# Patient Record
Sex: MALE | Race: WHITE | NOT HISPANIC OR LATINO | Employment: OTHER | URBAN - METROPOLITAN AREA
[De-identification: names, ages, dates, MRNs, and addresses within clinical notes are randomized per-mention and may not be internally consistent; named-entity substitution may affect disease eponyms.]

---

## 2017-01-12 ENCOUNTER — TELEPHONE (OUTPATIENT)
Dept: FAMILY MEDICINE | Facility: CLINIC | Age: 77
End: 2017-01-12

## 2017-01-12 NOTE — TELEPHONE ENCOUNTER
----- Message from RT Tyrone sent at 1/12/2017 11:04 AM CST -----  Contact: Karrie (wife) [phone]   Karrie (wife) [phone] , requesting lab test orders in and scheduled for the pt prior the his appt of 04/20/2017, please call to confirm, thanks.

## 2017-01-12 NOTE — TELEPHONE ENCOUNTER
Pt is having 6 month labs done January 13th and has 6 month appointment jan 20th   Labs include TSH, CMP,HgbA1c, Lipid.    You can schedule his annual labs at time of his visit jan 20th       Tried to call pt no answer.

## 2017-01-13 ENCOUNTER — LAB VISIT (OUTPATIENT)
Dept: LAB | Facility: HOSPITAL | Age: 77
End: 2017-01-13
Attending: FAMILY MEDICINE
Payer: MEDICARE

## 2017-01-13 DIAGNOSIS — E11.9 TYPE 2 DIABETES MELLITUS WITHOUT COMPLICATION: ICD-10-CM

## 2017-01-13 DIAGNOSIS — E78.5 HYPERLIPIDEMIA: ICD-10-CM

## 2017-01-13 LAB
ALBUMIN SERPL BCP-MCNC: 4 G/DL
ALP SERPL-CCNC: 58 U/L
ALT SERPL W/O P-5'-P-CCNC: 24 U/L
ANION GAP SERPL CALC-SCNC: 9 MMOL/L
AST SERPL-CCNC: 22 U/L
BILIRUB SERPL-MCNC: 0.6 MG/DL
BUN SERPL-MCNC: 21 MG/DL
CALCIUM SERPL-MCNC: 9.8 MG/DL
CHLORIDE SERPL-SCNC: 104 MMOL/L
CHOLEST/HDLC SERPL: 3.8 {RATIO}
CO2 SERPL-SCNC: 26 MMOL/L
CREAT SERPL-MCNC: 1.2 MG/DL
EST. GFR  (AFRICAN AMERICAN): >60 ML/MIN/1.73 M^2
EST. GFR  (NON AFRICAN AMERICAN): 58 ML/MIN/1.73 M^2
GLUCOSE SERPL-MCNC: 169 MG/DL
HDL/CHOLESTEROL RATIO: 26 %
HDLC SERPL-MCNC: 127 MG/DL
HDLC SERPL-MCNC: 33 MG/DL
LDLC SERPL CALC-MCNC: 72.2 MG/DL
NONHDLC SERPL-MCNC: 94 MG/DL
POTASSIUM SERPL-SCNC: 4.3 MMOL/L
PROT SERPL-MCNC: 7.2 G/DL
SODIUM SERPL-SCNC: 139 MMOL/L
TRIGL SERPL-MCNC: 109 MG/DL
TSH SERPL DL<=0.005 MIU/L-ACNC: 2.34 UIU/ML

## 2017-01-13 PROCEDURE — 36415 COLL VENOUS BLD VENIPUNCTURE: CPT | Mod: PO

## 2017-01-13 PROCEDURE — 80061 LIPID PANEL: CPT

## 2017-01-13 PROCEDURE — 84443 ASSAY THYROID STIM HORMONE: CPT

## 2017-01-13 PROCEDURE — 83036 HEMOGLOBIN GLYCOSYLATED A1C: CPT

## 2017-01-13 PROCEDURE — 80053 COMPREHEN METABOLIC PANEL: CPT

## 2017-01-14 LAB
ESTIMATED AVG GLUCOSE: 166 MG/DL
HBA1C MFR BLD HPLC: 7.4 %

## 2017-01-20 ENCOUNTER — OFFICE VISIT (OUTPATIENT)
Dept: FAMILY MEDICINE | Facility: CLINIC | Age: 77
End: 2017-01-20
Payer: MEDICARE

## 2017-01-20 VITALS
BODY MASS INDEX: 25.07 KG/M2 | SYSTOLIC BLOOD PRESSURE: 110 MMHG | RESPIRATION RATE: 15 BRPM | WEIGHT: 165.38 LBS | HEART RATE: 77 BPM | DIASTOLIC BLOOD PRESSURE: 72 MMHG | HEIGHT: 68 IN

## 2017-01-20 DIAGNOSIS — E78.5 HYPERLIPIDEMIA, UNSPECIFIED HYPERLIPIDEMIA TYPE: ICD-10-CM

## 2017-01-20 DIAGNOSIS — R35.0 URINARY FREQUENCY: ICD-10-CM

## 2017-01-20 DIAGNOSIS — E11.9 TYPE 2 DIABETES MELLITUS WITHOUT COMPLICATION, WITHOUT LONG-TERM CURRENT USE OF INSULIN: Primary | ICD-10-CM

## 2017-01-20 LAB
BACTERIA #/AREA URNS HPF: NORMAL /HPF
BILIRUB UR QL STRIP: NEGATIVE
CLARITY UR: CLEAR
COLOR UR: YELLOW
CREAT UR-MCNC: 215 MG/DL
GLUCOSE UR QL STRIP: NEGATIVE
HGB UR QL STRIP: ABNORMAL
KETONES UR QL STRIP: NEGATIVE
LEUKOCYTE ESTERASE UR QL STRIP: NEGATIVE
MICROALBUMIN UR DL<=1MG/L-MCNC: 28 UG/ML
MICROALBUMIN/CREATININE RATIO: 13 UG/MG
MICROSCOPIC COMMENT: NORMAL
NITRITE UR QL STRIP: NEGATIVE
PH UR STRIP: 6 [PH] (ref 5–8)
PROT UR QL STRIP: NEGATIVE
RBC #/AREA URNS HPF: 1 /HPF (ref 0–4)
SP GR UR STRIP: >=1.03 (ref 1–1.03)
SQUAMOUS #/AREA URNS HPF: 1 /HPF
URN SPEC COLLECT METH UR: ABNORMAL
WBC #/AREA URNS HPF: 1 /HPF (ref 0–5)

## 2017-01-20 PROCEDURE — 99214 OFFICE O/P EST MOD 30 MIN: CPT | Mod: S$GLB,,, | Performed by: FAMILY MEDICINE

## 2017-01-20 PROCEDURE — 1159F MED LIST DOCD IN RCRD: CPT | Mod: S$GLB,,, | Performed by: FAMILY MEDICINE

## 2017-01-20 PROCEDURE — 99999 PR PBB SHADOW E&M-EST. PATIENT-LVL III: CPT | Mod: PBBFAC,,, | Performed by: FAMILY MEDICINE

## 2017-01-20 PROCEDURE — 1160F RVW MEDS BY RX/DR IN RCRD: CPT | Mod: S$GLB,,, | Performed by: FAMILY MEDICINE

## 2017-01-20 PROCEDURE — 87086 URINE CULTURE/COLONY COUNT: CPT

## 2017-01-20 PROCEDURE — 82570 ASSAY OF URINE CREATININE: CPT

## 2017-01-20 PROCEDURE — 1157F ADVNC CARE PLAN IN RCRD: CPT | Mod: S$GLB,,, | Performed by: FAMILY MEDICINE

## 2017-01-20 PROCEDURE — 1126F AMNT PAIN NOTED NONE PRSNT: CPT | Mod: S$GLB,,, | Performed by: FAMILY MEDICINE

## 2017-01-20 PROCEDURE — 81000 URINALYSIS NONAUTO W/SCOPE: CPT | Mod: PO

## 2017-01-20 NOTE — PROGRESS NOTES
Subjective:       Patient ID: Cecilio Cespedes is a 76 y.o. male.    Chief Complaint: Diabetes (6 m onth follow up with labs ); Urinary Frequency; low appetite; and Cold Feet (mostly at night )    HPI     Type 2 diabetes, chronic condition.  Mild worsening of control with increase in his A1c to 7.4.    Urinary frequency and nocturia 2-3per night.  No dysuria  .  No fever or chills.    Hyperlipidemia chronic with persistently low HDL    Feet have been cold.  No cyanosis.  No claudication.    Active Ambulatory Problems     Diagnosis Date Noted    Hyperlipidemia 03/24/2014    Tobacco dependence 07/15/2016    Type 2 diabetes mellitus without complication 07/15/2016     Resolved Ambulatory Problems     Diagnosis Date Noted    No Resolved Ambulatory Problems     Past Medical History   Diagnosis Date    Diabetes 2001    Type 2 diabetes mellitus        Review of Systems   Constitutional: Negative for chills and fever.   Respiratory: Negative for shortness of breath and wheezing.    Cardiovascular: Negative for chest pain, palpitations and leg swelling.   Gastrointestinal: Negative for abdominal distention and abdominal pain.   Endocrine: Negative for polydipsia, polyphagia and polyuria.   Genitourinary: Positive for frequency. Negative for decreased urine volume, dysuria, flank pain, hematuria and urgency.       Objective:      Physical Exam   Constitutional: He is oriented to person, place, and time. He appears well-developed and well-nourished. No distress.   HENT:   Head: Normocephalic and atraumatic.   Eyes: No scleral icterus.   Neck: Normal range of motion. Neck supple.   Cardiovascular: Normal rate, regular rhythm and normal heart sounds.  Exam reveals no gallop.    No murmur heard.  Pulses:       Dorsalis pedis pulses are 1+ on the right side, and 1+ on the left side.        Posterior tibial pulses are 1+ on the right side, and 1+ on the left side.   Pulmonary/Chest: Effort normal. No respiratory distress.    Musculoskeletal:        Right foot: There is normal range of motion and no deformity.        Left foot: There is no deformity.   Feet:   Right Foot:   Protective Sensation: 10 sites tested. 10 sites sensed.   Skin Integrity: Negative for ulcer or skin breakdown.   Left Foot:   Protective Sensation: 10 sites tested. 10 sites sensed.   Skin Integrity: Negative for ulcer or skin breakdown.   Neurological: He is alert and oriented to person, place, and time. He has normal reflexes.   Skin: Skin is warm and dry. He is not diaphoretic.   Psychiatric: He has a normal mood and affect. His behavior is normal.   Vitals reviewed.    Foot exam reveals normal skin palpable pulses bilaterally slightly below normal but no cyanosis.  Assessment:       1. Type 2 diabetes mellitus without complication, without long-term current use of insulin    2. Hyperlipidemia, unspecified hyperlipidemia type    3. Urinary frequency        Plan:       Cecilio COUGHLIN was seen today for diabetes, urinary frequency, low appetite and cold feet.    Diagnoses and all orders for this visit:    Type 2 diabetes mellitus without complication, without long-term current use of insulin  -     Hemoglobin A1c; Future  -     Comprehensive metabolic panel; Future  -     Microalbumin/creatinine urine ratio; Future  Aerobic exercise 3-4 times a week along with continuing an active lifestyle  Reduce carbohydrate intakeby skin and 15%per meal    Hyperlipidemia, unspecified hyperlipidemia type  -     Lipid panel; Future  Regular aerobic exercise should help HDL    Urinary frequency  -     Urinalysis; Future  -     Urine culture; Future  -     Urinalysis Microscopic; Future  If urinalysis is normal and frequency persist he will contact us to consider urology consultation.

## 2017-01-20 NOTE — MR AVS SNAPSHOT
[facility]  [address]  [facility] [address]  Phone: [phone]  Fax: [phone]                  Cecilio Cespedes   2017 8:20 AM   Office Visit    Description:  Male : 1940   Provider:  Mason West MD   Department:  [facility]           Reason for Visit     Diabetes     Urinary Frequency     low appetite     Cold Feet           Diagnoses this Visit        Comments    Type 2 diabetes mellitus without complication, without long-term current use of insulin    -  Primary     Hyperlipidemia, unspecified hyperlipidemia type         Urinary frequency                To Do List           Future Appointments        Provider Department Dept Phone    2017 8:05 AM LAB, [place] [facility] [phone]    2017 8:40 AM Mason West MD [facility] [phone]      Goals (5 Years of Data)     None      Follow-Up and Disposition     Follow-up and Disposition History      [facility]     [facility] Care [facility] - 24/ Assistance  Registered nurses in the [facility] provide clinical advisement, health education, appointment booking, and other advisory services.  Call for this free service at [phone].             Medications           Message regarding Medications     Verify the changes and/or additions to your medication regime listed below are the same as discussed with your clinician today.  If any of these changes or additions are incorrect, please notify your healthcare provider.             Verify that the below list of medications is an accurate representation of the medications you are currently taking.  If none reported, the list may be blank. If incorrect, please contact your healthcare provider. Carry this list with you in case of emergency.           Current Medications     aspirin (ECOTRIN) 81 MG EC tablet Take 1 tablet by mouth Daily.    blood sugar diagnostic  "(ACCU-CHEK CHIKIS PLUS TEST STRP) Strp Dx Code: E11.9 Test Once Daily    blood sugar diagnostic (ACCU-CHEK CHIKIS) Strp Strips and lancets to test glucose twice daily.    blood sugar diagnostic Strp Use test strips and lancets everyday.     metformin (GLUCOPHAGE) 500 MG tablet Take 1 po in am and 2 po in evening    simvastatin (ZOCOR) 40 MG tablet Take 1 tablet (40 mg total) by mouth every evening.    adhesive bandage 1 X 3 " [facility]            Clinical Reference Information           Vital Signs - Last Recorded  Most recent update: 1/20/2017  8:18 AM by Bambi Bryant LPN    BP Pulse Resp Ht Wt BMI    110/72 77 15 5' 8" (1.727 m) 75 kg (165 lb 5.5 oz) 25.14 kg/m2      Blood Pressure          Most Recent Value    BP  110/72      Allergies as of 1/20/2017     No Known Allergies      Immunizations Administered on Date of Encounter - 1/20/2017     None      Orders Placed During Today's Visit      Normal Orders This Visit    Microalbumin/creatinine urine ratio     Urinalysis Microscopic     Urinalysis     Urine culture     Future Labs/Procedures Expected by Expires    Comprehensive metabolic panel  1/20/2017 1/21/2018    Hemoglobin A1c  1/20/2017 1/21/2018    Lipid panel  1/20/2017 1/21/2018    Microalbumin/creatinine urine ratio  1/20/2017 1/21/2018    Urinalysis  1/20/2017 4/21/2017    Urine culture  1/20/2017 (Approximate) 7/19/2017    Urinalysis Microscopic  As directed 1/20/2018      "

## 2017-01-22 LAB — BACTERIA UR CULT: NO GROWTH

## 2017-02-20 RX ORDER — METFORMIN HYDROCHLORIDE 500 MG/1
TABLET ORAL
Qty: 360 TABLET | Refills: 3 | Status: SHIPPED | OUTPATIENT
Start: 2017-02-20 | End: 2018-01-30 | Stop reason: SDUPTHER

## 2017-02-20 RX ORDER — SIMVASTATIN 40 MG/1
40 TABLET, FILM COATED ORAL NIGHTLY
Qty: 90 TABLET | Refills: 3 | Status: SHIPPED | OUTPATIENT
Start: 2017-02-20 | End: 2018-01-30

## 2017-02-20 NOTE — TELEPHONE ENCOUNTER
----- Message from Jose Hannah sent at 2/20/2017 10:12 AM CST -----  Patient needs a refill on Metformin 500 mg, Simvastatin 40 mg called into [facility] pharmacy.  Please call patient at [phone] if you have any questions. Thanks!

## 2017-03-07 ENCOUNTER — CLINICAL SUPPORT (OUTPATIENT)
Dept: SMOKING CESSATION | Facility: CLINIC | Age: 77
End: 2017-03-07
Payer: COMMERCIAL

## 2017-03-07 DIAGNOSIS — F17.200 NICOTINE DEPENDENCE: Primary | ICD-10-CM

## 2017-03-07 PROCEDURE — 99407 BEHAV CHNG SMOKING > 10 MIN: CPT | Mod: S$GLB,,,

## 2017-04-13 ENCOUNTER — LAB VISIT (OUTPATIENT)
Dept: LAB | Facility: HOSPITAL | Age: 77
End: 2017-04-13
Attending: FAMILY MEDICINE
Payer: MEDICARE

## 2017-04-13 DIAGNOSIS — E11.9 TYPE 2 DIABETES MELLITUS WITHOUT COMPLICATION, WITHOUT LONG-TERM CURRENT USE OF INSULIN: ICD-10-CM

## 2017-04-13 DIAGNOSIS — E78.5 HYPERLIPIDEMIA, UNSPECIFIED HYPERLIPIDEMIA TYPE: ICD-10-CM

## 2017-04-13 LAB
ALBUMIN SERPL BCP-MCNC: 3.7 G/DL
ALP SERPL-CCNC: 57 U/L
ALT SERPL W/O P-5'-P-CCNC: 18 U/L
ANION GAP SERPL CALC-SCNC: 8 MMOL/L
AST SERPL-CCNC: 19 U/L
BILIRUB SERPL-MCNC: 0.6 MG/DL
BUN SERPL-MCNC: 18 MG/DL
CALCIUM SERPL-MCNC: 9.3 MG/DL
CHLORIDE SERPL-SCNC: 106 MMOL/L
CHOLEST/HDLC SERPL: 3 {RATIO}
CO2 SERPL-SCNC: 26 MMOL/L
CREAT SERPL-MCNC: 1.2 MG/DL
EST. GFR  (AFRICAN AMERICAN): >60 ML/MIN/1.73 M^2
EST. GFR  (NON AFRICAN AMERICAN): 57.6 ML/MIN/1.73 M^2
GLUCOSE SERPL-MCNC: 179 MG/DL
HDL/CHOLESTEROL RATIO: 33.3 %
HDLC SERPL-MCNC: 120 MG/DL
HDLC SERPL-MCNC: 40 MG/DL
LDLC SERPL CALC-MCNC: 64.2 MG/DL
NONHDLC SERPL-MCNC: 80 MG/DL
POTASSIUM SERPL-SCNC: 4.6 MMOL/L
PROT SERPL-MCNC: 6.7 G/DL
SODIUM SERPL-SCNC: 140 MMOL/L
TRIGL SERPL-MCNC: 79 MG/DL

## 2017-04-13 PROCEDURE — 80053 COMPREHEN METABOLIC PANEL: CPT

## 2017-04-13 PROCEDURE — 80061 LIPID PANEL: CPT

## 2017-04-13 PROCEDURE — 36415 COLL VENOUS BLD VENIPUNCTURE: CPT | Mod: PO

## 2017-04-13 PROCEDURE — 83036 HEMOGLOBIN GLYCOSYLATED A1C: CPT

## 2017-04-15 LAB
ESTIMATED AVG GLUCOSE: 169 MG/DL
HBA1C MFR BLD HPLC: 7.5 %

## 2017-04-20 ENCOUNTER — OFFICE VISIT (OUTPATIENT)
Dept: FAMILY MEDICINE | Facility: CLINIC | Age: 77
End: 2017-04-20
Payer: MEDICARE

## 2017-04-20 VITALS
RESPIRATION RATE: 16 BRPM | DIASTOLIC BLOOD PRESSURE: 62 MMHG | WEIGHT: 166.69 LBS | HEIGHT: 68 IN | BODY MASS INDEX: 25.26 KG/M2 | SYSTOLIC BLOOD PRESSURE: 114 MMHG | HEART RATE: 75 BPM

## 2017-04-20 DIAGNOSIS — E11.9 TYPE 2 DIABETES MELLITUS WITHOUT COMPLICATION, WITHOUT LONG-TERM CURRENT USE OF INSULIN: Primary | ICD-10-CM

## 2017-04-20 DIAGNOSIS — E78.5 HYPERLIPIDEMIA, UNSPECIFIED HYPERLIPIDEMIA TYPE: ICD-10-CM

## 2017-04-20 PROCEDURE — 99214 OFFICE O/P EST MOD 30 MIN: CPT | Mod: S$GLB,,, | Performed by: FAMILY MEDICINE

## 2017-04-20 PROCEDURE — 99999 PR PBB SHADOW E&M-EST. PATIENT-LVL III: CPT | Mod: PBBFAC,,, | Performed by: FAMILY MEDICINE

## 2017-04-20 RX ORDER — GLIMEPIRIDE 1 MG/1
1 TABLET ORAL
Qty: 90 TABLET | Refills: 3 | Status: SHIPPED | OUTPATIENT
Start: 2017-04-20 | End: 2017-04-25 | Stop reason: SDUPTHER

## 2017-04-20 NOTE — PROGRESS NOTES
"Subjective:     THIS DOCUMENT WAS MADE IN PART WITH [employer] DICTATION SOFTWARE.  OCCASIONALLY THIS SOFTWARE WILL MISINTERPRET WORDS OR PHRASES.     Patient ID: Cecilio Cespedes is a 77 y.o. male.    Chief Complaint: Diabetes (follow with labs )    HPI     T2DM, chronic condition with worsening control, A1c now 7.5.  He is up-to-date with his eye and foot exams.  He does take the metformin as prescribed.  Has not been following his diet as closely as he should.    Hyperlipidemia this has improved,    Active Ambulatory Problems     Diagnosis Date Noted    Hyperlipidemia 03/24/2014    Type 2 diabetes mellitus without complication 07/15/2016     Resolved Ambulatory Problems     Diagnosis Date Noted    Tobacco dependence 07/15/2016     Past Medical History:   Diagnosis Date    Diabetes 2001    Hyperlipidemia     Type 2 diabetes mellitus        Review of Systems   Constitutional: Negative for chills and fever.   Eyes: Negative for visual disturbance.   Respiratory: Negative for shortness of breath and wheezing.    Cardiovascular: Negative for chest pain, palpitations and leg swelling.   Gastrointestinal: Negative for abdominal distention and abdominal pain.   Endocrine: Negative for polydipsia, polyphagia and polyuria.   Genitourinary: Negative for decreased urine volume, dysuria, flank pain, hematuria and urgency.       Objective:       Vitals:    04/20/17 0847   BP: 114/62   Pulse: 75   Resp: 16   Weight: 75.6 kg (166 lb 10.7 oz)   Height: 5' 8" (1.727 m)       Physical Exam   Constitutional: He is oriented to person, place, and time. He appears well-developed and well-nourished. No distress.   HENT:   Head: Normocephalic and atraumatic.   Right Ear: External ear normal.   Left Ear: External ear normal.   Mouth/Throat: No oropharyngeal exudate.   Eyes: EOM are normal. Pupils are equal, round, and reactive to light. Right eye exhibits no discharge. Left eye exhibits no discharge. No scleral icterus.   Neck: Normal " range of motion. Neck supple. No thyromegaly present.   Cardiovascular: Normal rate and regular rhythm.  Exam reveals gallop.    No murmur heard.  Pulmonary/Chest: Effort normal and breath sounds normal. No respiratory distress. He has no wheezes.   Neurological: He is alert and oriented to person, place, and time.   Skin: Skin is warm and dry. He is not diaphoretic.   Psychiatric: He has a normal mood and affect. His behavior is normal.   Vitals reviewed.      Results for orders placed or performed in visit on 04/13/17   Hemoglobin A1c   Result Value Ref Range    Hemoglobin A1C 7.5 (H) 4.5 - 6.2 %    Estimated Avg Glucose 169 (H) 68 - 131 mg/dL   Lipid panel   Result Value Ref Range    Cholesterol 120 120 - 199 mg/dL    Triglycerides 79 30 - 150 mg/dL    HDL 40 40 - 75 mg/dL    LDL Cholesterol 64.2 63.0 - 159.0 mg/dL    HDL/Chol Ratio 33.3 20.0 - 50.0 %    Total Cholesterol/HDL Ratio 3.0 2.0 - 5.0    Non-HDL Cholesterol 80 mg/dL   Comprehensive metabolic panel   Result Value Ref Range    Sodium 140 136 - 145 mmol/L    Potassium 4.6 3.5 - 5.1 mmol/L    Chloride 106 95 - 110 mmol/L    CO2 26 23 - 29 mmol/L    Glucose 179 (H) 70 - 110 mg/dL    BUN, Bld 18 8 - 23 mg/dL    Creatinine 1.2 0.5 - 1.4 mg/dL    Calcium 9.3 8.7 - 10.5 mg/dL    Total Protein 6.7 6.0 - 8.4 g/dL    Albumin 3.7 3.5 - 5.2 g/dL    Total Bilirubin 0.6 0.1 - 1.0 mg/dL    Alkaline Phosphatase 57 55 - 135 U/L    AST 19 10 - 40 U/L    ALT 18 10 - 44 U/L    Anion Gap 8 8 - 16 mmol/L    eGFR if African American >60.0 >60 mL/min/1.73 m^2    eGFR if non  57.6 (A) >60 mL/min/1.73 m^2       Assessment:       1. Type 2 diabetes mellitus without complication, without long-term current use of insulin    2. Hyperlipidemia, unspecified hyperlipidemia type        Plan:       Cecilio COUGHLIN was seen today for diabetes.    Diagnoses and all orders for this visit:    Type 2 diabetes mellitus without complication, without long-term current use of insulin  -      Hemoglobin A1c; Future  -     Basic metabolic panel; Future  Worsening of control, of course she must work on diet and exercise we did discuss this.  But begin glimepiride 1 mg daily.  This is a low-dose but he was still counseled on hypoglycemia risk.  He should not skip meals and will let me know if he has any problems.  Labs and follow back in 3 months    Hyperlipidemia, unspecified hyperlipidemia type improved continue current medication     Other orders  -     glimepiride (AMARYL) 1 MG tablet; Take 1 tablet (1 mg total) by mouth before breakfast.

## 2017-04-20 NOTE — MR AVS SNAPSHOT
[facility]  [address]  [facility] [address]  Phone: [phone]  Fax: [phone]                  Cecilio Cespedes   2017 8:40 AM   Office Visit    Description:  Male : 1940   Provider:  Mason West MD   Department:  [facility]           Reason for Visit     Diabetes           Diagnoses this Visit        Comments    Type 2 diabetes mellitus without complication, without long-term current use of insulin    -  Primary     Hyperlipidemia, unspecified hyperlipidemia type                To Do List           Future Appointments        Provider Department Dept Phone    2017 8:00 AM LAB, [place] [facility] [phone]    2017 8:40 AM Mason West MD [facility] [phone]      Goals (5 Years of Data)     None       These Medications        Disp Refills Start End    glimepiride (AMARYL) 1 MG tablet 90 tablet 3 2017    Take 1 tablet (1 mg total) by mouth before breakfast. - Oral    Pharmacy: [facility] - [address] Ph #: [phone]         [facility]     [facility] Care [facility] -  Assistance  Unless otherwise directed by your provider, please contact [facility], our nurse care line that is available for  assistance.     Registered nurses in the [facility] provide: appointment scheduling, clinical advisement, health education, and other advisory services.  Call: [phone] (toll free)               Medications           Message regarding Medications     Verify the changes and/or additions to your medication regime listed below are the same as discussed with your clinician today.  If any of these changes or additions are incorrect, please notify your healthcare provider.        START taking these NEW medications        Refills    glimepiride (AMARYL) 1 MG tablet 3    Sig: Take 1 tablet  "(1 mg total) by mouth before breakfast.    Class: Normal    Route: Oral           Verify that the below list of medications is an accurate representation of the medications you are currently taking.  If none reported, the list may be blank. If incorrect, please contact your healthcare provider. Carry this list with you in case of emergency.           Current Medications     adhesive bandage 1 X 3 " [facility]     aspirin (ECOTRIN) 81 MG EC tablet Take 1 tablet by mouth Daily.    blood sugar diagnostic (ACCU-CHEK CHIKIS PLUS TEST STRP) Strp Dx Code: E11.9 Test Once Daily    blood sugar diagnostic (ACCU-CHEK CHIKIS) Strp Strips and lancets to test glucose twice daily.    blood sugar diagnostic Strp Use test strips and lancets everyday.     metformin (GLUCOPHAGE) 500 MG tablet Take 2 po in am and 2 po in evening    simvastatin (ZOCOR) 40 MG tablet Take 1 tablet (40 mg total) by mouth every evening.    glimepiride (AMARYL) 1 MG tablet Take 1 tablet (1 mg total) by mouth before breakfast.           Clinical Reference Information           Your Vitals Were     BP Pulse Resp Height Weight BMI    114/62 75 16 5' 8" (1.727 m) 75.6 kg (166 lb 10.7 oz) 25.34 kg/m2      Blood Pressure          Most Recent Value    BP  114/62      Allergies as of 4/20/2017     No Known Allergies      Immunizations Administered on Date of Encounter - 4/20/2017     None      Orders Placed During Today's Visit     Future Labs/Procedures Expected by Expires    Basic metabolic panel  7/26/2017 4/21/2018    Hemoglobin A1c  7/26/2017 4/21/2018      Language Assistance Services     ATTENTION: Language assistance services are available, free of charge. Please call [phone].      ATENCIÓN: Si habla español, tiene a hanna disposición servicios gratuitos de asistencia lingüística. Llame al [phone].     LEWIS Ý: N?u b?n nói Ti?ng Vi?t, có các d?ch v? h? tr? ngôn ng? mi?n phí dành cho b?n. G?i s? [phone].         [facility] complies " with applicable Federal civil rights laws and does not discriminate on the basis of race, color, national origin, age, disability, or sex.

## 2017-04-25 RX ORDER — GLIMEPIRIDE 1 MG/1
1 TABLET ORAL
Qty: 90 TABLET | Refills: 3 | Status: SHIPPED | OUTPATIENT
Start: 2017-04-25 | End: 2018-01-30 | Stop reason: SDUPTHER

## 2017-04-25 NOTE — TELEPHONE ENCOUNTER
----- Message from Odessa Blake sent at 4/25/2017 10:35 AM CDT -----  Contact: wife,Karrie Yoon wants to speak with nurse regarding the patient's medication being sent to the wrong pharmacy.  States their no longer using [employer] mail order service. The medication needs to be sent to     [facility] - [place], [place] - [address] [place] [address]  Phone: [phone] Fax: [phone]

## 2017-04-25 NOTE — TELEPHONE ENCOUNTER
----- Message from Prashanth Franks sent at 4/25/2017 10:45 AM CDT -----  Contact: Karrie Wife  Mail ordered prescription was sent to [facility], but patient now has [employer]. The prescription. Call back [phone] (home)     [facility] - [place], [place] - [address] [place] [address]  Phone: [phone] Fax: [phone]    Nicomanoj needs to be removed

## 2017-05-02 DIAGNOSIS — E11.9 TYPE 2 DIABETES MELLITUS WITHOUT COMPLICATION, WITHOUT LONG-TERM CURRENT USE OF INSULIN: Primary | ICD-10-CM

## 2017-05-02 NOTE — TELEPHONE ENCOUNTER
Is he really testing 3 times a day?.  If he is then when he comes in he needs to bring that list to verify and document that he is actually checking it 3 times a day    You see if I write and a prescription for 3 times a day and he is only checking it once or twice yet getting the insurance company to pay for extra then that is technically problematic.    I can only authorizing as much as he is actually using and he has to bring in his actual list to verify every time I see him.

## 2017-05-02 NOTE — TELEPHONE ENCOUNTER
----- Message from Karly Crews sent at 5/2/2017 12:07 PM CDT -----  Requesting refill for Sandhya plus accue check test strips / testing 3 x d ... call [phone]    [id number] - [place] - [address]  [address]  [place] [address]  Phone: [phone] Fax: [phone]

## 2017-05-02 NOTE — TELEPHONE ENCOUNTER
Spoke with wife he is not testing three times a day.   He test once a day per wife. Explained that insurance will only cover three times a day if he is on insulin and sliding scale she understands.

## 2017-07-20 ENCOUNTER — LAB VISIT (OUTPATIENT)
Dept: LAB | Facility: HOSPITAL | Age: 77
End: 2017-07-20
Attending: FAMILY MEDICINE
Payer: MEDICARE

## 2017-07-20 DIAGNOSIS — E11.9 TYPE 2 DIABETES MELLITUS WITHOUT COMPLICATION, WITHOUT LONG-TERM CURRENT USE OF INSULIN: ICD-10-CM

## 2017-07-20 LAB
ANION GAP SERPL CALC-SCNC: 6 MMOL/L
BUN SERPL-MCNC: 21 MG/DL
CALCIUM SERPL-MCNC: 9.3 MG/DL
CHLORIDE SERPL-SCNC: 107 MMOL/L
CO2 SERPL-SCNC: 27 MMOL/L
CREAT SERPL-MCNC: 1.2 MG/DL
EST. GFR  (AFRICAN AMERICAN): >60 ML/MIN/1.73 M^2
EST. GFR  (NON AFRICAN AMERICAN): 57.6 ML/MIN/1.73 M^2
ESTIMATED AVG GLUCOSE: 131 MG/DL
GLUCOSE SERPL-MCNC: 119 MG/DL
HBA1C MFR BLD HPLC: 6.2 %
POTASSIUM SERPL-SCNC: 4.5 MMOL/L
SODIUM SERPL-SCNC: 140 MMOL/L

## 2017-07-20 PROCEDURE — 80048 BASIC METABOLIC PNL TOTAL CA: CPT

## 2017-07-20 PROCEDURE — 36415 COLL VENOUS BLD VENIPUNCTURE: CPT | Mod: PO

## 2017-07-20 PROCEDURE — 83036 HEMOGLOBIN GLYCOSYLATED A1C: CPT

## 2017-07-27 ENCOUNTER — OFFICE VISIT (OUTPATIENT)
Dept: FAMILY MEDICINE | Facility: CLINIC | Age: 77
End: 2017-07-27
Payer: MEDICARE

## 2017-07-27 VITALS
BODY MASS INDEX: 24.89 KG/M2 | HEIGHT: 68 IN | HEART RATE: 74 BPM | WEIGHT: 164.25 LBS | DIASTOLIC BLOOD PRESSURE: 62 MMHG | RESPIRATION RATE: 16 BRPM | SYSTOLIC BLOOD PRESSURE: 116 MMHG

## 2017-07-27 DIAGNOSIS — E78.5 HYPERLIPIDEMIA, UNSPECIFIED HYPERLIPIDEMIA TYPE: ICD-10-CM

## 2017-07-27 DIAGNOSIS — R25.2 LEG CRAMPS: ICD-10-CM

## 2017-07-27 DIAGNOSIS — E11.9 TYPE 2 DIABETES MELLITUS WITHOUT COMPLICATION, WITHOUT LONG-TERM CURRENT USE OF INSULIN: Primary | ICD-10-CM

## 2017-07-27 PROCEDURE — 99499 UNLISTED E&M SERVICE: CPT | Mod: S$GLB,,, | Performed by: FAMILY MEDICINE

## 2017-07-27 PROCEDURE — 99999 PR PBB SHADOW E&M-EST. PATIENT-LVL III: CPT | Mod: PBBFAC,,, | Performed by: FAMILY MEDICINE

## 2017-07-27 PROCEDURE — 99214 OFFICE O/P EST MOD 30 MIN: CPT | Mod: S$GLB,,, | Performed by: FAMILY MEDICINE

## 2017-07-27 PROCEDURE — 1159F MED LIST DOCD IN RCRD: CPT | Mod: S$GLB,,, | Performed by: FAMILY MEDICINE

## 2017-07-27 PROCEDURE — 1126F AMNT PAIN NOTED NONE PRSNT: CPT | Mod: S$GLB,,, | Performed by: FAMILY MEDICINE

## 2017-07-27 RX ORDER — LANCETS 30 GAUGE
EACH MISCELLANEOUS
Refills: 0 | COMMUNITY
Start: 2017-05-03 | End: 2019-10-29

## 2017-07-27 NOTE — PROGRESS NOTES
"Subjective:     THIS DOCUMENT WAS MADE IN PART WITH [employer] DICTATION SOFTWARE.  OCCASIONALLY THIS SOFTWARE WILL MISINTERPRET WORDS OR PHRASES.     Patient ID: Cecilio Cespedes is a 77 y.o. male.    Chief Complaint: Diabetes (follow up with labs ; HM pneumonia vacccine )    HPI     Follow-up type 2 diabetes.  Chronic condition, improved A1c 6.2, down from 7.5 and 3 months ago.    Hyperlipidemia, this was checked last time but it was better as well HDL up to 40 LDL was 64 triglyceride 79    Leg cramps, new last 6 months, no claudication, no cyanosis  About twice a week.    Active Ambulatory Problems     Diagnosis Date Noted    Hyperlipidemia 03/24/2014    Type 2 diabetes mellitus without complication 07/15/2016     Resolved Ambulatory Problems     Diagnosis Date Noted    Tobacco dependence 07/15/2016     Past Medical History:   Diagnosis Date    Diabetes 2001    Hyperlipidemia     Type 2 diabetes mellitus        Review of Systems   Constitutional: Positive for fatigue.   Cardiovascular: Negative for chest pain.   Endocrine: Positive for polyuria. Negative for polydipsia and polyphagia.   Skin: Negative for pallor.   Neurological: Negative for dizziness, tremors, seizures, speech difficulty, weakness and headaches.   Psychiatric/Behavioral: Negative for confusion. The patient is not nervous/anxious.        Objective:       Vitals:    07/27/17 0851   BP: 116/62   BP Location: Left arm   Patient Position: Sitting   BP Method: Manual   Pulse: 74   Resp: 16   Weight: 74.5 kg (164 lb 3.9 oz)   Height: 5' 8" (1.727 m)       Physical Exam   Constitutional: He is oriented to person, place, and time. He appears well-developed and well-nourished. No distress.   HENT:   Head: Normocephalic and atraumatic.   Right Ear: External ear normal.   Left Ear: External ear normal.   Mouth/Throat: No oropharyngeal exudate.   Eyes: EOM are normal. Pupils are equal, round, and reactive to light. Right eye exhibits no discharge. Left " eye exhibits no discharge. No scleral icterus.   Neck: Normal range of motion. Neck supple. No thyromegaly present.   Cardiovascular: Normal rate and regular rhythm.  Exam reveals no gallop.    No murmur heard.  Pulmonary/Chest: Effort normal and breath sounds normal. No respiratory distress. He has no wheezes. He has no rales. He exhibits no tenderness.   Neurological: He is alert and oriented to person, place, and time.   Skin: Skin is warm and dry. He is not diaphoretic.   Psychiatric: He has a normal mood and affect. His behavior is normal.   Vitals reviewed.      Results for orders placed or performed in visit on 07/20/17   Hemoglobin A1c   Result Value Ref Range    Hemoglobin A1C 6.2 (H) 4.0 - 5.6 %    Estimated Avg Glucose 131 68 - 131 mg/dL   Basic metabolic panel   Result Value Ref Range    Sodium 140 136 - 145 mmol/L    Potassium 4.5 3.5 - 5.1 mmol/L    Chloride 107 95 - 110 mmol/L    CO2 27 23 - 29 mmol/L    Glucose 119 (H) 70 - 110 mg/dL    BUN, Bld 21 8 - 23 mg/dL    Creatinine 1.2 0.5 - 1.4 mg/dL    Calcium 9.3 8.7 - 10.5 mg/dL    Anion Gap 6 (L) 8 - 16 mmol/L    eGFR if African American >60.0 >60 mL/min/1.73 m^2    eGFR if non  57.6 (A) >60 mL/min/1.73 m^2       Assessment:       1. Type 2 diabetes mellitus without complication, without long-term current use of insulin    2. Hyperlipidemia, unspecified hyperlipidemia type    3. Leg cramps        Plan:       Cecilio COUGHLIN was seen today for diabetes.    Diagnoses and all orders for this visit:    Type 2 diabetes mellitus without complication, without long-term current use of insulin  -     Microalbumin/creatinine urine ratio; Future  -     Comprehensive metabolic panel; Future  -     Hemoglobin A1c; Future  -     Lipid panel; Future  Improved, continue current diabetic medications  Hyperlipidemia, unspecified hyperlipidemia type  This has been stable  Leg cramps  New complaint.  He has mildly diminished pulses on his exam but no cyanosis and  no claudication.  I recommendation is a regular stretching exercise program about an hour before bed.  Drink plenty of water.  May consider compression before bed if needed.  If not improving then discuss other options    Hold simvastatin x 2 weeks, see if fatigue is better

## 2017-07-28 ENCOUNTER — TELEPHONE (OUTPATIENT)
Dept: FAMILY MEDICINE | Facility: CLINIC | Age: 77
End: 2017-07-28

## 2017-07-28 NOTE — TELEPHONE ENCOUNTER
----- Message from Karly Crews sent at 7/28/2017 10:23 AM CDT -----  Call Miss Karrie Napierichaux ...[phone] saw  Yesterday ... Feels there is an error in paperwork given after appointment

## 2017-07-28 NOTE — TELEPHONE ENCOUNTER
Informed pt's wife that pt is to hold simvastatin x2wks and calls us to update on fatigue(better/same/worse). Understanding verbalized.

## 2017-09-06 ENCOUNTER — TELEPHONE (OUTPATIENT)
Dept: SMOKING CESSATION | Facility: CLINIC | Age: 77
End: 2017-09-06

## 2017-09-11 ENCOUNTER — CLINICAL SUPPORT (OUTPATIENT)
Dept: SMOKING CESSATION | Facility: CLINIC | Age: 77
End: 2017-09-11
Payer: COMMERCIAL

## 2017-09-11 DIAGNOSIS — F17.200 NICOTINE DEPENDENCE: Primary | ICD-10-CM

## 2017-09-11 PROCEDURE — 99407 BEHAV CHNG SMOKING > 10 MIN: CPT | Mod: S$GLB,,, | Performed by: INTERNAL MEDICINE

## 2017-10-19 ENCOUNTER — IMMUNIZATION (OUTPATIENT)
Dept: FAMILY MEDICINE | Facility: CLINIC | Age: 77
End: 2017-10-19
Payer: MEDICARE

## 2017-10-19 PROCEDURE — 90662 IIV NO PRSV INCREASED AG IM: CPT | Mod: S$GLB,,, | Performed by: INTERNAL MEDICINE

## 2017-10-19 PROCEDURE — G0008 ADMIN INFLUENZA VIRUS VAC: HCPCS | Mod: S$GLB,,, | Performed by: INTERNAL MEDICINE

## 2017-12-13 RX ORDER — BLOOD SUGAR DIAGNOSTIC
STRIP MISCELLANEOUS
Qty: 200 STRIP | Refills: 3 | Status: SHIPPED | OUTPATIENT
Start: 2017-12-13 | End: 2019-10-29

## 2017-12-13 NOTE — PROGRESS NOTES
Refill Authorization Note     is requesting a refill authorization.    Brief assessment and rationale for refill: APPROVE: prr  Amount/Quantity of medication ordered: 90d        Refills Authorized: Yes  If authorized number of refills: 3           Medication Therapy Plan: a1c controlled; approve 12 mo  Name and strength of medication: ONE TOUCH ULTRA TEST STRIPS  How patient will take medication: test BID  Medication reconciliation completed: No  Comments:   Lab Results   Component Value Date    HGBA1C 6.2 (H) 07/20/2017

## 2018-01-05 DIAGNOSIS — Z13.5 DIABETIC RETINOPATHY SCREENING: ICD-10-CM

## 2018-01-16 ENCOUNTER — TELEPHONE (OUTPATIENT)
Dept: ADMINISTRATIVE | Facility: HOSPITAL | Age: 78
End: 2018-01-16

## 2018-01-16 ENCOUNTER — PATIENT OUTREACH (OUTPATIENT)
Dept: ADMINISTRATIVE | Facility: HOSPITAL | Age: 78
End: 2018-01-16

## 2018-01-16 NOTE — LETTER
January 16, 2018    Dr. Traci Solo             [facility]  [address]  [facility] [address]  Phone: [phone] January 16, 2018     Patient: Cecilio Cespedes    YOB: 1940   Date of Visit: 1/16/2018       To Whom It May Concern:    We are seeing Cecilio Cespedes in the clinic at [facility].  Mason West MD is their PCP.  He has an outstanding lab/procedure at the time we reviewed their chart.  To help with our Health Maintenance records will you please supply the following report(s):     []  Mammogram                                                []  Colonoscopy   []  Pap Smear                                                  []  Outside Lab Results   []  Dexa scans                                                      [x]  Eye Exam (done in the past year, any Dx of retinopathy?)   []  Foot Exam                                                     [] Other___________   []  Outside Immunizations                             Please Fax to [facility] at .    Thank you for your help.  If my Care Coordinator can be of any assistance, you can call SHOAIB Weiner at [phone].     If you have any questions or concerns, please don't hesitate to call.    Sincerely,        Mason West MD

## 2018-01-16 NOTE — PROGRESS NOTES
Pre-visit Health Maintenance Review    Patient due for diabetic eye exam.  Called to schedule retinal camera appointment in [place] if not already done outside of [facility].    Upcoming Primary Care visit: Brett 1/30/18 8:40

## 2018-01-23 ENCOUNTER — LAB VISIT (OUTPATIENT)
Dept: LAB | Facility: HOSPITAL | Age: 78
End: 2018-01-23
Attending: FAMILY MEDICINE
Payer: MEDICARE

## 2018-01-23 DIAGNOSIS — E11.9 TYPE 2 DIABETES MELLITUS WITHOUT COMPLICATION, WITHOUT LONG-TERM CURRENT USE OF INSULIN: ICD-10-CM

## 2018-01-23 LAB
ALBUMIN SERPL BCP-MCNC: 3.7 G/DL
ALP SERPL-CCNC: 60 U/L
ALT SERPL W/O P-5'-P-CCNC: 18 U/L
ANION GAP SERPL CALC-SCNC: 6 MMOL/L
AST SERPL-CCNC: 16 U/L
BILIRUB SERPL-MCNC: 0.5 MG/DL
BUN SERPL-MCNC: 19 MG/DL
CALCIUM SERPL-MCNC: 9.2 MG/DL
CHLORIDE SERPL-SCNC: 107 MMOL/L
CHOLEST SERPL-MCNC: 177 MG/DL
CHOLEST/HDLC SERPL: 4.4 {RATIO}
CO2 SERPL-SCNC: 28 MMOL/L
CREAT SERPL-MCNC: 1.2 MG/DL
EST. GFR  (AFRICAN AMERICAN): >60 ML/MIN/1.73 M^2
EST. GFR  (NON AFRICAN AMERICAN): 57.6 ML/MIN/1.73 M^2
ESTIMATED AVG GLUCOSE: 117 MG/DL
GLUCOSE SERPL-MCNC: 133 MG/DL
HBA1C MFR BLD HPLC: 5.7 %
HDLC SERPL-MCNC: 40 MG/DL
HDLC SERPL: 22.6 %
LDLC SERPL CALC-MCNC: 121.4 MG/DL
NONHDLC SERPL-MCNC: 137 MG/DL
POTASSIUM SERPL-SCNC: 4.2 MMOL/L
PROT SERPL-MCNC: 6.9 G/DL
SODIUM SERPL-SCNC: 141 MMOL/L
TRIGL SERPL-MCNC: 78 MG/DL

## 2018-01-23 PROCEDURE — 80053 COMPREHEN METABOLIC PANEL: CPT

## 2018-01-23 PROCEDURE — 83036 HEMOGLOBIN GLYCOSYLATED A1C: CPT

## 2018-01-23 PROCEDURE — 36415 COLL VENOUS BLD VENIPUNCTURE: CPT | Mod: PO

## 2018-01-23 PROCEDURE — 80061 LIPID PANEL: CPT

## 2018-01-30 ENCOUNTER — OFFICE VISIT (OUTPATIENT)
Dept: FAMILY MEDICINE | Facility: CLINIC | Age: 78
End: 2018-01-30
Payer: MEDICARE

## 2018-01-30 VITALS
HEIGHT: 68 IN | HEART RATE: 74 BPM | SYSTOLIC BLOOD PRESSURE: 118 MMHG | TEMPERATURE: 98 F | BODY MASS INDEX: 26.51 KG/M2 | WEIGHT: 174.94 LBS | OXYGEN SATURATION: 98 % | DIASTOLIC BLOOD PRESSURE: 70 MMHG

## 2018-01-30 DIAGNOSIS — E78.5 DYSLIPIDEMIA: ICD-10-CM

## 2018-01-30 DIAGNOSIS — E11.9 TYPE 2 DIABETES MELLITUS WITHOUT COMPLICATION, WITHOUT LONG-TERM CURRENT USE OF INSULIN: Primary | ICD-10-CM

## 2018-01-30 DIAGNOSIS — E11.42 DIABETIC POLYNEUROPATHY ASSOCIATED WITH TYPE 2 DIABETES MELLITUS: ICD-10-CM

## 2018-01-30 PROCEDURE — 99999 PR PBB SHADOW E&M-EST. PATIENT-LVL III: CPT | Mod: PBBFAC,,, | Performed by: FAMILY MEDICINE

## 2018-01-30 PROCEDURE — 99214 OFFICE O/P EST MOD 30 MIN: CPT | Mod: S$GLB,,, | Performed by: FAMILY MEDICINE

## 2018-01-30 RX ORDER — PRAVASTATIN SODIUM 20 MG/1
20 TABLET ORAL DAILY
Qty: 90 TABLET | Refills: 3 | Status: SHIPPED | OUTPATIENT
Start: 2018-01-30 | End: 2019-03-07 | Stop reason: SDUPTHER

## 2018-01-30 RX ORDER — METFORMIN HYDROCHLORIDE 500 MG/1
TABLET ORAL
Qty: 360 TABLET | Refills: 3 | Status: SHIPPED | OUTPATIENT
Start: 2018-01-30 | End: 2019-03-19

## 2018-01-30 RX ORDER — GLIMEPIRIDE 1 MG/1
1 TABLET ORAL
Qty: 90 TABLET | Refills: 3 | Status: SHIPPED | OUTPATIENT
Start: 2018-01-30 | End: 2019-04-13 | Stop reason: SDUPTHER

## 2018-01-30 NOTE — PROGRESS NOTES
Subjective:     THIS DOCUMENT WAS MADE IN PART WITH [employer] DICTATION SOFTWARE.  OCCASIONALLY THIS SOFTWARE WILL MISINTERPRET WORDS OR PHRASES.     Patient ID: Cecilio Cespedes is a 77 y.o. male.    Chief Complaint: Follow-up (6 month follow up)    Diabetes   He presents for his follow-up diabetic visit. He has type 2 diabetes mellitus. His disease course has been improving. There are no hypoglycemic associated symptoms. Pertinent negatives for hypoglycemia include no confusion, dizziness, headaches, nervousness/anxiousness, pallor, seizures, speech difficulty or tremors. Associated symptoms include fatigue. Pertinent negatives for diabetes include no chest pain, no polydipsia, no polyphagia, no polyuria and no weakness. There are no hypoglycemic complications. Symptoms are improving. There are no diabetic complications. Risk factors for coronary artery disease include dyslipidemia. Current diabetic treatment includes oral agent (dual therapy). He is compliant with treatment all of the time. Weight trend: increasing, states coat was very heavy. He is following a generally healthy diet. Meal planning includes avoidance of concentrated sweets. He participates in exercise intermittently. His home blood glucose trend is decreasing steadily. His breakfast blood glucose range is generally 130-140 mg/dl. An ACE inhibitor/angiotensin II receptor blocker is not being taken (no microalbuminuria, normal BP). He does not see a podiatrist.Eye exam is current.     Dyslipidemia  Worse, states has been off simvastatin, side effects but does not remember what they were or if resoved    Active Ambulatory Problems     Diagnosis Date Noted    Hyperlipidemia 03/24/2014    Type 2 diabetes mellitus without complication 07/15/2016     Resolved Ambulatory Problems     Diagnosis Date Noted    Tobacco dependence 07/15/2016     Past Medical History:   Diagnosis Date    Diabetes 2001    Hyperlipidemia     Type 2 diabetes mellitus   "    Current Outpatient Prescriptions on File Prior to Visit   Medication Sig Dispense Refill    glimepiride (AMARYL) 1 MG tablet Take 1 tablet (1 mg total) by mouth before breakfast. 90 tablet 3    metformin (GLUCOPHAGE) 500 MG tablet Take 2 po in am and 2 po in evening 360 tablet 3    ONETOUCH DELICA LANCETS 33 gauge Misc USE TO TEST 1-2 TIMES A DAY  1    ONETOUCH ULTRA TEST Strp USE TO TEXT 1-2 TIMES A  strip 3    ONETOUCH ULTRA2 kit USE TO TEST DAILY  0    adhesive bandage 1 X 3 " Bndg       simvastatin (ZOCOR) 40 MG tablet Take 1 tablet (40 mg total) by mouth every evening. 90 tablet 3     No current facility-administered medications on file prior to visit.        Review of Systems   Constitutional: Positive for fatigue.   Eyes: Negative for visual disturbance.   Respiratory: Negative for shortness of breath.    Cardiovascular: Negative for chest pain and palpitations.   Gastrointestinal: Negative.    Endocrine: Negative for polydipsia, polyphagia and polyuria.   Musculoskeletal: Positive for arthralgias.   Skin: Negative for pallor.   Neurological: Negative for dizziness, tremors, seizures, speech difficulty, weakness and headaches.   Psychiatric/Behavioral: Negative for confusion. The patient is not nervous/anxious.        Objective:      Physical Exam   Constitutional: He is oriented to person, place, and time. He appears well-developed and well-nourished. No distress.   HENT:   Head: Normocephalic and atraumatic.   Right Ear: External ear normal.   Left Ear: External ear normal.   Mouth/Throat: No oropharyngeal exudate.   Eyes: EOM are normal. Pupils are equal, round, and reactive to light. Right eye exhibits no discharge. Left eye exhibits no discharge. No scleral icterus.   Neck: Normal range of motion. Neck supple. No thyromegaly present.   Cardiovascular: Normal rate and regular rhythm.  Exam reveals no gallop and no friction rub.    No murmur heard.  Pulses:       Dorsalis pedis pulses are " 2+ on the right side, and 2+ on the left side.        Posterior tibial pulses are 1+ on the right side, and 1+ on the left side.   Pulmonary/Chest: Effort normal and breath sounds normal. No respiratory distress. He has no wheezes. He has no rales. He exhibits no tenderness.   Musculoskeletal:        Right foot: There is no deformity.        Left foot: There is no deformity.   Feet:   Right Foot:   Protective Sensation: 10 sites tested. 7 sites sensed.   Skin Integrity: Positive for callus and dry skin. Negative for ulcer or skin breakdown.   Left Foot:   Protective Sensation: 10 sites tested. 8 sites sensed.   Skin Integrity: Positive for callus and dry skin. Negative for ulcer or skin breakdown.   Neurological: He is alert and oriented to person, place, and time.   Skin: Skin is dry. He is not diaphoretic.   Psychiatric: He has a normal mood and affect. His behavior is normal.   Vitals reviewed.      Assessment:       1. Type 2 diabetes mellitus without complication, without long-term current use of insulin    2. Dyslipidemia    3. Diabetic polyneuropathy associated with type 2 diabetes mellitus        Plan:       Cecilio COUGHLIN was seen today for follow-up.    Diagnoses and all orders for this visit:    Type 2 diabetes mellitus without complication, without long-term current use of insulin  -     glimepiride (AMARYL) 1 MG tablet; Take 1 tablet (1 mg total) by mouth before breakfast.  -     metFORMIN (GLUCOPHAGE) 500 MG tablet; Take 2 po in am and 2 po in evening    Dyslipidemia  Uncontrolled, start pravastatin    Diabetic polyneuropathy associated with type 2 diabetes mellitus  Evidence of mild neuropathy/ loss light touch  Likely diabetes, not painful   Discussed with patient, regular inspection, etc

## 2018-06-22 ENCOUNTER — LAB VISIT (OUTPATIENT)
Dept: LAB | Facility: HOSPITAL | Age: 78
End: 2018-06-22
Attending: FAMILY MEDICINE
Payer: MEDICARE

## 2018-06-22 DIAGNOSIS — E11.9 TYPE 2 DIABETES MELLITUS WITHOUT COMPLICATION, WITHOUT LONG-TERM CURRENT USE OF INSULIN: ICD-10-CM

## 2018-06-22 DIAGNOSIS — E78.5 DYSLIPIDEMIA: ICD-10-CM

## 2018-06-22 LAB
ALBUMIN SERPL BCP-MCNC: 4.1 G/DL
ALP SERPL-CCNC: 54 U/L
ALT SERPL W/O P-5'-P-CCNC: 19 U/L
ANION GAP SERPL CALC-SCNC: 12 MMOL/L
AST SERPL-CCNC: 17 U/L
BILIRUB SERPL-MCNC: 0.7 MG/DL
BUN SERPL-MCNC: 24 MG/DL
CALCIUM SERPL-MCNC: 9.7 MG/DL
CHLORIDE SERPL-SCNC: 107 MMOL/L
CHOLEST SERPL-MCNC: 148 MG/DL
CHOLEST/HDLC SERPL: 4.1 {RATIO}
CO2 SERPL-SCNC: 22 MMOL/L
CREAT SERPL-MCNC: 1.4 MG/DL
EST. GFR  (AFRICAN AMERICAN): 54.9 ML/MIN/1.73 M^2
EST. GFR  (NON AFRICAN AMERICAN): 47.4 ML/MIN/1.73 M^2
ESTIMATED AVG GLUCOSE: 134 MG/DL
GLUCOSE SERPL-MCNC: 144 MG/DL
HBA1C MFR BLD HPLC: 6.3 %
HDLC SERPL-MCNC: 36 MG/DL
HDLC SERPL: 24.3 %
LDLC SERPL CALC-MCNC: 91.6 MG/DL
NONHDLC SERPL-MCNC: 112 MG/DL
POTASSIUM SERPL-SCNC: 4.3 MMOL/L
PROT SERPL-MCNC: 7.2 G/DL
SODIUM SERPL-SCNC: 141 MMOL/L
TRIGL SERPL-MCNC: 102 MG/DL

## 2018-06-22 PROCEDURE — 80061 LIPID PANEL: CPT

## 2018-06-22 PROCEDURE — 83036 HEMOGLOBIN GLYCOSYLATED A1C: CPT

## 2018-06-22 PROCEDURE — 36415 COLL VENOUS BLD VENIPUNCTURE: CPT | Mod: PN

## 2018-06-22 PROCEDURE — 80053 COMPREHEN METABOLIC PANEL: CPT

## 2018-07-09 ENCOUNTER — OFFICE VISIT (OUTPATIENT)
Dept: FAMILY MEDICINE | Facility: CLINIC | Age: 78
End: 2018-07-09
Payer: MEDICARE

## 2018-07-09 VITALS
SYSTOLIC BLOOD PRESSURE: 126 MMHG | DIASTOLIC BLOOD PRESSURE: 68 MMHG | WEIGHT: 171.88 LBS | OXYGEN SATURATION: 97 % | BODY MASS INDEX: 26.05 KG/M2 | HEIGHT: 68 IN | HEART RATE: 76 BPM

## 2018-07-09 DIAGNOSIS — E78.5 HYPERLIPIDEMIA, UNSPECIFIED HYPERLIPIDEMIA TYPE: ICD-10-CM

## 2018-07-09 DIAGNOSIS — Z12.5 SPECIAL SCREENING FOR MALIGNANT NEOPLASM OF PROSTATE: ICD-10-CM

## 2018-07-09 DIAGNOSIS — R39.9 LOWER URINARY TRACT SYMPTOMS (LUTS): ICD-10-CM

## 2018-07-09 DIAGNOSIS — E11.42 DIABETIC POLYNEUROPATHY ASSOCIATED WITH TYPE 2 DIABETES MELLITUS: ICD-10-CM

## 2018-07-09 DIAGNOSIS — E11.9 TYPE 2 DIABETES MELLITUS WITHOUT COMPLICATION, WITHOUT LONG-TERM CURRENT USE OF INSULIN: Primary | ICD-10-CM

## 2018-07-09 DIAGNOSIS — N41.1 CHRONIC PROSTATITIS: ICD-10-CM

## 2018-07-09 PROCEDURE — 99214 OFFICE O/P EST MOD 30 MIN: CPT | Mod: S$GLB,,, | Performed by: FAMILY MEDICINE

## 2018-07-09 PROCEDURE — 99999 PR PBB SHADOW E&M-EST. PATIENT-LVL III: CPT | Mod: PBBFAC,,, | Performed by: FAMILY MEDICINE

## 2018-07-09 RX ORDER — ASPIRIN 81 MG/1
81 TABLET ORAL DAILY
COMMUNITY
End: 2019-03-19

## 2018-07-09 RX ORDER — DOXYCYCLINE 100 MG/1
100 CAPSULE ORAL 2 TIMES DAILY
Qty: 28 CAPSULE | Refills: 1 | Status: SHIPPED | OUTPATIENT
Start: 2018-07-09 | End: 2018-07-23

## 2018-07-09 NOTE — PROGRESS NOTES
THIS DOCUMENT WAS MADE IN PART WITH VOICE RECOGNITION SOFTWARE.  OCCASIONALLY THIS SOFTWARE WILL MISINTERPRET WORDS OR PHRASES.      Cecilio COUGHLIN Coy  1940    Subjective     Chief Complaint   Patient presents with    Follow-up       HPI    Diagnoses and all orders for this visit:    Type 2 diabetes mellitus without complication, without long-term current use of insulin  Mild worsening, increase in a1c but still control is reasonable  -Eye exam within one year:  11/17  -Neuropathy?  yes  -Foot problems or wounds?    -last foot exam:  1/2018  -On ACE/ARB:  No, no proteinuria  -On Statin:  yes   Last LDL:   91  -AM Glucose avg:    PM Glucose avg:    -Hypoglycemia:  no  -last A1c:  6.3  -nephropathy/proteinuria?   no    Last urine microalbumin / creatinine ratio within one year:  yes    Diabetic polyneuropathy associated with type 2 diabetes mellitus  Stable    Hyperlipidemia, unspecified hyperlipidemia type  Mild improvement    Increase in urinary freq.  No dysuria, no hematuria, nocturia x 2 stable, he denies straining or significant hesitancy.  Denies any previous problems with prostate or lower urinary symptoms.  Last PSA on file was 3 years ago and it was less than 1.0.    Active Ambulatory Problems     Diagnosis Date Noted    Hyperlipidemia 03/24/2014    Type 2 diabetes mellitus without complication 07/15/2016    Diabetic polyneuropathy associated with type 2 diabetes mellitus 01/30/2018     Resolved Ambulatory Problems     Diagnosis Date Noted    Tobacco dependence 07/15/2016     Past Medical History:   Diagnosis Date    Diabetes 2001    Hyperlipidemia     Type 2 diabetes mellitus          Review of Systems   Constitutional: Positive for fatigue.   Eyes: Negative for visual disturbance.   Respiratory: Negative for shortness of breath.    Cardiovascular: Negative for chest pain and palpitations.   Gastrointestinal: Positive for constipation and diarrhea. Negative for nausea.        Occasional  "diarrhea followed by constipation, maybe once per months   Endocrine: Negative for polydipsia, polyphagia and polyuria.   Genitourinary: Positive for frequency.   Musculoskeletal: Positive for arthralgias.   Skin: Negative for pallor.   Neurological: Negative for dizziness, tremors, seizures, speech difficulty, weakness and headaches.   Psychiatric/Behavioral: Negative for confusion. The patient is not nervous/anxious.        Objective     Physical Exam   Constitutional: He is oriented to person, place, and time. He appears well-developed and well-nourished. No distress.   HENT:   Head: Normocephalic and atraumatic.   Eyes: No scleral icterus.   Neck: Normal range of motion. Neck supple.   Cardiovascular: Normal rate, regular rhythm and normal heart sounds.  Exam reveals no gallop.    No murmur heard.  Pulmonary/Chest: Effort normal. No respiratory distress.   Genitourinary:   Genitourinary Comments: Prostate ~ 30 grams, mildly boggy, no nodule and not tender   Neurological: He is alert and oriented to person, place, and time. He has normal reflexes.   Skin: Skin is warm and dry. He is not diaphoretic.   Psychiatric: He has a normal mood and affect. His behavior is normal.   Vitals reviewed.    Vitals:    07/09/18 0938   BP: 126/68   BP Location: Left arm   Patient Position: Sitting   BP Method: Medium (Manual)   Pulse: 76   SpO2: 97%   Weight: 78 kg (171 lb 13.6 oz)   Height: 5' 8" (1.727 m)     Results for orders placed or performed in visit on 06/22/18   Comprehensive metabolic panel   Result Value Ref Range    Sodium 141 136 - 145 mmol/L    Potassium 4.3 3.5 - 5.1 mmol/L    Chloride 107 95 - 110 mmol/L    CO2 22 (L) 23 - 29 mmol/L    Glucose 144 (H) 70 - 110 mg/dL    BUN, Bld 24 (H) 8 - 23 mg/dL    Creatinine 1.4 0.5 - 1.4 mg/dL    Calcium 9.7 8.7 - 10.5 mg/dL    Total Protein 7.2 6.0 - 8.4 g/dL    Albumin 4.1 3.5 - 5.2 g/dL    Total Bilirubin 0.7 0.1 - 1.0 mg/dL    Alkaline Phosphatase 54 (L) 55 - 135 U/L    " AST 17 10 - 40 U/L    ALT 19 10 - 44 U/L    Anion Gap 12 8 - 16 mmol/L    eGFR if African American 54.9 (A) >60 mL/min/1.73 m^2    eGFR if non  47.4 (A) >60 mL/min/1.73 m^2   Lipid panel   Result Value Ref Range    Cholesterol 148 120 - 199 mg/dL    Triglycerides 102 30 - 150 mg/dL    HDL 36 (L) 40 - 75 mg/dL    LDL Cholesterol 91.6 63.0 - 159.0 mg/dL    HDL/Chol Ratio 24.3 20.0 - 50.0 %    Total Cholesterol/HDL Ratio 4.1 2.0 - 5.0    Non-HDL Cholesterol 112 mg/dL   Hemoglobin A1c   Result Value Ref Range    Hemoglobin A1C 6.3 (H) 4.0 - 5.6 %    Estimated Avg Glucose 134 (H) 68 - 131 mg/dL         Cecilio OCUGHLIN was seen today for follow-up.    Diagnoses and all orders for this visit:    Type 2 diabetes mellitus without complication, without long-term current use of insulin  -     Hemoglobin A1c; Future  -     Lipid panel; Future  -     Microalbumin/creatinine urine ratio; Future  -     Comprehensive metabolic panel; Future    Diabetic polyneuropathy associated with type 2 diabetes mellitus  Neuropathy is stable    Hyperlipidemia, unspecified hyperlipidemia type  Mild improvement but HDL low, need more exercise to improve HDL    Lower urinary tract symptoms (LUTS)  -     PSA, Screening; Future  -     doxycycline (VIBRAMYCIN) 100 MG Cap; Take 1 capsule (100 mg total) by mouth 2 (two) times daily. for 14 days  Possible mild prostatitis, because symptoms are new will treat with an antibiotic.  Doxycycline for 2 weeks, if no improvement then stop, if mild improvement then refill for another 2 weeks.  If not improving then Urology.  PSA, but will have to return to do this in 4-5 weeks to give time for the infection to clear.    Special screening for malignant neoplasm of prostate  -     PSA, Screening; Future    Chronic prostatitis  -     doxycycline (VIBRAMYCIN) 100 MG Cap; Take 1 capsule (100 mg total) by mouth 2 (two) times daily. for 14 days  As above

## 2018-08-13 ENCOUNTER — OFFICE VISIT (OUTPATIENT)
Dept: PODIATRY | Facility: CLINIC | Age: 78
End: 2018-08-13
Payer: MEDICARE

## 2018-08-13 VITALS — HEIGHT: 68 IN | WEIGHT: 171.94 LBS | BODY MASS INDEX: 26.06 KG/M2

## 2018-08-13 DIAGNOSIS — L84 HELOMA MOLLE: Primary | ICD-10-CM

## 2018-08-13 DIAGNOSIS — M79.674 PAIN IN TOE OF RIGHT FOOT: ICD-10-CM

## 2018-08-13 DIAGNOSIS — M20.41 HAMMER TOE OF RIGHT FOOT: ICD-10-CM

## 2018-08-13 DIAGNOSIS — E11.42 TYPE 2 DIABETES MELLITUS WITH PERIPHERAL NEUROPATHY: ICD-10-CM

## 2018-08-13 DIAGNOSIS — I73.9 PVD (PERIPHERAL VASCULAR DISEASE): ICD-10-CM

## 2018-08-13 PROCEDURE — 11055 PARING/CUTG B9 HYPRKER LES 1: CPT | Mod: Q9,S$GLB,, | Performed by: PODIATRIST

## 2018-08-13 PROCEDURE — 99202 OFFICE O/P NEW SF 15 MIN: CPT | Mod: 25,S$GLB,, | Performed by: PODIATRIST

## 2018-08-13 PROCEDURE — 99999 PR PBB SHADOW E&M-EST. PATIENT-LVL III: CPT | Mod: PBBFAC,,, | Performed by: PODIATRIST

## 2018-08-27 NOTE — PROGRESS NOTES
Subjective:      Patient ID: Cecilio Cespedes is a 78 y.o. male.    Chief Complaint: Foot Problem (little toe right foot   PCP  Mason West   7/9/18) and Diabetes Mellitus (A!C  6.3  6/22/18)    Cecilio COUGHLIN is a 78 y.o. male who presents to the clinic with a chief complaint of right 5th toe pain. Cecilio COUGHLIN has a past medical history of Diabetes (2001), Hyperlipidemia, and Type 2 diabetes mellitus. Patient relates no major problem with feet. Only complaints today consist of painful, hard spot on his right 5th toe.  He reports corn like lesion has been present for several months but has worsened, and he is no longer able to scrape the lesion down enough to eliminate pain.  Patient reports difficulty reaching right 5th toe.  He rates pain as 3/10 and describes it as achy with occasional sharp pain.  He is also area of cutting himself.  Patient denies forming any previous remedies other than the remaining lesion himself.  Patient denies any other pedal complaints at this time.    PCP: Mason West MD    Date Last Seen by PCP: 7/9/18    Current shoe gear: Slip-on shoes    Hemoglobin A1C   Date Value Ref Range Status   06/22/2018 6.3 (H) 4.0 - 5.6 % Final     Comment:     ADA Screening Guidelines:  5.7-6.4%  Consistent with prediabetes  >or=6.5%  Consistent with diabetes  High levels of fetal hemoglobin interfere with the HbA1C  assay. Heterozygous hemoglobin variants (HbS, HgC, etc)do  not significantly interfere with this assay.   However, presence of multiple variants may affect accuracy.     01/23/2018 5.7 (H) 4.0 - 5.6 % Final     Comment:     According to ADA guidelines, hemoglobin A1c <7.0% represents  optimal control in non-pregnant diabetic patients. Different  metrics may apply to specific patient populations.   Standards of Medical Care in Diabetes-2016.  For the purpose of screening for the presence of diabetes:  <5.7%     Consistent with the absence of diabetes  5.7-6.4%  Consistent with  increasing risk for diabetes   (prediabetes)  >or=6.5%  Consistent with diabetes  Currently, no consensus exists for use of hemoglobin A1c  for diagnosis of diabetes for children.  This Hemoglobin A1c assay has significant interference with fetal   hemoglobin   (HbF). The results are invalid for patients with abnormal amounts of   HbF,   including those with known Hereditary Persistence   of Fetal Hemoglobin. Heterozygous hemoglobin variants (HbAS, HbAC,   HbAD, HbAE, HbA2) do not significantly interfere with this assay;   however, presence of multiple variants in a sample may impact the %   interference.     07/20/2017 6.2 (H) 4.0 - 5.6 % Final     Comment:     According to ADA guidelines, hemoglobin A1c <7.0% represents  optimal control in non-pregnant diabetic patients. Different  metrics may apply to specific patient populations.   Standards of Medical Care in Diabetes-2016.  For the purpose of screening for the presence of diabetes:  <5.7%     Consistent with the absence of diabetes  5.7-6.4%  Consistent with increasing risk for diabetes   (prediabetes)  >or=6.5%  Consistent with diabetes  Currently, no consensus exists for use of hemoglobin A1c  for diagnosis of diabetes for children.  This Hemoglobin A1c assay has significant interference with fetal   hemoglobin   (HbF). The results are invalid for patients with abnormal amounts of   HbF,   including those with known Hereditary Persistence   of Fetal Hemoglobin. Heterozygous hemoglobin variants (HbAS, HbAC,   HbAD, HbAE, HbA2) do not significantly interfere with this assay;   however, presence of multiple variants in a sample may impact the %   interference.             Review of Systems   Constitution: Negative for chills, diaphoresis and fever.   Cardiovascular: Positive for leg swelling. Negative for chest pain and claudication.   Respiratory: Negative for cough, shortness of breath and wheezing.    Skin: Negative for color change, itching, nail changes  and rash.   Musculoskeletal: Positive for arthritis, joint pain and stiffness. Negative for falls.   Gastrointestinal: Negative for diarrhea, nausea and vomiting.   Neurological: Positive for sensory change. Negative for loss of balance, numbness and paresthesias.           Objective:       B/L pedal exam performed today.  Physical Exam   Constitutional: He is oriented to person, place, and time. He appears well-developed and well-nourished. No distress.   HENT:   Head: Normocephalic and atraumatic.   Eyes: Conjunctivae and EOM are normal. Pupils are equal, round, and reactive to light.   Neck: Normal range of motion.   Cardiovascular:   Pulses:       Dorsalis pedis pulses are 1+ on the right side, and 1+ on the left side.        Posterior tibial pulses are 1+ on the right side, and 1+ on the left side.   Pedal pulses palpable 1/4 DP & PT B/L LE.  CFT is 3 seconds to B/L hallux.  Skin temperature is warm to cool proximal tibia to distal toes B/L LE without localized increase in calor noted.  +1/4 pitting edema noted to B/L LE.  No erythema or ecchymosis noted B/L foot or ankle.  Hair growth is diminished distally B/L LE.     Pulmonary/Chest: Effort normal and breath sounds normal. No respiratory distress. He has no wheezes.   Musculoskeletal: He exhibits edema, tenderness and deformity.        Right foot: There is decreased range of motion and deformity.        Left foot: There is decreased range of motion and deformity.   Generalized decreased ROM noted to B/L ankle and pedal joints without pain or crepitus.  Muscle strength is 5/5 for all B/L lower extremity muscle groups tested.  Semiflexible flexion contracture of digits 2-5 B/L with adductovarus component of the right 5th digit.   Feet:   Right Foot:   Protective Sensation: 10 sites tested. 8 sites sensed.   Skin Integrity: Positive for skin breakdown and callus. Negative for ulcer, blister, erythema, warmth or dry skin.   Left Foot:   Protective Sensation: 10  sites tested. 8 sites sensed.   Skin Integrity: Negative for ulcer, blister, skin breakdown, erythema, warmth, callus or dry skin.   Neurological: He is alert and oriented to person, place, and time. He has normal strength. He displays normal reflexes. A sensory deficit is present. He displays no Babinski's sign on the right side. He displays no Babinski's sign on the left side.   Reflex Scores:       Achilles reflexes are 2+ on the right side and 2+ on the left side.  Protective sensation is diminished to 8/10 sites to B/L foot via Fisher-Curry monofilament.  Vibratory sensation is diminished distally B/L LE.  Proprioceptive and light touch sensations are grossly intact to B/L LE.  Achilles DTR and Chaddock superficial tendon reflexes are intact B/L LE.  No Babinski or clonus sign noted B/L foot. Tenderness to palpation noted to lesion on the medial aspect of the right 5th digit at the DIPJ.   Skin: Skin is warm and dry. Capillary refill takes 2 to 3 seconds. Lesion and rash noted. No abrasion, no bruising, no burn, no ecchymosis, no laceration and no petechiae noted. He is not diaphoretic. No cyanosis or erythema. Nails show no clubbing.   Toenails 1-5 B/L are WNL in length, thickness, and coloration.  Web spaces 1-4 B/L are clean, dry, and intact with exception of right 4th webspace.  Thick, focal, hyperkeratotic lesion with underlying skin breakdown noted to the medial aspect of the right 5th digit DIPJ with open wound measuring 0.1 cm cubed.  No rachel signs of infection noted. No sinus tracking, undermining, or drainage noted to wound.  Wound base is superficial dermis.  No other open lesions, suspicious lesions, or subcutaneous nodules noted B/L foot.   Psychiatric: He has a normal mood and affect. His behavior is normal. Judgment and thought content normal.   Nursing note and vitals reviewed.          Assessment:       Encounter Diagnoses   Name Primary?    Heloma molle - Right Foot Yes    Hammer toe of  right foot - Right Foot     Pain in toe of right foot - Right Foot     Type 2 diabetes mellitus with peripheral neuropathy     PVD (peripheral vascular disease)          Plan:       Cecilio COUGHLIN was seen today for foot problem and diabetes mellitus.    Diagnoses and all orders for this visit:    Woo nevarez - Right Foot    Hammer toe of right foot - Right Foot    Pain in toe of right foot - Right Foot    Type 2 diabetes mellitus with peripheral neuropathy    PVD (peripheral vascular disease)      I counseled the patient on his conditions, their implications and medical management.    - Discussed with patient the importance of checking feet daily for wounds.    - Instructed him to apply moisturizer to lower extremities daily but not between toes.    - Advised him to wear shoes at all times when ambulating with wider and deeper toe box and with toe spacer between right 4th and 5th toes.    - Recommended patient clean feet daily making sure to dry between toes afterward and wear clean, white socks to make checking for wound drainage easier to spot.    - Urged him to elevate feet throughout the day to reduce swelling.    - Advised patient to get leather shoes stretched to alleviate pressure on toes.    - Follow up in 6 months for diabetic foot check or sooner if new or worsening condition arises.      Carey Jones DPM

## 2018-08-27 NOTE — PATIENT INSTRUCTIONS
- Check feet daily for wounds.    - Apply moisturizer to lower extremities daily but not between toes.    - Wear shoes at all times when ambulating.    - Clean feet daily making sure to dry between toes afterward and wear clean, white socks to make checking for wound drainage easier to spot.    - Elevate feet throughout the day to reduce swelling.    - Wear toe spacer at all times when ambulating between right 4th & 5th toes.    - Wear accommodative shoes with wider and deeper toe box.    - Get leather shoes stretched to alleviate pressure on toes.    - Follow up in 6 months for diabetic foot check or sooner if new or worsening condition arises.

## 2018-09-11 ENCOUNTER — OFFICE VISIT (OUTPATIENT)
Dept: PODIATRY | Facility: CLINIC | Age: 78
End: 2018-09-11
Payer: MEDICARE

## 2018-09-11 VITALS — BODY MASS INDEX: 26.06 KG/M2 | WEIGHT: 171.94 LBS | HEIGHT: 68 IN

## 2018-09-11 DIAGNOSIS — M20.41 HAMMER TOE OF RIGHT FOOT: ICD-10-CM

## 2018-09-11 DIAGNOSIS — M79.674 PAIN IN TOE OF RIGHT FOOT: ICD-10-CM

## 2018-09-11 DIAGNOSIS — E11.42 TYPE 2 DIABETES MELLITUS WITH PERIPHERAL NEUROPATHY: ICD-10-CM

## 2018-09-11 DIAGNOSIS — L84 HELOMA MOLLE: Primary | ICD-10-CM

## 2018-09-11 DIAGNOSIS — I73.9 PVD (PERIPHERAL VASCULAR DISEASE): ICD-10-CM

## 2018-09-11 PROCEDURE — 99212 OFFICE O/P EST SF 10 MIN: CPT | Mod: S$PBB,,, | Performed by: PODIATRIST

## 2018-09-11 PROCEDURE — 99999 PR PBB SHADOW E&M-EST. PATIENT-LVL III: CPT | Mod: PBBFAC,,, | Performed by: PODIATRIST

## 2018-09-11 PROCEDURE — 1101F PT FALLS ASSESS-DOCD LE1/YR: CPT | Mod: ,,, | Performed by: PODIATRIST

## 2018-09-11 PROCEDURE — 99213 OFFICE O/P EST LOW 20 MIN: CPT | Mod: PBBFAC,PN | Performed by: PODIATRIST

## 2018-09-11 NOTE — PROGRESS NOTES
Subjective:      Patient ID: Cecilio Cespedes is a 78 y.o. male.    Chief Complaint: Wound Care (4 week f/u right foot ulcer, PCP--07/09/2018) and Diabetes Mellitus (A1c-6.3-06/22/2018)    Cecilio COUGHLIN is a 78 y.o. male who presents to the clinic for follow up of right 5th toe pain. Cecilio COUGHLIN has a past medical history of Diabetes (2001), Hyperlipidemia, and Type 2 diabetes mellitus. Patient relates hard spot on his right 5th toe has returned but isn't painful yet.  He reports losing toe spacer shortly after last visit and hasn't gotten a new one.  Patient relates difficulty reaching right 5th toe.  He rates pain as 0/10 today but has history of cutting himself when trying to trim it himself.  Patient denies any other pedal complaints at this time.    PCP: Mason West MD    Date Last Seen by PCP: 7/9/18    Current shoe gear: Slip-on shoes    Hemoglobin A1C   Date Value Ref Range Status   06/22/2018 6.3 (H) 4.0 - 5.6 % Final     Comment:     ADA Screening Guidelines:  5.7-6.4%  Consistent with prediabetes  >or=6.5%  Consistent with diabetes  High levels of fetal hemoglobin interfere with the HbA1C  assay. Heterozygous hemoglobin variants (HbS, HgC, etc)do  not significantly interfere with this assay.   However, presence of multiple variants may affect accuracy.     01/23/2018 5.7 (H) 4.0 - 5.6 % Final     Comment:     According to ADA guidelines, hemoglobin A1c <7.0% represents  optimal control in non-pregnant diabetic patients. Different  metrics may apply to specific patient populations.   Standards of Medical Care in Diabetes-2016.  For the purpose of screening for the presence of diabetes:  <5.7%     Consistent with the absence of diabetes  5.7-6.4%  Consistent with increasing risk for diabetes   (prediabetes)  >or=6.5%  Consistent with diabetes  Currently, no consensus exists for use of hemoglobin A1c  for diagnosis of diabetes for children.  This Hemoglobin A1c assay has significant  interference with fetal   hemoglobin   (HbF). The results are invalid for patients with abnormal amounts of   HbF,   including those with known Hereditary Persistence   of Fetal Hemoglobin. Heterozygous hemoglobin variants (HbAS, HbAC,   HbAD, HbAE, HbA2) do not significantly interfere with this assay;   however, presence of multiple variants in a sample may impact the %   interference.     07/20/2017 6.2 (H) 4.0 - 5.6 % Final     Comment:     According to ADA guidelines, hemoglobin A1c <7.0% represents  optimal control in non-pregnant diabetic patients. Different  metrics may apply to specific patient populations.   Standards of Medical Care in Diabetes-2016.  For the purpose of screening for the presence of diabetes:  <5.7%     Consistent with the absence of diabetes  5.7-6.4%  Consistent with increasing risk for diabetes   (prediabetes)  >or=6.5%  Consistent with diabetes  Currently, no consensus exists for use of hemoglobin A1c  for diagnosis of diabetes for children.  This Hemoglobin A1c assay has significant interference with fetal   hemoglobin   (HbF). The results are invalid for patients with abnormal amounts of   HbF,   including those with known Hereditary Persistence   of Fetal Hemoglobin. Heterozygous hemoglobin variants (HbAS, HbAC,   HbAD, HbAE, HbA2) do not significantly interfere with this assay;   however, presence of multiple variants in a sample may impact the %   interference.             Review of Systems   Cardiovascular: Positive for leg swelling. Negative for chest pain and claudication.   Skin: Negative for color change, itching, nail changes and rash.   Musculoskeletal: Positive for arthritis, joint pain and stiffness. Negative for falls.   Neurological: Positive for sensory change. Negative for loss of balance, numbness and paresthesias.           Objective:       Right pedal exam performed today.  Physical Exam   Constitutional: He is oriented to person, place, and time. He appears  well-developed and well-nourished. No distress.   HENT:   Head: Normocephalic and atraumatic.   Eyes: Conjunctivae and EOM are normal. Pupils are equal, round, and reactive to light.   Neck: Normal range of motion.   Cardiovascular:   Pulses:       Dorsalis pedis pulses are 1+ on the right side.        Posterior tibial pulses are 1+ on the right side.   Pedal pulses palpable 1/4 DP & PT RLE.  CFT is 3 seconds to the right hallux.  Skin temperature is warm to cool proximal tibia to distal toes RLE without localized increase in calor noted.  +1/4 pitting edema noted to RLE.  No erythema or ecchymosis noted to the foot or ankle.  Hair growth is diminished distally RLE.     Pulmonary/Chest: Effort normal and breath sounds normal. No respiratory distress. He has no wheezes.   Musculoskeletal: He exhibits edema, tenderness and deformity.        Right foot: There is decreased range of motion and deformity.   Generalized decreased ROM noted to the ankle and pedal joints without pain or crepitus.  Muscle strength is 5/5 for all RLE muscle groups tested.  Semiflexible flexion contracture of digits 2-5 RLE with adductovarus component of the right 5th digit.   Feet:   Right Foot:   Protective Sensation: 10 sites tested. 8 sites sensed.   Skin Integrity: Positive for skin breakdown and callus. Negative for ulcer, blister, erythema, warmth or dry skin.   Neurological: He is alert and oriented to person, place, and time. He has normal strength. He displays normal reflexes. A sensory deficit is present. Gait normal. He displays no Babinski's sign on the right side.   Reflex Scores:       Achilles reflexes are 2+ on the right side.  Protective sensation is diminished to 8/10 sites to right foot via [place] monofilament.  Vibratory sensation is diminished distally RLE.  Proprioceptive and light touch sensations are grossly intact to RLE.  Achilles DTR and [place]addock superficial tendon reflexes are intact RLE.  No Babinski or  clonus sign noted right foot. Tenderness to palpation noted to lesion on the medial aspect of the right 5th digit at the DIPJ.   Skin: Skin is warm, dry and intact. Capillary refill takes 2 to 3 seconds. Lesion noted. No abrasion, no bruising, no burn, no ecchymosis, no laceration, no petechiae and no rash noted. He is not diaphoretic. No cyanosis or erythema. Nails show no clubbing.   Toenails 1-5 are WNL in length, thickness, and coloration.  Web spaces 1-4 are clean, dry, and intact with exception of right 4th webspace.  Thick, focal, hyperkeratotic lesion with hemosiderin deposition but without underlying skin breakdown noted to the medial aspect of the right 5th digit DIPJ.  No open lesions, suspicious lesions, or subcutaneous nodules noted to the foot.   Psychiatric: He has a normal mood and affect. His behavior is normal. Judgment and thought content normal.   Nursing note and vitals reviewed.          Assessment:       Encounter Diagnoses   Name Primary?    Heloma molle - Right Foot Yes    Hammer toe of right foot - Right Foot     Pain in toe of right foot - Right Foot     Type 2 diabetes mellitus with peripheral neuropathy     PVD (peripheral vascular disease)          Plan:       Cecilio COUGHLIN was seen today for wound care and diabetes mellitus.    Diagnoses and all orders for this visit:    Heloma molle - Right Foot    Hammer toe of right foot - Right Foot    Pain in toe of right foot - Right Foot    Type 2 diabetes mellitus with peripheral neuropathy    PVD (peripheral vascular disease)      I counseled the patient on his conditions, their implications and medical management.    - With patient's permission, pared hyperkeratotic tissue via #15 blade scalpel down to the level of intact skin to patient's tolerance without incident.    - Dispensed to patient and placed one toe spacer with aperture in right 4th webspace.    Patient was given the following recommendations and instructions:  Patient  Instructions   - Check feet daily for wounds.    - Apply moisturizer to lower extremities daily but not between toes.    - Wear shoes at all times when ambulating.    - Clean feet daily making sure to dry between toes afterward and wear clean, white socks to make checking for wound drainage easier to spot.    - Elevate feet throughout the day to reduce swelling.    - Wear toe spacer at all times when ambulating between right 4th & 5th toes.    - Wear accommodative shoes with wider and deeper toe box.    - Get leather shoes stretched to alleviate pressure on toes.    - Follow up in 9 weeks for nail care and as needed for thomas nevarez.        Carey Jones, MICHELLEM

## 2018-09-11 NOTE — PATIENT INSTRUCTIONS
- Check feet daily for wounds.    - Apply moisturizer to lower extremities daily but not between toes.    - Wear shoes at all times when ambulating.    - Clean feet daily making sure to dry between toes afterward and wear clean, white socks to make checking for wound drainage easier to spot.    - Elevate feet throughout the day to reduce swelling.    - Wear toe spacer at all times when ambulating between right 4th & 5th toes.    - Wear accommodative shoes with wider and deeper toe box.    - Get leather shoes stretched to alleviate pressure on toes.    - Follow up in 9 weeks for nail care and as needed for thomas nevarez.

## 2018-09-19 ENCOUNTER — OFFICE VISIT (OUTPATIENT)
Dept: FAMILY MEDICINE | Facility: CLINIC | Age: 78
End: 2018-09-19
Payer: MEDICARE

## 2018-09-19 VITALS
TEMPERATURE: 99 F | DIASTOLIC BLOOD PRESSURE: 78 MMHG | WEIGHT: 170.31 LBS | HEART RATE: 92 BPM | SYSTOLIC BLOOD PRESSURE: 126 MMHG | HEIGHT: 68 IN | BODY MASS INDEX: 25.81 KG/M2

## 2018-09-19 DIAGNOSIS — E11.42 DIABETIC POLYNEUROPATHY ASSOCIATED WITH TYPE 2 DIABETES MELLITUS: ICD-10-CM

## 2018-09-19 DIAGNOSIS — E78.5 HYPERLIPIDEMIA, UNSPECIFIED HYPERLIPIDEMIA TYPE: ICD-10-CM

## 2018-09-19 DIAGNOSIS — I73.9 PVD (PERIPHERAL VASCULAR DISEASE): ICD-10-CM

## 2018-09-19 DIAGNOSIS — E78.5 DYSLIPIDEMIA: ICD-10-CM

## 2018-09-19 DIAGNOSIS — E11.9 TYPE 2 DIABETES MELLITUS WITHOUT COMPLICATION, WITHOUT LONG-TERM CURRENT USE OF INSULIN: Primary | ICD-10-CM

## 2018-09-19 PROCEDURE — 1101F PT FALLS ASSESS-DOCD LE1/YR: CPT | Mod: ,,, | Performed by: FAMILY MEDICINE

## 2018-09-19 PROCEDURE — 99213 OFFICE O/P EST LOW 20 MIN: CPT | Mod: PBBFAC,PN | Performed by: FAMILY MEDICINE

## 2018-09-19 PROCEDURE — 99214 OFFICE O/P EST MOD 30 MIN: CPT | Mod: S$PBB,,, | Performed by: FAMILY MEDICINE

## 2018-09-19 PROCEDURE — 99999 PR PBB SHADOW E&M-EST. PATIENT-LVL III: CPT | Mod: PBBFAC,,, | Performed by: FAMILY MEDICINE

## 2018-09-19 NOTE — PROGRESS NOTES
THIS DOCUMENT WAS MADE IN PART WITH VOICE RECOGNITION SOFTWARE.  OCCASIONALLY THIS SOFTWARE WILL MISINTERPRET WORDS OR PHRASES.      Cecilio COUGHLIN Coy  1940    Cecilio COUGHLIN was seen today for follow-up.    Diagnoses and all orders for this visit:    Type 2 diabetes mellitus without complication, without long-term current use of insulin   this is a chronic condition that has been stable and well-controlled    Hyperlipidemia, unspecified hyperlipidemia type   HDL has dropped. Discussed regular exercise    Dyslipidemia   as above     diabetic neuropathy is stable not painful.     PVD,  Stable no claudication, no foot or leg ulcers     labs including A1c and lipid ordered. Recommend checking in about three months and following afterwards    Subjective     Chief Complaint   Patient presents with    Follow-up       HPI   of several chronic conditions that are all mostly stable. Including type II diabetes, diabetic property, dyslipidemia, and PVD        HPI elements addressed above in the assessment and plan including problems, diagnosis, stability/instability,  improving/worsening, and chronicity will not be duplicated in this section. Any important additional HPI topics will be discussed here if needed.    Active Ambulatory Problems     Diagnosis Date Noted    Hyperlipidemia 03/24/2014    Type 2 diabetes mellitus without complication 07/15/2016    Diabetic polyneuropathy associated with type 2 diabetes mellitus 01/30/2018    Heloma molle - Right Foot 09/11/2018    Hammer toe of right foot - Right Foot 09/11/2018    Pain in toe of right foot - Right Foot 09/11/2018    Type 2 diabetes mellitus with peripheral neuropathy 09/11/2018    PVD (peripheral vascular disease) 09/11/2018     Resolved Ambulatory Problems     Diagnosis Date Noted    Tobacco dependence 07/15/2016     Past Medical History:   Diagnosis Date    Diabetes 2001    Hyperlipidemia     Type 2 diabetes mellitus          Review of Systems  "  Constitutional: Positive for fatigue.   Eyes: Negative for visual disturbance.   Respiratory: Negative for shortness of breath.    Cardiovascular: Negative for chest pain and palpitations.   Gastrointestinal: Negative.    Endocrine: Negative for polydipsia and polyuria.   Musculoskeletal: Positive for arthralgias.   Skin: Negative for pallor.   Neurological: Positive for numbness. Negative for dizziness, tremors, seizures, speech difficulty, weakness and headaches.   Psychiatric/Behavioral: Negative for confusion and dysphoric mood. The patient is not nervous/anxious.        Objective     Physical Exam   Constitutional: He is oriented to person, place, and time. He appears well-developed and well-nourished. No distress.   HENT:   Head: Normocephalic and atraumatic.   Eyes: No scleral icterus.   Neck: Normal range of motion. Neck supple.   Cardiovascular: Normal rate, regular rhythm and normal heart sounds. Exam reveals no gallop.   No murmur heard.  Pulmonary/Chest: Effort normal and breath sounds normal. No stridor. No respiratory distress. He has no wheezes.   Neurological: He is alert and oriented to person, place, and time. He has normal reflexes.   Skin: Skin is warm and dry. He is not diaphoretic.   Psychiatric: He has a normal mood and affect. His behavior is normal.   Vitals reviewed.    Vitals:    09/19/18 1415   BP: 126/78   BP Location: Left arm   Patient Position: Sitting   BP Method: Medium (Manual)   Pulse: 92   Temp: 98.8 °F (37.1 °C)   TempSrc: Oral   Weight: 77.3 kg (170 lb 4.9 oz)   Height: 5' 8" (1.727 m)       MOST RECENT LABS IN OUR ELECTRONIC MEDICAL RECORD:     Results for orders placed or performed in visit on 06/22/18   Comprehensive metabolic panel   Result Value Ref Range    Sodium 141 136 - 145 mmol/L    Potassium 4.3 3.5 - 5.1 mmol/L    Chloride 107 95 - 110 mmol/L    CO2 22 (L) 23 - 29 mmol/L    Glucose 144 (H) 70 - 110 mg/dL    BUN, Bld 24 (H) 8 - 23 mg/dL    Creatinine 1.4 0.5 - 1.4 " mg/dL    Calcium 9.7 8.7 - 10.5 mg/dL    Total Protein 7.2 6.0 - 8.4 g/dL    Albumin 4.1 3.5 - 5.2 g/dL    Total Bilirubin 0.7 0.1 - 1.0 mg/dL    Alkaline Phosphatase 54 (L) 55 - 135 U/L    AST 17 10 - 40 U/L    ALT 19 10 - 44 U/L    Anion Gap 12 8 - 16 mmol/L    eGFR if African American 54.9 (A) >60 mL/min/1.73 m^2    eGFR if non  47.4 (A) >60 mL/min/1.73 m^2   Lipid panel   Result Value Ref Range    Cholesterol 148 120 - 199 mg/dL    Triglycerides 102 30 - 150 mg/dL    HDL 36 (L) 40 - 75 mg/dL    LDL Cholesterol 91.6 63.0 - 159.0 mg/dL    HDL/Chol Ratio 24.3 20.0 - 50.0 %    Total Cholesterol/HDL Ratio 4.1 2.0 - 5.0    Non-HDL Cholesterol 112 mg/dL   Hemoglobin A1c   Result Value Ref Range    Hemoglobin A1C 6.3 (H) 4.0 - 5.6 %    Estimated Avg Glucose 134 (H) 68 - 131 mg/dL

## 2018-09-25 ENCOUNTER — OFFICE VISIT (OUTPATIENT)
Dept: FAMILY MEDICINE | Facility: CLINIC | Age: 78
End: 2018-09-25
Payer: MEDICARE

## 2018-09-25 ENCOUNTER — TELEPHONE (OUTPATIENT)
Dept: FAMILY MEDICINE | Facility: CLINIC | Age: 78
End: 2018-09-25

## 2018-09-25 ENCOUNTER — NURSE TRIAGE (OUTPATIENT)
Dept: ADMINISTRATIVE | Facility: CLINIC | Age: 78
End: 2018-09-25

## 2018-09-25 ENCOUNTER — HOSPITAL ENCOUNTER (OUTPATIENT)
Dept: RADIOLOGY | Facility: HOSPITAL | Age: 78
Discharge: HOME | End: 2018-09-25
Attending: NURSE PRACTITIONER
Payer: MEDICARE

## 2018-09-25 VITALS
DIASTOLIC BLOOD PRESSURE: 78 MMHG | SYSTOLIC BLOOD PRESSURE: 152 MMHG | TEMPERATURE: 99 F | HEIGHT: 68 IN | BODY MASS INDEX: 25.76 KG/M2 | OXYGEN SATURATION: 95 % | WEIGHT: 170 LBS | HEART RATE: 93 BPM

## 2018-09-25 DIAGNOSIS — E11.9 TYPE 2 DIABETES MELLITUS WITHOUT COMPLICATION, WITHOUT LONG-TERM CURRENT USE OF INSULIN: ICD-10-CM

## 2018-09-25 DIAGNOSIS — R06.02 SOB (SHORTNESS OF BREATH): ICD-10-CM

## 2018-09-25 DIAGNOSIS — R42 DIZZINESS: Primary | ICD-10-CM

## 2018-09-25 DIAGNOSIS — R94.31 ABNORMAL EKG: ICD-10-CM

## 2018-09-25 DIAGNOSIS — E11.42 TYPE 2 DIABETES MELLITUS WITH PERIPHERAL NEUROPATHY: ICD-10-CM

## 2018-09-25 DIAGNOSIS — R03.0 ELEVATED BLOOD-PRESSURE READING WITHOUT DIAGNOSIS OF HYPERTENSION: ICD-10-CM

## 2018-09-25 DIAGNOSIS — E87.1 HYPONATREMIA: ICD-10-CM

## 2018-09-25 LAB
BILIRUB UR QL STRIP: NEGATIVE
CLARITY UR: CLEAR
COLOR UR: YELLOW
GLUCOSE UR QL STRIP: NEGATIVE
HGB UR QL STRIP: ABNORMAL
KETONES UR QL STRIP: NEGATIVE
LEUKOCYTE ESTERASE UR QL STRIP: NEGATIVE
NITRITE UR QL STRIP: NEGATIVE
PH UR STRIP: 6 [PH] (ref 5–8)
PROT UR QL STRIP: NEGATIVE
SP GR UR STRIP: 1.01 (ref 1–1.03)
URN SPEC COLLECT METH UR: ABNORMAL

## 2018-09-25 PROCEDURE — 99999 PR PBB SHADOW E&M-EST. PATIENT-LVL IV: CPT | Mod: PBBFAC,,, | Performed by: NURSE PRACTITIONER

## 2018-09-25 PROCEDURE — 1101F PT FALLS ASSESS-DOCD LE1/YR: CPT | Mod: ,,, | Performed by: NURSE PRACTITIONER

## 2018-09-25 PROCEDURE — 93005 ELECTROCARDIOGRAM TRACING: CPT | Mod: PBBFAC,PO | Performed by: INTERNAL MEDICINE

## 2018-09-25 PROCEDURE — 81003 URINALYSIS AUTO W/O SCOPE: CPT | Mod: PO

## 2018-09-25 PROCEDURE — 99214 OFFICE O/P EST MOD 30 MIN: CPT | Mod: S$PBB,,, | Performed by: NURSE PRACTITIONER

## 2018-09-25 PROCEDURE — 71046 X-RAY EXAM CHEST 2 VIEWS: CPT | Mod: TC,FY,PO

## 2018-09-25 PROCEDURE — 93010 ELECTROCARDIOGRAM REPORT: CPT | Mod: S$PBB,,, | Performed by: INTERNAL MEDICINE

## 2018-09-25 PROCEDURE — 99214 OFFICE O/P EST MOD 30 MIN: CPT | Mod: PBBFAC,25,PO | Performed by: NURSE PRACTITIONER

## 2018-09-25 PROCEDURE — 71046 X-RAY EXAM CHEST 2 VIEWS: CPT | Mod: 26,,, | Performed by: RADIOLOGY

## 2018-09-25 NOTE — TELEPHONE ENCOUNTER
Spoke with pt's wife and notified her of what Dr. West recommended.  she stated he is still very dizzy and he just ate. She is going to wait 30 min and retake his blood pressure . If Bp is still high she will take him the [facility] ED and if not she will call back and make an appointment.

## 2018-09-25 NOTE — TELEPHONE ENCOUNTER
----- Message from Sabiha Robb sent at 9/25/2018 10:28 AM CDT -----  Type: Needs Medical Advice    Who Called:  Wife (Karrie)  Best Call Back Number: [phone]  Additional Information: Wife stated patient is experiencing high blood pressure with dizziness and not feeling well/gave call to screening nurse (Brandy)

## 2018-09-25 NOTE — TELEPHONE ENCOUNTER
Spoke with pt's wife and she states pt mentioned he had woken up with dizziness and eventually went away.  Then states dizziness returned while pt was outside.  BP reading are currently 170s/90s.      Denies SOB, CP, nausea, or blurred vision.      States HR is 97    Pt was referred to on call nurse previously and they wanted PCP to be advised.  Please send any recommendations.

## 2018-09-25 NOTE — TELEPHONE ENCOUNTER
Reason for Disposition   Taking a medicine that could cause dizziness (e.g., blood pressure medications, diuretics)    Protocols used: ST DIZZINESS-A-OH    Pt wife reports intermittent dizziness that started this AM. BP this am was 170s/90s. Care advice given, please call pt to advise per protocol.

## 2018-09-25 NOTE — PATIENT INSTRUCTIONS

## 2018-09-25 NOTE — PROGRESS NOTES
"Subjective:       Patient ID: Cecilio Cespedes is a 78 y.o. male.    Chief Complaint: Dizziness (177/92- "feels like everything in a fog")    Patient who is new to me presents with c/o "spinning in the head." The dizziness occurred 3 times this AM. Also felt SOB for 5 minutes. He felt like he was in a fog and at the time had blurred vision. He did not check his sugar. It has since resolved.       Dizziness:   Chronicity:  New  Onset:  Today  Progression since onset:  Unchanged  Severity:  Mild  Dizziness characteristics:  Spinning inside head only and off-balance   Associated symptoms: weakness and light-headedness.no hearing loss, no ear congestion, no ear pain, no fever, no headaches, no tinnitus, no nausea, no vomiting, no aural fullness, no visual disturbances, no syncope, no facial weakness, no slurred speech, no numbness in extremities and no chest pain.  Aggravated by:  Nothing    Review of Systems   Constitutional: Negative for chills, fatigue and fever.   HENT: Negative for congestion, ear pain, hearing loss, sinus pressure, sore throat and tinnitus.    Eyes: Positive for visual disturbance.   Respiratory: Positive for shortness of breath. Negative for wheezing.    Cardiovascular: Negative for chest pain, leg swelling and syncope.   Gastrointestinal: Negative for abdominal distention, abdominal pain, nausea and vomiting.   Genitourinary: Negative for dysuria and flank pain.   Skin: Negative for color change and pallor.   Neurological: Positive for dizziness, weakness and light-headedness. Negative for facial asymmetry, numbness and headaches.       Objective:      Physical Exam   Constitutional: He is oriented to person, place, and time. He appears well-developed and well-nourished.   HENT:   Head: Normocephalic and atraumatic.   Right Ear: External ear normal.   Left Ear: External ear normal.   Nose: Nose normal.   Mouth/Throat: Oropharynx is clear and moist.   Eyes: Pupils are equal, round, and reactive " to light.   Neck: Normal range of motion.   Cardiovascular: Normal rate, regular rhythm, normal heart sounds and intact distal pulses.   Pulmonary/Chest: Effort normal and breath sounds normal.   Abdominal: Soft. Bowel sounds are normal.   Musculoskeletal: Normal range of motion.   Neurological: He is alert and oriented to person, place, and time. He has normal strength. No cranial nerve deficit or sensory deficit. He displays a negative Romberg sign. Coordination and gait normal.   No focal deficits on neuro exam   Skin: Skin is warm and dry.   Nursing note and vitals reviewed.      Assessment:       1. Dizziness    2. Elevated blood-pressure reading without diagnosis of hypertension    3. SOB (shortness of breath)    4. Type 2 diabetes mellitus with peripheral neuropathy    5. Abnormal EKG    6. Type 2 diabetes mellitus without complication, without long-term current use of insulin    7. Hyponatremia        Plan:       Dizziness  -     CBC auto differential; Future; Expected date: 09/25/2018  -     Basic metabolic panel; Future; Expected date: 09/25/2018  -     TSH; Future; Expected date: 09/25/2018  -     URINALYSIS  -     Ambulatory referral to Cardiology  -     IN OFFICE EKG 12-LEAD (to Muse)    Elevated blood-pressure reading without diagnosis of hypertension  -     CBC auto differential; Future; Expected date: 09/25/2018  -     Basic metabolic panel; Future; Expected date: 09/25/2018  -     TSH; Future; Expected date: 09/25/2018  -     Ambulatory referral to Cardiology    SOB (shortness of breath)  -     CBC auto differential; Future; Expected date: 09/25/2018  -     Basic metabolic panel; Future; Expected date: 09/25/2018  -     X-Ray Chest PA And Lateral; Future; Expected date: 09/25/2018  -     Ambulatory referral to Cardiology    Type 2 diabetes mellitus with peripheral neuropathy  -     CBC auto differential; Future; Expected date: 09/25/2018  -     Basic metabolic panel; Future; Expected date:  09/25/2018    Abnormal EKG  -     CBC auto differential; Future; Expected date: 09/25/2018  -     Basic metabolic panel; Future; Expected date: 09/25/2018  -     Ambulatory referral to Cardiology    Type 2 diabetes mellitus without complication, without long-term current use of insulin  -     Basic metabolic panel; Future; Expected date: 09/25/2018    Hyponatremia  -     Basic metabolic panel; Future; Expected date: 10/10/2018      EKG shows NSR with possible right bundle branch block. Discussed with patient unable to rule out cardiac etiology. Discussed at length that if symptoms worsen he needs to be evaluated by ED. Patient to follow up with PCP in a few days. Will have patient record BP log, and give to PCP.  Labs noted for hyponatremia, possible cause of dizziness. Will have patient fluid restrict and repeat labs. Patient to follow up with any new or concerning symptoms.

## 2018-09-25 NOTE — TELEPHONE ENCOUNTER
Noted. Although I can really make no assessment on to how urgent this is based on what has been reported.     If the patient is not feeling well, continuing to have unexplained dizziness with elevated blood pressure he needs to go to the emergency room. If he feels better and blood pressure is reasonable then we can follow him up in clinic.

## 2018-09-26 ENCOUNTER — PATIENT MESSAGE (OUTPATIENT)
Dept: FAMILY MEDICINE | Facility: CLINIC | Age: 78
End: 2018-09-26

## 2018-09-26 NOTE — PROGRESS NOTES
Please call the patient and let him know that his chest xray is normal and his urine is within an acceptable range. Thanks.

## 2018-10-15 ENCOUNTER — TELEPHONE (OUTPATIENT)
Dept: CARDIOLOGY | Facility: CLINIC | Age: 78
End: 2018-10-15

## 2018-10-15 ENCOUNTER — OFFICE VISIT (OUTPATIENT)
Dept: CARDIOLOGY | Facility: CLINIC | Age: 78
End: 2018-10-15
Payer: MEDICARE

## 2018-10-15 VITALS
HEART RATE: 98 BPM | WEIGHT: 168.19 LBS | SYSTOLIC BLOOD PRESSURE: 140 MMHG | BODY MASS INDEX: 24.91 KG/M2 | HEIGHT: 69 IN | DIASTOLIC BLOOD PRESSURE: 83 MMHG

## 2018-10-15 DIAGNOSIS — R06.02 SOB (SHORTNESS OF BREATH) ON EXERTION: Primary | ICD-10-CM

## 2018-10-15 DIAGNOSIS — E11.9 TYPE 2 DIABETES MELLITUS WITHOUT COMPLICATION, WITHOUT LONG-TERM CURRENT USE OF INSULIN: ICD-10-CM

## 2018-10-15 DIAGNOSIS — E78.5 HYPERLIPIDEMIA, UNSPECIFIED HYPERLIPIDEMIA TYPE: ICD-10-CM

## 2018-10-15 DIAGNOSIS — R42 DIZZINESS: Primary | ICD-10-CM

## 2018-10-15 PROCEDURE — 99999 PR PBB SHADOW E&M-EST. PATIENT-LVL III: CPT | Mod: PBBFAC,,, | Performed by: INTERNAL MEDICINE

## 2018-10-15 PROCEDURE — 99204 OFFICE O/P NEW MOD 45 MIN: CPT | Mod: S$PBB,,, | Performed by: INTERNAL MEDICINE

## 2018-10-15 PROCEDURE — 99213 OFFICE O/P EST LOW 20 MIN: CPT | Mod: PBBFAC,PO | Performed by: INTERNAL MEDICINE

## 2018-10-15 PROCEDURE — 1101F PT FALLS ASSESS-DOCD LE1/YR: CPT | Mod: ,,, | Performed by: INTERNAL MEDICINE

## 2018-10-15 NOTE — PROGRESS NOTES
Subjective:    Patient ID:  Cecilio Cespedes is a 78 y.o. male who presents for evaluation of SOB    HPI  He comes with shortness of breath with moderate activity for the last few months, no chest pain.  Not interested in stress test at this time    Review of Systems   Constitution: Negative for decreased appetite, weakness, malaise/fatigue, weight gain and weight loss.   Cardiovascular: Negative for chest pain, dyspnea on exertion, leg swelling, palpitations and syncope.   Respiratory: Negative for cough and shortness of breath.    Gastrointestinal: Negative.    All other systems reviewed and are negative.       Objective:      Physical Exam   Constitutional: He is oriented to person, place, and time. He appears well-developed and well-nourished.   HENT:   Head: Normocephalic.   Eyes: Pupils are equal, round, and reactive to light.   Neck: Normal range of motion. Neck supple. No JVD present. Carotid bruit is not present. No thyromegaly present.   Cardiovascular: Normal rate, regular rhythm, normal heart sounds, intact distal pulses and normal pulses. PMI is not displaced. Exam reveals no gallop.   No murmur heard.  Pulmonary/Chest: Effort normal and breath sounds normal.   Abdominal: Soft. Normal appearance. He exhibits no mass. There is no hepatosplenomegaly. There is no tenderness.   Musculoskeletal: Normal range of motion. He exhibits no edema.   Neurological: He is alert and oriented to person, place, and time. He has normal strength and normal reflexes. No sensory deficit.   Skin: Skin is warm and intact.   Psychiatric: He has a normal mood and affect.   Nursing note and vitals reviewed.        Assessment:       1. SOB (shortness of breath) on exertion    2. Hyperlipidemia, unspecified hyperlipidemia type    3. Type 2 diabetes mellitus without complication, without long-term current use of insulin         Plan:     Echocardiogram  Call with results

## 2018-10-15 NOTE — LETTER
October 15, 2018      Niyah Ohara, JOSEPH  [address] [facility] [address] [place] [address]           [place] - [facility]  [address] [facility] [address] [place] [address]  Phone: [phone]          Patient: Cecilio Cespedes   MR Number: [id number]   YOB: 1940   Date of Visit: 10/15/2018       Dear Niyah Ohara:    Thank you for referring Cecilio Cespedes to me for evaluation. Attached you will find relevant portions of my assessment and plan of care.    If you have questions, please do not hesitate to call me. I look forward to following Cecilio Cespedes along with you.    Sincerely,    Juan Uriarte MD    Enclosure  CC:  No Recipients    If you would like to receive this communication electronically, please contact [email or web address] or [phone] to request more information on [facility] access.    For providers and/or their staff who would like to refer a patient to [facility], please contact us through our one-stop-shop provider referral line, [facility], at [phone].    If you feel you have received this communication in error or would no longer like to receive these types of communications, please e-mail [email or web address]

## 2018-10-22 ENCOUNTER — CLINICAL SUPPORT (OUTPATIENT)
Dept: CARDIOLOGY | Facility: CLINIC | Age: 78
End: 2018-10-22
Attending: INTERNAL MEDICINE
Payer: MEDICARE

## 2018-10-22 VITALS
SYSTOLIC BLOOD PRESSURE: 140 MMHG | WEIGHT: 168 LBS | BODY MASS INDEX: 25.46 KG/M2 | HEART RATE: 60 BPM | HEIGHT: 68 IN | DIASTOLIC BLOOD PRESSURE: 75 MMHG

## 2018-10-22 DIAGNOSIS — R06.02 SOB (SHORTNESS OF BREATH) ON EXERTION: ICD-10-CM

## 2018-10-22 PROCEDURE — 99212 OFFICE O/P EST SF 10 MIN: CPT | Mod: PBBFAC,PO

## 2018-10-22 PROCEDURE — 99999 PR PBB SHADOW E&M-EST. PATIENT-LVL II: CPT | Mod: PBBFAC,,,

## 2018-10-22 PROCEDURE — 93306 TTE W/DOPPLER COMPLETE: CPT | Mod: PBBFAC,PO | Performed by: INTERNAL MEDICINE

## 2018-10-24 LAB
ASCENDING AORTA: 3.15 CM
AV MEAN GRADIENT: 2.94 MMHG
AV PEAK GRADIENT: 4.76 MMHG
AV VALVE AREA: 2.72 CM2
BSA FOR ECHO PROCEDURE: 1.91 M2
CV ECHO LV RWT: 0.37 CM
DOP CALC AO PEAK VEL: 1.09 M/S
DOP CALC AO VTI: 17.87 CM
DOP CALC LVOT AREA: 4.08 CM2
DOP CALC LVOT DIAMETER: 2.28 CM
DOP CALC LVOT STROKE VOLUME: 48.56 CM3
DOP CALCLVOT PEAK VEL VTI: 11.9 CM
E WAVE DECELERATION TIME: 161.4 MSEC
E/A RATIO: 0.65
E/E' RATIO: 13.75
ECHO LV POSTERIOR WALL: 0.77 CM (ref 0.6–1.1)
FRACTIONAL SHORTENING: 27 % (ref 28–44)
INTERVENTRICULAR SEPTUM: 0.88 CM (ref 0.6–1.1)
IVRT: 0.08 MSEC
LA MAJOR: 3.57 CM
LA MINOR: 4.03 CM
LA WIDTH: 2.98 CM
LEFT ATRIUM SIZE: 2.89 CM
LEFT ATRIUM VOLUME INDEX: 14.5 ML/M2
LEFT ATRIUM VOLUME: 27.72 CM3
LEFT INTERNAL DIMENSION IN SYSTOLE: 3.21 CM (ref 2.1–4)
LEFT VENTRICLE DIASTOLIC VOLUME INDEX: 46.38 ML/M2
LEFT VENTRICLE DIASTOLIC VOLUME: 88.58 ML
LEFT VENTRICLE MASS INDEX: 60.1 G/M2
LEFT VENTRICLE SYSTOLIC VOLUME INDEX: 21.7 ML/M2
LEFT VENTRICLE SYSTOLIC VOLUME: 41.4 ML
LEFT VENTRICULAR INTERNAL DIMENSION IN DIASTOLE: 4.42 CM (ref 3.5–6)
LEFT VENTRICULAR MASS: 114.83 G
LV LATERAL E/E' RATIO: 13.75
LV SEPTAL E/E' RATIO: 13.75
MV PEAK A VEL: 0.85 M/S
MV PEAK E VEL: 0.55 M/S
MV STENOSIS PRESSURE HALF TIME: 46.81 MS
MV VALVE AREA P 1/2 METHOD: 4.7 CM2
PISA TR MAX VEL: 2.29 M/S
PULM VEIN S/D RATIO: 2.18
PV PEAK D VEL: 0.34 M/S
PV PEAK S VEL: 0.74 M/S
RA MAJOR: 3.55 CM
RA PRESSURE: 3 MMHG
RA WIDTH: 2.98 CM
RETIRED EF AND QEF - SEE NOTES: 53.26 %
RIGHT VENTRICULAR END-DIASTOLIC DIMENSION: 3.42 CM
SINUS: 3.45 CM
STJ: 3.52 CM
TDI LATERAL: 0.04
TDI SEPTAL: 0.04
TDI: 0.04
TR MAX PG: 20.98 MMHG
TRICUSPID ANNULAR PLANE SYSTOLIC EXCURSION: 2.46 CM
TV REST PULMONARY ARTERY PRESSURE: 23.98 MMHG

## 2018-10-31 ENCOUNTER — OFFICE VISIT (OUTPATIENT)
Dept: FAMILY MEDICINE | Facility: CLINIC | Age: 78
End: 2018-10-31
Payer: MEDICARE

## 2018-10-31 VITALS
HEART RATE: 86 BPM | RESPIRATION RATE: 18 BRPM | SYSTOLIC BLOOD PRESSURE: 138 MMHG | HEIGHT: 68 IN | BODY MASS INDEX: 25.62 KG/M2 | DIASTOLIC BLOOD PRESSURE: 72 MMHG | OXYGEN SATURATION: 97 % | WEIGHT: 169.06 LBS | TEMPERATURE: 99 F

## 2018-10-31 DIAGNOSIS — E11.42 TYPE 2 DIABETES MELLITUS WITH PERIPHERAL NEUROPATHY: ICD-10-CM

## 2018-10-31 DIAGNOSIS — R06.02 SOB (SHORTNESS OF BREATH) ON EXERTION: Primary | ICD-10-CM

## 2018-10-31 DIAGNOSIS — R03.0 ELEVATED BLOOD-PRESSURE READING WITHOUT DIAGNOSIS OF HYPERTENSION: ICD-10-CM

## 2018-10-31 PROCEDURE — 99999 PR PBB SHADOW E&M-EST. PATIENT-LVL III: CPT | Mod: PBBFAC,,, | Performed by: FAMILY MEDICINE

## 2018-10-31 PROCEDURE — 1101F PT FALLS ASSESS-DOCD LE1/YR: CPT | Mod: ,,, | Performed by: FAMILY MEDICINE

## 2018-10-31 PROCEDURE — 99214 OFFICE O/P EST MOD 30 MIN: CPT | Mod: S$PBB,,, | Performed by: FAMILY MEDICINE

## 2018-10-31 PROCEDURE — 99213 OFFICE O/P EST LOW 20 MIN: CPT | Mod: PBBFAC,PN | Performed by: FAMILY MEDICINE

## 2018-10-31 NOTE — PROGRESS NOTES
THIS DOCUMENT WAS MADE IN PART WITH VOICE RECOGNITION SOFTWARE.  OCCASIONALLY THIS SOFTWARE WILL MISINTERPRET WORDS OR PHRASES.      Cecilio COUGHLIN Coy  1940    Cecilio COUGHLIN was seen today for follow-up.    Diagnoses and all orders for this visit:    SOB (shortness of breath) on exertion  He reports improved no unstable symptoms. He will monitor and report any change he assures me  Elevated blood-pressure reading without diagnosis of hypertension  Stable, reasonable range of BP, will monitor  Type 2 diabetes mellitus with peripheral neuropathy  This chronic condition is currently stable.         Subjective     Chief Complaint   Patient presents with    Follow-up     diabetes        HPI  Returns for follow-up but in between labs.  His wife has been sick and he had to cancel few appointments.  So he does not have his routine 6 month diabetic labs done in advance but he did have some labs done a month ago that we did review just did not include an A1c.    Was having shortness of breath, better now.  Still has mild dyspnea with exertion.  He did consult with Cardiology but declined any stress testing.  Denies angina.  He thinks a lot of this is related to stress    Stress, wife ill., he says she has severe CHF and significant shortness of breath and this has been very stressful.  We discussed possible counseling or even medications help with stress but he declines at this time.  He denies severe depression denies hopelessness denies SI    Elevated pressure readings stabilized, given situations is satisfactory.       HPI elements addressed above in the assessment and plan including problems, diagnosis, stability/instability,  improving/worsening, and chronicity will not be duplicated in this section. Any important additional HPI topics will be discussed here if needed.    Active Ambulatory Problems     Diagnosis Date Noted    Hyperlipidemia 03/24/2014    Type 2 diabetes mellitus without complication 07/15/2016     Diabetic polyneuropathy associated with type 2 diabetes mellitus 01/30/2018    Heloma molle - Right Foot 09/11/2018    Hammer toe of right foot - Right Foot 09/11/2018    Pain in toe of right foot - Right Foot 09/11/2018    Type 2 diabetes mellitus with peripheral neuropathy 09/11/2018    PVD (peripheral vascular disease) 09/11/2018    SOB (shortness of breath) on exertion 10/15/2018     Resolved Ambulatory Problems     Diagnosis Date Noted    Tobacco dependence 07/15/2016     Past Medical History:   Diagnosis Date    Diabetes 2001    Hyperlipidemia     Type 2 diabetes mellitus        Review of Systems   HENT: Negative for trouble swallowing.    Respiratory: Positive for shortness of breath. Negative for chest tightness and wheezing.    Cardiovascular: Negative for chest pain and leg swelling.   Gastrointestinal: Negative.    Musculoskeletal: Positive for arthralgias.   Neurological: Positive for numbness.   Psychiatric/Behavioral: Positive for dysphoric mood. Negative for suicidal ideas. The patient is nervous/anxious.        Objective     Physical Exam   Constitutional: He is oriented to person, place, and time. He appears well-developed and well-nourished. No distress.   HENT:   Head: Normocephalic and atraumatic.   Eyes: No scleral icterus.   Neck: Normal range of motion. Neck supple.   Cardiovascular: Normal rate, regular rhythm and normal heart sounds. Exam reveals no gallop.   No murmur heard.  Pulmonary/Chest: Effort normal and breath sounds normal. No respiratory distress.   Neurological: He is alert and oriented to person, place, and time. He has normal reflexes.   Skin: Skin is warm and dry. He is not diaphoretic.   Psychiatric: He has a normal mood and affect. His behavior is normal.   Vitals reviewed.    Vitals:    10/31/18 0942   BP: 138/72   BP Location: Left arm   Patient Position: Sitting   BP Method: Large (Manual)   Pulse: 86   Resp: 18   Temp: 98.6 °F (37 °C)   TempSrc: Oral   SpO2: 97%  "  Weight: 76.7 kg (169 lb 1.5 oz)   Height: 5' 8" (1.727 m)       MOST RECENT LABS IN OUR ELECTRONIC MEDICAL RECORD:     Results for orders placed or performed in visit on 10/22/18   Transthoracic echo (TTE) complete   Result Value Ref Range    BSA 1.91 m2    TDI SEPTAL 0.04     LV LATERAL E/E' RATIO 13.75     LV SEPTAL E/E' RATIO 13.75     LA WIDTH 2.98 cm    TDI LATERAL 0.04     LVIDD 4.42 3.5 - 6.0 cm    IVS 0.88 0.6 - 1.1 cm    PW 0.77 0.6 - 1.1 cm    LVIDS 3.21 2.1 - 4.0 cm    FS 27 28 - 44 %    Calculated EF 53.26 %    LA volume 27.72 cm3    Sinus 3.45 cm    STJ 3.52 cm    Ascending aorta 3.15 cm    LV mass 114.83 g    LA size 2.89 cm    RVDD 3.42 cm    Tricuspid annular plane systolic excursion 2.46 cm    Left Ventricle Relative Wall Thickness 0.37 cm    AV mean gradient 2.94 mmHg    AV valve area 2.72 cm2    MV valve area p 1/2 method 4.70 cm2    E/A ratio 0.65     TDI 0.04     E wave decelartion time 161.40 msec    IVRT 0.08 msec    Pulm vein S/D ratio 2.18     LVOT diameter 2.28 cm    LVOT area 4.08 cm2    LVOT peak VTI 11.90 cm    Ao peak kemar 1.09 m/s    Ao VTI 17.87 cm    LVOT stroke volume 48.56 cm3    AV peak gradient 4.76 mmHg    E/E' ratio 13.75     MV Peak E Kemar 0.55 m/s    TR Max Kemar 2.29 m/s    MV stenosis pressure 1/2 time 46.81 ms    MV Peak A Kemar 0.85 m/s    PV Peak S Kemar 0.74 m/s    PV Peak D Kemar 0.34 m/s    LV Systolic Volume 41.40 mL    LV Systolic Volume Index 21.7 mL/m2    LV Diastolic Volume 88.58 mL    LV Diastolic Volume Index 46.38 mL/m2    LA Volume Index 14.5 mL/m2    LV Mass Index 60.1 g/m2    Right Atrium Major Axis 3.55 cm    Left Atrium Minor Axis 4.03 cm    Left Atrium Major Axis 3.57 cm    Triscuspid Valve Regurgitation Peak Gradient 20.98 mmHg    Right Atrium Width 2.98 cm    Right Atrial Pressure (from IVC) 3.0 mmHg    TV rest pulmonary artery pressure 23.98 mmHg         "

## 2018-11-29 ENCOUNTER — LAB VISIT (OUTPATIENT)
Dept: LAB | Facility: HOSPITAL | Age: 78
End: 2018-11-29
Attending: FAMILY MEDICINE
Payer: MEDICARE

## 2018-11-29 DIAGNOSIS — E11.9 TYPE 2 DIABETES MELLITUS WITHOUT COMPLICATION, WITHOUT LONG-TERM CURRENT USE OF INSULIN: ICD-10-CM

## 2018-11-29 LAB
ALBUMIN SERPL BCP-MCNC: 3.9 G/DL
ALP SERPL-CCNC: 57 U/L
ALT SERPL W/O P-5'-P-CCNC: 20 U/L
ANION GAP SERPL CALC-SCNC: 10 MMOL/L
AST SERPL-CCNC: 17 U/L
BILIRUB SERPL-MCNC: 0.5 MG/DL
BUN SERPL-MCNC: 18 MG/DL
CALCIUM SERPL-MCNC: 9.7 MG/DL
CHLORIDE SERPL-SCNC: 105 MMOL/L
CHOLEST SERPL-MCNC: 157 MG/DL
CHOLEST/HDLC SERPL: 3.5 {RATIO}
CO2 SERPL-SCNC: 24 MMOL/L
CREAT SERPL-MCNC: 1.2 MG/DL
EST. GFR  (AFRICAN AMERICAN): >60 ML/MIN/1.73 M^2
EST. GFR  (NON AFRICAN AMERICAN): 57.2 ML/MIN/1.73 M^2
ESTIMATED AVG GLUCOSE: 128 MG/DL
GLUCOSE SERPL-MCNC: 132 MG/DL
HBA1C MFR BLD HPLC: 6.1 %
HDLC SERPL-MCNC: 45 MG/DL
HDLC SERPL: 28.7 %
LDLC SERPL CALC-MCNC: 92.8 MG/DL
NONHDLC SERPL-MCNC: 112 MG/DL
POTASSIUM SERPL-SCNC: 4.5 MMOL/L
PROT SERPL-MCNC: 6.8 G/DL
SODIUM SERPL-SCNC: 139 MMOL/L
TRIGL SERPL-MCNC: 96 MG/DL

## 2018-11-29 PROCEDURE — 80061 LIPID PANEL: CPT

## 2018-11-29 PROCEDURE — 83036 HEMOGLOBIN GLYCOSYLATED A1C: CPT

## 2018-11-29 PROCEDURE — 36415 COLL VENOUS BLD VENIPUNCTURE: CPT | Mod: PN

## 2018-11-29 PROCEDURE — 80053 COMPREHEN METABOLIC PANEL: CPT

## 2018-12-12 ENCOUNTER — OFFICE VISIT (OUTPATIENT)
Dept: FAMILY MEDICINE | Facility: CLINIC | Age: 78
End: 2018-12-12
Payer: MEDICARE

## 2018-12-12 VITALS
DIASTOLIC BLOOD PRESSURE: 73 MMHG | SYSTOLIC BLOOD PRESSURE: 132 MMHG | OXYGEN SATURATION: 98 % | WEIGHT: 169.06 LBS | HEIGHT: 68 IN | BODY MASS INDEX: 25.62 KG/M2 | HEART RATE: 83 BPM

## 2018-12-12 DIAGNOSIS — I73.9 PVD (PERIPHERAL VASCULAR DISEASE): ICD-10-CM

## 2018-12-12 DIAGNOSIS — E78.5 HYPERLIPIDEMIA, UNSPECIFIED HYPERLIPIDEMIA TYPE: ICD-10-CM

## 2018-12-12 DIAGNOSIS — E11.9 TYPE 2 DIABETES MELLITUS WITHOUT COMPLICATION, WITHOUT LONG-TERM CURRENT USE OF INSULIN: ICD-10-CM

## 2018-12-12 DIAGNOSIS — N18.30 STAGE 3 CHRONIC KIDNEY DISEASE: ICD-10-CM

## 2018-12-12 DIAGNOSIS — E11.42 DIABETIC POLYNEUROPATHY ASSOCIATED WITH TYPE 2 DIABETES MELLITUS: ICD-10-CM

## 2018-12-12 DIAGNOSIS — Z00.00 ENCOUNTER FOR PREVENTIVE HEALTH EXAMINATION: Primary | ICD-10-CM

## 2018-12-12 DIAGNOSIS — E11.42 TYPE 2 DIABETES MELLITUS WITH PERIPHERAL NEUROPATHY: ICD-10-CM

## 2018-12-12 PROCEDURE — 99999 PR PBB SHADOW E&M-EST. PATIENT-LVL IV: CPT | Mod: PBBFAC,,, | Performed by: NURSE PRACTITIONER

## 2018-12-12 PROCEDURE — G0439 PPPS, SUBSEQ VISIT: HCPCS | Mod: S$GLB,,, | Performed by: NURSE PRACTITIONER

## 2018-12-12 NOTE — PATIENT INSTRUCTIONS
Counseling and Referral of Other Preventative  (Italic type indicates deductible and co-insurance are waived)    Patient Name: Cecilio Cespedes  Today's Date: 12/12/2018    Health Maintenance       Date Due Completion Date    Pneumococcal Vaccine (65+ Low/Medium Risk) (2 of 2 - PPSV23) 07/15/2017 7/15/2016    Influenza Vaccine 08/01/2018 10/19/2017    Hemoglobin A1c 05/29/2019 11/29/2018    Foot Exam 09/11/2019 9/11/2018    Eye Exam 11/20/2019 11/20/2018    Override on 11/17/2016: Done ([facility])    Override on 11/3/2015: Done    Lipid Panel 11/29/2019 11/29/2018    Urine Microalbumin 11/29/2019 11/29/2018    TETANUS VACCINE 06/12/2024 6/12/2014 (Done)    Override on 6/12/2014: Done (Completed at [facility] ER for cut - dsn't remember specific date)        No orders of the defined types were placed in this encounter.    The following information is provided to all patients.  This information is to help you find resources for any of the problems found today that may be affecting your health:                Living healthy guide: www.[facility].[place].gov      Understanding Diabetes: www.diabetes.org      Eating healthy: www.cdc.gov/healthyweight      CDC home safety checklist: www.cdc.gov/steadi/patient.html      Agency on Aging: www.goea.[place].[facility]      Alcoholics anonymous (AA): www.aa.org      Physical Activity: www.aj.nih.gov/cj5vbqu      Tobacco use: www.quitwithusla.org

## 2018-12-12 NOTE — PROGRESS NOTES
"Cecilio Cespedes presented for a  Medicare AWV and comprehensive Health Risk Assessment today. The following components were reviewed and updated:    · Medical history  · Family History  · Social history  · Allergies and Current Medications  · Health Risk Assessment  · Health Maintenance  · Care Team     ** See Completed Assessments for Annual Wellness Visit within the encounter summary.**     The following assessments were completed:  · Living Situation  · CAGE  · Depression Screening  · Timed Get Up and Go  · Whisper Test  · Cognitive Function Screening        · Nutrition Screening  · ADL Screening  · PAQ Screening    Vitals:    12/12/18 0904   BP: 132/73   BP Location: Left arm   Patient Position: Sitting   BP Method: Medium (Automatic)   Pulse: 83   SpO2: 98%   Weight: 76.7 kg (169 lb 1.5 oz)   Height: 5' 8" (1.727 m)     Body mass index is 25.71 kg/m².  Physical Exam   Constitutional: He is oriented to person, place, and time. He appears well-nourished.   Cardiovascular: Normal rate, regular rhythm, normal heart sounds and intact distal pulses.   Pulmonary/Chest: Effort normal and breath sounds normal.   Neurological: He is alert and oriented to person, place, and time.   Skin: Skin is warm and dry.   Vitals reviewed.        Diagnoses and health risks identified today and associated recommendations/orders:    1. Encounter for preventive health examination  Reviewed and discussed health maintenance.    Reports having influenza vaccine at [facility]  Declines update on pneumonia vaccine today    2. Diabetic polyneuropathy associated with type 2 diabetes mellitus  Stable- continue current treatment and follow up routinely with PCP     3. Hyperlipidemia, unspecified hyperlipidemia type  Stable- continue current treatment and follow up routinely with PCP     4. PVD (peripheral vascular disease)  Stable- continue current treatment and follow up routinely with PCP     5. Type 2 diabetes mellitus with " peripheral neuropathy  Stable- continue current treatment and follow up routinely with PCP     6. Type 2 diabetes mellitus without complication, without long-term current use of insulin  Stable- continue current treatment and follow up routinely with PCP     7. Stage 3 chronic kidney disease  Stable- continue current treatment and follow up routinely with PCP   Avoid NSAIDs and increase fluids    Provided Cecilio J with a 5-10 year written screening schedule and personal prevention plan. Recommendations were developed using the USPSTF age appropriate recommendations. Education, counseling, and referrals were provided as needed. After Visit Summary printed and given to patient which includes a list of additional screenings\tests needed.    Becca Lomax, JOSEPH

## 2019-01-07 ENCOUNTER — OFFICE VISIT (OUTPATIENT)
Dept: FAMILY MEDICINE | Facility: CLINIC | Age: 79
End: 2019-01-07
Payer: MEDICARE

## 2019-01-07 VITALS
TEMPERATURE: 98 F | WEIGHT: 167.44 LBS | HEART RATE: 84 BPM | BODY MASS INDEX: 25.38 KG/M2 | DIASTOLIC BLOOD PRESSURE: 64 MMHG | RESPIRATION RATE: 18 BRPM | HEIGHT: 68 IN | SYSTOLIC BLOOD PRESSURE: 128 MMHG

## 2019-01-07 DIAGNOSIS — E78.5 DYSLIPIDEMIA: ICD-10-CM

## 2019-01-07 DIAGNOSIS — E11.42 TYPE 2 DIABETES MELLITUS WITH PERIPHERAL NEUROPATHY: ICD-10-CM

## 2019-01-07 DIAGNOSIS — I73.9 PVD (PERIPHERAL VASCULAR DISEASE): ICD-10-CM

## 2019-01-07 DIAGNOSIS — E11.9 TYPE 2 DIABETES MELLITUS WITHOUT COMPLICATION, WITHOUT LONG-TERM CURRENT USE OF INSULIN: Primary | ICD-10-CM

## 2019-01-07 DIAGNOSIS — N18.30 STAGE 3 CHRONIC KIDNEY DISEASE: ICD-10-CM

## 2019-01-07 PROCEDURE — 1101F PT FALLS ASSESS-DOCD LE1/YR: CPT | Mod: S$GLB,,, | Performed by: FAMILY MEDICINE

## 2019-01-07 PROCEDURE — 99214 OFFICE O/P EST MOD 30 MIN: CPT | Mod: S$GLB,,, | Performed by: FAMILY MEDICINE

## 2019-01-07 PROCEDURE — 99999 PR PBB SHADOW E&M-EST. PATIENT-LVL III: CPT | Mod: PBBFAC,,, | Performed by: FAMILY MEDICINE

## 2019-01-07 PROCEDURE — 1101F PR PT FALLS ASSESS DOC 0-1 FALLS W/OUT INJ PAST YR: ICD-10-PCS | Mod: S$GLB,,, | Performed by: FAMILY MEDICINE

## 2019-01-07 PROCEDURE — 99214 PR OFFICE/OUTPT VISIT, EST, LEVL IV, 30-39 MIN: ICD-10-PCS | Mod: S$GLB,,, | Performed by: FAMILY MEDICINE

## 2019-01-07 PROCEDURE — 99999 PR PBB SHADOW E&M-EST. PATIENT-LVL III: ICD-10-PCS | Mod: PBBFAC,,, | Performed by: FAMILY MEDICINE

## 2019-01-07 NOTE — PROGRESS NOTES
THIS DOCUMENT WAS MADE IN PART WITH VOICE RECOGNITION SOFTWARE.  OCCASIONALLY THIS SOFTWARE WILL MISINTERPRET WORDS OR PHRASES.      Cecilio Napierichaux  1940    Cecilio COUGHLIN was seen today for diabetes.    Diagnoses and all orders for this visit:    Type 2 diabetes mellitus without complication, without long-term current use of insulin  -     Comprehensive metabolic panel; Future  -     Hemoglobin A1c; Future  -     Microalbumin/creatinine urine ratio; Future    Type 2 diabetes mellitus with peripheral neuropathy  Stable    PVD (peripheral vascular disease)  Stable no claudication though feet cold at times    Stage 3 chronic kidney disease  Stable    Dyslipidemia  -     Comprehensive metabolic panel; Future  -     Lipid panel; Future    Change in bowels, he will keep his appointment with Gastroenterology but will try if you changes in the meantime.  If there is worsening or new symptoms he should let me know right away  Add miralax daily  64 oz water daily  Wean down on Dulcolax, discussed concerns about stimulant laxatives but also he should not discontinue abruptly.  Keep metformin at 500 bid for now as diabetes is excellent.    Subjective     Chief Complaint   Patient presents with    Diabetes     follow up        HPI    Change in bowels, alternates b/n diarrhea  And constipation  Takes dulcolax about ~ 3 days, then diarrhea  No diet change.  No blood.  No narrow stool  Appt. With GI in feb.    He did reduce metformin (on his own), no difference.  The constipation is no worse.    Diabetes remains excellent and stable    Lipid profile is more satisfactory as his HDL has improved.    HPI elements addressed above in the assessment and plan including problems, diagnosis, stability/instability,  improving/worsening, and chronicity will not be duplicated in this section. Any important additional HPI topics will be discussed here if needed.    Active Ambulatory Problems     Diagnosis Date Noted    Hyperlipidemia  03/24/2014    Type 2 diabetes mellitus without complication 07/15/2016    Diabetic polyneuropathy associated with type 2 diabetes mellitus 01/30/2018    Heloma molle - Right Foot 09/11/2018    Hammer toe of right foot - Right Foot 09/11/2018    Pain in toe of right foot - Right Foot 09/11/2018    Type 2 diabetes mellitus with peripheral neuropathy 09/11/2018    PVD (peripheral vascular disease) 09/11/2018    SOB (shortness of breath) on exertion 10/15/2018    Stage 3 chronic kidney disease 12/12/2018     Resolved Ambulatory Problems     Diagnosis Date Noted    Tobacco dependence 07/15/2016     Past Medical History:   Diagnosis Date    Diabetes 2001    Hyperlipidemia     Stage 3 chronic kidney disease 12/12/2018    Type 2 diabetes mellitus        Review of Systems   HENT: Negative for trouble swallowing.    Respiratory: Positive for shortness of breath. Negative for chest tightness and wheezing.    Cardiovascular: Negative for chest pain and leg swelling.   Gastrointestinal: Positive for constipation. Negative for abdominal pain, blood in stool, nausea and vomiting.   Endocrine: Negative for polydipsia and polyuria.   Genitourinary: Positive for frequency (nocturia x 2-3). Negative for dysuria, flank pain and urgency.   Musculoskeletal: Positive for arthralgias.   Neurological: Positive for numbness.   Psychiatric/Behavioral: Positive for dysphoric mood (Stable). Negative for suicidal ideas.       Objective     Physical Exam   Constitutional: He is oriented to person, place, and time. He appears well-developed and well-nourished. No distress.   HENT:   Head: Normocephalic and atraumatic.   Eyes: Conjunctivae are normal. No scleral icterus.   Neck: Normal range of motion. Neck supple. No JVD present. No thyromegaly present.   Cardiovascular: Normal rate, regular rhythm and normal heart sounds. Exam reveals no gallop.   No murmur heard.  Pulmonary/Chest: Effort normal and breath sounds normal. No  "respiratory distress.   Abdominal:   Abdomen soft, nondistended.  Bowel sounds present though somewhat hypoactive.  No masses. No significant tenderness and certainly no localizing tenderness.  Bladder not distended and no guarding or rebound tenderness.   Neurological: He is alert and oriented to person, place, and time. He has normal reflexes.   Skin: Skin is warm and dry. He is not diaphoretic.   Psychiatric: He has a normal mood and affect. His behavior is normal.   Vitals reviewed.    Vitals:    01/07/19 1352   BP: 128/64   BP Location: Right arm   Patient Position: Sitting   BP Method: Large (Manual)   Pulse: 84   Resp: 18   Temp: 98.2 °F (36.8 °C)   TempSrc: Oral   Weight: 76 kg (167 lb 7 oz)   Height: 5' 8" (1.727 m)       MOST RECENT LABS IN OUR ELECTRONIC MEDICAL RECORD:     Results for orders placed or performed in visit on 11/29/18   Microalbumin/creatinine urine ratio   Result Value Ref Range    Microalbum.,U,Random 24.0 ug/mL    Creatinine, Random Ur 230.0 23.0 - 375.0 mg/dL    Microalb Creat Ratio 10.4 0.0 - 30.0 ug/mg         "

## 2019-02-05 ENCOUNTER — OFFICE VISIT (OUTPATIENT)
Dept: GASTROENTEROLOGY | Facility: CLINIC | Age: 79
End: 2019-02-05
Payer: MEDICARE

## 2019-02-05 VITALS — HEIGHT: 68 IN | BODY MASS INDEX: 25.61 KG/M2 | WEIGHT: 169 LBS

## 2019-02-05 DIAGNOSIS — Z86.010 HX OF COLONIC POLYPS: ICD-10-CM

## 2019-02-05 DIAGNOSIS — R19.4 CHANGE IN BOWEL HABITS: Primary | ICD-10-CM

## 2019-02-05 PROCEDURE — 99999 PR PBB SHADOW E&M-EST. PATIENT-LVL II: ICD-10-PCS | Mod: PBBFAC,,, | Performed by: INTERNAL MEDICINE

## 2019-02-05 PROCEDURE — 99203 PR OFFICE/OUTPT VISIT, NEW, LEVL III, 30-44 MIN: ICD-10-PCS | Mod: S$GLB,,, | Performed by: INTERNAL MEDICINE

## 2019-02-05 PROCEDURE — 1101F PT FALLS ASSESS-DOCD LE1/YR: CPT | Mod: S$GLB,,, | Performed by: INTERNAL MEDICINE

## 2019-02-05 PROCEDURE — 99203 OFFICE O/P NEW LOW 30 MIN: CPT | Mod: S$GLB,,, | Performed by: INTERNAL MEDICINE

## 2019-02-05 PROCEDURE — 99999 PR PBB SHADOW E&M-EST. PATIENT-LVL II: CPT | Mod: PBBFAC,,, | Performed by: INTERNAL MEDICINE

## 2019-02-05 PROCEDURE — 1101F PR PT FALLS ASSESS DOC 0-1 FALLS W/OUT INJ PAST YR: ICD-10-PCS | Mod: S$GLB,,, | Performed by: INTERNAL MEDICINE

## 2019-02-05 NOTE — PROGRESS NOTES
"Pt presents for evaluation "bowel problems". Pt describes alternating diarrhea and constipation past 4-5 months. No rectal bleeding. No abd pain. No N/V. Appetite and weight stable. Denies change in diet or lifestyle. No new medication. No foreign travel. No rashes. No fever or jaundice. Last C-scope 8 years ago, benign polyps. Recent CBC, LFT's, and TSH normal. Of note, pt acknowledges recent significant life stressor, wife had CABG approx 7 months ago.    REVIEW OF SYSTEMS:   Constitutional: Negative for fever, appetite change and unexpected weight change.  HENT: Negative for sore throat and trouble swallowing.  Eyes: Negative for visual disturbance.  Respiratory: Negative for chest tightness, shortness of breath and wheezing.  Cardiovascular: Negative for chest pain.  Gastrointestinal:  as per HPI  Genitourinary: Negative for dysuria, frequency and hematuria.  Musculoskeletal: Negative for myalgias, joint swelling and arthralgias.  Skin: Negative for color change and rash.   Neurological: Negative for syncope, weakness and headaches.    PHYSICAL EXAMINATION:                                                        GENERAL:  Comfortable, in no acute distress.      SKIN: Non-jaundiced.                             HEENT EXAM:  Nonicteric.  No adenopathy.  Oropharynx is clear.               NECK:  Supple.                                                               LUNGS:  Clear.                                                               CARDIAC:  Regular rate and rhythm.  S1, S2.  No murmur.                      ABDOMEN:  Soft, positive bowel sounds, nontender.  No hepatosplenomegaly or masses.  No rebound or guarding.                                             EXTREMITIES:  No edema.     NEURO: CN II-XII intact; motor/sensory non-focal.    IMP: 1. Change bowel habits          2. Hx colon polyps    PLAN: C-scope.  "

## 2019-02-13 ENCOUNTER — TELEPHONE (OUTPATIENT)
Dept: GASTROENTEROLOGY | Facility: CLINIC | Age: 79
End: 2019-02-13

## 2019-02-13 NOTE — TELEPHONE ENCOUNTER
----- Message from Parvin Goins sent at 2/13/2019  1:15 PM CST -----  Type:  Sooner Apoointment Request    Caller is requesting a sooner appointment.  Caller declined first available appointment listed below.  Caller will not accept being placed on the waitlist and is requesting a message be sent to doctor.    Name of Caller: wife- Karrie   When is the first available appointment? NA   Symptoms:  NA   Best Call Back Number: [phone]  Additional Information:  Patient's wife asking to cancel scheduled procedure for the patient.

## 2019-02-15 ENCOUNTER — TELEPHONE (OUTPATIENT)
Dept: AUDIOLOGY | Facility: CLINIC | Age: 79
End: 2019-02-15

## 2019-02-15 NOTE — TELEPHONE ENCOUNTER
Pt stated that his dizziness has improved and he declined an appt with ENT/Audiology for dizziness work-up.

## 2019-03-07 DIAGNOSIS — E78.5 DYSLIPIDEMIA: ICD-10-CM

## 2019-03-07 RX ORDER — PRAVASTATIN SODIUM 20 MG/1
20 TABLET ORAL DAILY
Qty: 90 TABLET | Refills: 3 | Status: SHIPPED | OUTPATIENT
Start: 2019-03-07 | End: 2020-04-27 | Stop reason: SDUPTHER

## 2019-03-07 NOTE — TELEPHONE ENCOUNTER
Pt Of [facility] MD    Last seen on: 1-7-19    Next appt: 3-19-19    Last refill: 1-30-18    Allergies:   Review of patient's allergies indicates:   Allergen Reactions    No known allergies        Pharmacy:   [facility]  [address] [address]  Phone: [phone] Fax: [phone]    Labs: Please review.      Please review! Thank you!

## 2019-03-07 NOTE — TELEPHONE ENCOUNTER
----- Message from Edvin Cedillo sent at 3/7/2019  9:01 AM CST -----  Type:  RX Refill Request    Who Called: Karrie Coy (Spouse)   Refill or New Rx:  Refill  RX Name and Strength:  pravastatin (PRAVACHOL) 20 MG tablet       How is the patient currently taking it? (ex. 1XDay):  1XDay  Is this a 30 day or 90 day RX:  90  Preferred Pharmacy with phone number:    [facility]  [address] [address]  Phone: [phone] Fax: [phone]    Local or Mail Order:  Mail Order  Ordering Provider:  Brett Veloz Call Back Number:  [phone]  Additional Information:

## 2019-03-12 ENCOUNTER — LAB VISIT (OUTPATIENT)
Dept: LAB | Facility: HOSPITAL | Age: 79
End: 2019-03-12
Attending: FAMILY MEDICINE
Payer: MEDICARE

## 2019-03-12 DIAGNOSIS — E78.5 DYSLIPIDEMIA: ICD-10-CM

## 2019-03-12 DIAGNOSIS — Z12.5 SPECIAL SCREENING FOR MALIGNANT NEOPLASM OF PROSTATE: ICD-10-CM

## 2019-03-12 DIAGNOSIS — E11.9 TYPE 2 DIABETES MELLITUS WITHOUT COMPLICATION, WITHOUT LONG-TERM CURRENT USE OF INSULIN: ICD-10-CM

## 2019-03-12 DIAGNOSIS — R39.9 LOWER URINARY TRACT SYMPTOMS (LUTS): ICD-10-CM

## 2019-03-12 LAB
ALBUMIN SERPL BCP-MCNC: 4 G/DL
ALP SERPL-CCNC: 59 U/L
ALT SERPL W/O P-5'-P-CCNC: 16 U/L
ANION GAP SERPL CALC-SCNC: 7 MMOL/L
AST SERPL-CCNC: 14 U/L
BILIRUB SERPL-MCNC: 0.4 MG/DL
BUN SERPL-MCNC: 23 MG/DL
CALCIUM SERPL-MCNC: 10 MG/DL
CHLORIDE SERPL-SCNC: 105 MMOL/L
CHOLEST SERPL-MCNC: 200 MG/DL
CHOLEST/HDLC SERPL: 4.1 {RATIO}
CO2 SERPL-SCNC: 30 MMOL/L
COMPLEXED PSA SERPL-MCNC: 1.3 NG/ML
CREAT SERPL-MCNC: 1.3 MG/DL
EST. GFR  (AFRICAN AMERICAN): 59.6 ML/MIN/1.73 M^2
EST. GFR  (NON AFRICAN AMERICAN): 51.5 ML/MIN/1.73 M^2
ESTIMATED AVG GLUCOSE: 140 MG/DL
GLUCOSE SERPL-MCNC: 121 MG/DL
HBA1C MFR BLD HPLC: 6.5 %
HDLC SERPL-MCNC: 49 MG/DL
HDLC SERPL: 24.5 %
LDLC SERPL CALC-MCNC: 133.2 MG/DL
NONHDLC SERPL-MCNC: 151 MG/DL
POTASSIUM SERPL-SCNC: 3.9 MMOL/L
PROT SERPL-MCNC: 7.1 G/DL
SODIUM SERPL-SCNC: 142 MMOL/L
TRIGL SERPL-MCNC: 89 MG/DL

## 2019-03-12 PROCEDURE — 84153 ASSAY OF PSA TOTAL: CPT

## 2019-03-12 PROCEDURE — 80053 COMPREHEN METABOLIC PANEL: CPT

## 2019-03-12 PROCEDURE — 80061 LIPID PANEL: CPT

## 2019-03-12 PROCEDURE — 83036 HEMOGLOBIN GLYCOSYLATED A1C: CPT

## 2019-03-12 PROCEDURE — 36415 COLL VENOUS BLD VENIPUNCTURE: CPT | Mod: PN

## 2019-03-14 ENCOUNTER — OFFICE VISIT (OUTPATIENT)
Dept: SPINE | Facility: CLINIC | Age: 79
End: 2019-03-14
Payer: MEDICARE

## 2019-03-14 VITALS
BODY MASS INDEX: 25.33 KG/M2 | DIASTOLIC BLOOD PRESSURE: 78 MMHG | WEIGHT: 167.13 LBS | SYSTOLIC BLOOD PRESSURE: 136 MMHG | HEART RATE: 86 BPM | HEIGHT: 68 IN

## 2019-03-14 DIAGNOSIS — M54.50 ACUTE BILATERAL LOW BACK PAIN WITHOUT SCIATICA: Primary | ICD-10-CM

## 2019-03-14 PROCEDURE — 99999 PR PBB SHADOW E&M-EST. PATIENT-LVL III: ICD-10-PCS | Mod: PBBFAC,,, | Performed by: PHYSICIAN ASSISTANT

## 2019-03-14 PROCEDURE — 99203 PR OFFICE/OUTPT VISIT, NEW, LEVL III, 30-44 MIN: ICD-10-PCS | Mod: S$GLB,,, | Performed by: PHYSICIAN ASSISTANT

## 2019-03-14 PROCEDURE — 99203 OFFICE O/P NEW LOW 30 MIN: CPT | Mod: S$GLB,,, | Performed by: PHYSICIAN ASSISTANT

## 2019-03-14 PROCEDURE — 1101F PT FALLS ASSESS-DOCD LE1/YR: CPT | Mod: S$GLB,,, | Performed by: PHYSICIAN ASSISTANT

## 2019-03-14 PROCEDURE — 1101F PR PT FALLS ASSESS DOC 0-1 FALLS W/OUT INJ PAST YR: ICD-10-PCS | Mod: S$GLB,,, | Performed by: PHYSICIAN ASSISTANT

## 2019-03-14 PROCEDURE — 99999 PR PBB SHADOW E&M-EST. PATIENT-LVL III: CPT | Mod: PBBFAC,,, | Performed by: PHYSICIAN ASSISTANT

## 2019-03-14 RX ORDER — TIZANIDINE 4 MG/1
4 TABLET ORAL EVERY 8 HOURS PRN
Qty: 40 TABLET | Refills: 0 | Status: SHIPPED | OUTPATIENT
Start: 2019-03-14 | End: 2019-03-14 | Stop reason: CLARIF

## 2019-03-14 RX ORDER — TIZANIDINE 4 MG/1
4 TABLET ORAL EVERY 8 HOURS PRN
Qty: 40 TABLET | Refills: 0 | Status: SHIPPED | OUTPATIENT
Start: 2019-03-14 | End: 2019-05-29

## 2019-03-18 ENCOUNTER — TELEPHONE (OUTPATIENT)
Dept: SPINE | Facility: CLINIC | Age: 79
End: 2019-03-18

## 2019-03-18 NOTE — TELEPHONE ENCOUNTER
Mary Cespedes would like to go to [facility] for PT. Please let me know once you have placed the orders and I will fax them over.

## 2019-03-18 NOTE — TELEPHONE ENCOUNTER
----- Message from Diana Hurtado sent at 3/18/2019 11:54 AM CDT -----    Pt  Wife warren  Is calling to  Speak to the  Nurse  About   Physical  Therapy// please call   The  Orders  Were  Never   Received // please call to  Discuss [phone]

## 2019-03-19 ENCOUNTER — OFFICE VISIT (OUTPATIENT)
Dept: FAMILY MEDICINE | Facility: CLINIC | Age: 79
End: 2019-03-19
Payer: MEDICARE

## 2019-03-19 ENCOUNTER — TELEPHONE (OUTPATIENT)
Dept: FAMILY MEDICINE | Facility: CLINIC | Age: 79
End: 2019-03-19

## 2019-03-19 VITALS
BODY MASS INDEX: 25.13 KG/M2 | HEIGHT: 68 IN | RESPIRATION RATE: 18 BRPM | DIASTOLIC BLOOD PRESSURE: 58 MMHG | SYSTOLIC BLOOD PRESSURE: 106 MMHG | WEIGHT: 165.81 LBS | TEMPERATURE: 98 F | HEART RATE: 74 BPM

## 2019-03-19 DIAGNOSIS — M54.50 BILATERAL LOW BACK PAIN WITHOUT SCIATICA, UNSPECIFIED CHRONICITY: ICD-10-CM

## 2019-03-19 DIAGNOSIS — E11.42 DIABETIC POLYNEUROPATHY ASSOCIATED WITH TYPE 2 DIABETES MELLITUS: ICD-10-CM

## 2019-03-19 DIAGNOSIS — E11.9 TYPE 2 DIABETES MELLITUS WITHOUT COMPLICATION, WITHOUT LONG-TERM CURRENT USE OF INSULIN: Primary | ICD-10-CM

## 2019-03-19 DIAGNOSIS — N18.30 STAGE 3 CHRONIC KIDNEY DISEASE: ICD-10-CM

## 2019-03-19 DIAGNOSIS — E78.5 DYSLIPIDEMIA: ICD-10-CM

## 2019-03-19 PROCEDURE — 99214 OFFICE O/P EST MOD 30 MIN: CPT | Mod: S$GLB,,, | Performed by: FAMILY MEDICINE

## 2019-03-19 PROCEDURE — 99999 PR PBB SHADOW E&M-EST. PATIENT-LVL III: ICD-10-PCS | Mod: PBBFAC,,, | Performed by: FAMILY MEDICINE

## 2019-03-19 PROCEDURE — 1101F PT FALLS ASSESS-DOCD LE1/YR: CPT | Mod: S$GLB,,, | Performed by: FAMILY MEDICINE

## 2019-03-19 PROCEDURE — 99999 PR PBB SHADOW E&M-EST. PATIENT-LVL III: CPT | Mod: PBBFAC,,, | Performed by: FAMILY MEDICINE

## 2019-03-19 PROCEDURE — 99499 UNLISTED E&M SERVICE: CPT | Mod: S$GLB,,, | Performed by: FAMILY MEDICINE

## 2019-03-19 PROCEDURE — 99214 PR OFFICE/OUTPT VISIT, EST, LEVL IV, 30-39 MIN: ICD-10-PCS | Mod: S$GLB,,, | Performed by: FAMILY MEDICINE

## 2019-03-19 PROCEDURE — 99499 RISK ADDL DX/OHS AUDIT: ICD-10-PCS | Mod: S$GLB,,, | Performed by: FAMILY MEDICINE

## 2019-03-19 PROCEDURE — 1101F PR PT FALLS ASSESS DOC 0-1 FALLS W/OUT INJ PAST YR: ICD-10-PCS | Mod: S$GLB,,, | Performed by: FAMILY MEDICINE

## 2019-03-19 RX ORDER — MELOXICAM 15 MG/1
15 TABLET ORAL DAILY
Qty: 7 TABLET | Refills: 0 | Status: SHIPPED | OUTPATIENT
Start: 2019-03-19 | End: 2019-05-29

## 2019-03-19 RX ORDER — METFORMIN HYDROCHLORIDE 500 MG/1
TABLET ORAL
Qty: 180 TABLET | Refills: 3 | Status: SHIPPED | OUTPATIENT
Start: 2019-03-19 | End: 2020-04-27 | Stop reason: SDUPTHER

## 2019-03-19 NOTE — TELEPHONE ENCOUNTER
Spoke with spouse and she states that the medication that patient took at [facility] was methocarbamol 750 mg for 5 day. Spouse also states that patient received flu shot in 10/31/18 visit in clinic. I see no documentation in chart or in wrap up.

## 2019-03-19 NOTE — TELEPHONE ENCOUNTER
----- Message from Henry Leo sent at 3/19/2019  2:19 PM CDT -----  Contact: Karrie Cespedes - Spouse  Type: Needs Medical Advice    Who Called:  Karrie  Best Call Back Number: [phone]  Additional Information: Caller would like to discuss new medication from urgent care. Please call to advise. Thanks!

## 2019-03-19 NOTE — PROGRESS NOTES
Neurosurgery History & Physical    Patient ID: Cecilio Cespedes is a 79 y.o. male.    Chief Complaint   Patient presents with    Low-back Pain     He has had low back pain for 3 weeks. Pain is constant.. Nothing seems to make pain better. Walking makes pain worse.       Review of Systems   Constitutional: Negative for activity change, chills, fatigue and unexpected weight change.   HENT: Negative for hearing loss, tinnitus, trouble swallowing and voice change.    Eyes: Negative for visual disturbance.   Respiratory: Negative for apnea, chest tightness and shortness of breath.    Cardiovascular: Negative for chest pain and palpitations.   Gastrointestinal: Negative for abdominal pain, constipation, diarrhea, nausea and vomiting.   Genitourinary: Negative for difficulty urinating, dysuria and frequency.   Musculoskeletal: Positive for back pain. Negative for gait problem, neck pain and neck stiffness.   Skin: Negative for wound.   Neurological: Negative for dizziness, tremors, seizures, facial asymmetry, speech difficulty, weakness, light-headedness, numbness and headaches.   Psychiatric/Behavioral: Negative for confusion and decreased concentration.       Past Medical History:   Diagnosis Date    Diabetes 2001    Hyperlipidemia     Stage 3 chronic kidney disease 12/12/2018    Type 2 diabetes mellitus      Social History     Socioeconomic History    Marital status:      Spouse name: Not on file    Number of children: Not on file    Years of education: Not on file    Highest education level: Not on file   Social Needs    Financial resource strain: Not on file    Food insecurity - worry: Not on file    Food insecurity - inability: Not on file    Transportation needs - medical: Not on file    Transportation needs - non-medical: Not on file   Occupational History    Not on file   Tobacco Use    Smoking status: Former Smoker     Packs/day: 2.00     Years: 20.00     Pack years: 40.00     Types:  "Cigars     Last attempt to quit: 2016     Years since quittin.6    Smokeless tobacco: Never Used    Tobacco comment: 2 cigars per day   Substance and Sexual Activity    Alcohol use: Yes     Comment: occasionally    Drug use: No    Sexual activity: Not on file   Other Topics Concern    Not on file   Social History Narrative    Not on file     Family History   Problem Relation Age of Onset    Diabetes Father     Diabetes Brother     Cancer Mother         stomach     Review of patient's allergies indicates:   Allergen Reactions    No known allergies        Current Outpatient Medications:     ONETOUCH DELICA LANCETS 33 gauge Misc, USE TO TEST 1-2 TIMES A DAY, Disp: , Rfl: 1    ONETOUCH ULTRA TEST Strp, USE TO TEXT 1-2 TIMES A DAY, Disp: 200 strip, Rfl: 3    ONETOUCH ULTRA2 kit, USE TO TEST DAILY, Disp: , Rfl: 0    pravastatin (PRAVACHOL) 20 MG tablet, Take 1 tablet (20 mg total) by mouth once daily., Disp: 90 tablet, Rfl: 3    glimepiride (AMARYL) 1 MG tablet, Take 1 tablet (1 mg total) by mouth before breakfast., Disp: 90 tablet, Rfl: 3    meloxicam (MOBIC) 15 MG tablet, Take 1 tablet (15 mg total) by mouth once daily., Disp: 7 tablet, Rfl: 0    metFORMIN (GLUCOPHAGE) 500 MG tablet, One po bid, Disp: 180 tablet, Rfl: 3    tiZANidine (ZANAFLEX) 4 MG tablet, Take 1 tablet (4 mg total) by mouth every 8 (eight) hours as needed (muscle spasms)., Disp: 40 tablet, Rfl: 0    Vitals:    19 1432   BP: 136/78   BP Location: Right arm   Patient Position: Sitting   BP Method: Medium (Automatic)   Pulse: 86   Weight: 75.8 kg (167 lb 1.7 oz)   Height: 5' 8" (1.727 m)       Physical Exam   Constitutional: He is oriented to person, place, and time. He appears well-developed and well-nourished.   HENT:   Head: Normocephalic and atraumatic.   Eyes: Pupils are equal, round, and reactive to light.   Neck: Normal range of motion. Neck supple.   Cardiovascular: Normal rate.   Pulmonary/Chest: Effort normal. "   Abdominal: He exhibits no distension.   Musculoskeletal: Normal range of motion. He exhibits no edema.   Neurological: He is alert and oriented to person, place, and time. He has a normal Finger-Nose-Finger Test, a normal Heel to Shin Test, a normal Romberg Test and a normal Tandem Gait Test. Gait normal.   Reflex Scores:       Tricep reflexes are 2+ on the right side and 2+ on the left side.       Bicep reflexes are 2+ on the right side and 2+ on the left side.       Brachioradialis reflexes are 2+ on the right side and 2+ on the left side.       Patellar reflexes are 2+ on the right side and 2+ on the left side.       Achilles reflexes are 2+ on the right side and 2+ on the left side.  Skin: Skin is warm and dry.   Psychiatric: He has a normal mood and affect. His speech is normal and behavior is normal. Judgment and thought content normal.   Nursing note and vitals reviewed.      Neurologic Exam     Mental Status   Oriented to person, place, and time.   Oriented to person.   Oriented to place.   Oriented to time.   Follows 3 step commands.   Attention: normal. Concentration: normal.   Speech: speech is normal   Level of consciousness: alert  Knowledge: consistent with education.   Able to name object. Able to read. Able to repeat. Able to write. Normal comprehension.     Cranial Nerves     CN II   Visual acuity: normal  Right visual field deficit: none  Left visual field deficit: none     CN III, IV, VI   Pupils are equal, round, and reactive to light.  Right pupil: Size: 3 mm. Shape: regular. Reactivity: brisk. Consensual response: intact.   Left pupil: Size: 3 mm. Shape: regular. Reactivity: brisk. Consensual response: intact.   CN III: no CN III palsy  CN VI: no CN VI palsy  Nystagmus: none   Diplopia: none  Ophthalmoparesis: none  Conjugate gaze: present    CN V   Right facial sensation deficit: none  Left facial sensation deficit: none    CN VII   Right facial weakness: none  Left facial weakness:  none    CN VIII   Hearing: intact    CN IX, X   CN IX normal.   CN X normal.     CN XI   Right sternocleidomastoid strength: normal  Left sternocleidomastoid strength: normal  Right trapezius strength: normal  Left trapezius strength: normal    CN XII   Fasciculations: absent  Tongue deviation: none    Motor Exam   Muscle bulk: normal  Overall muscle tone: normal  Right arm pronator drift: absent  Left arm pronator drift: absent    Strength   Right neck flexion: 5/5  Left neck flexion: 5/5  Right neck extension: 5/5  Left neck extension: 5/5  Right deltoid: 5/5  Left deltoid: 5/5  Right biceps: 5/5  Left biceps: 5/5  Right triceps: 5/5  Left triceps: 5/5  Right wrist flexion: 5/5  Left wrist flexion: 5/5  Right wrist extension: 5/5  Left wrist extension: 5/5  Right interossei: 5/5  Left interossei: 5/5  Right abdominals: 5/5  Left abdominals: 5/5  Right iliopsoas: 5/5  Left iliopsoas: 5/5  Right quadriceps: 5/5  Left quadriceps: 5/5  Right hamstrin/5  Left hamstrin/5  Right glutei: 5/5  Left glutei: 5/5  Right anterior tibial: 5/5  Left anterior tibial: 5/5  Right posterior tibial: 5/5  Left posterior tibial: 5/5  Right peroneal: 5/5  Left peroneal: 5/5  Right gastroc: 5/5  Left gastroc: 5/5    Sensory Exam   Right arm light touch: normal  Left arm light touch: normal  Right leg light touch: normal  Left leg light touch: normal  Right arm vibration: normal  Left arm vibration: normal  Right arm pinprick: normal  Left arm pinprick: normal    Gait, Coordination, and Reflexes     Gait  Gait: normal    Coordination   Romberg: negative  Finger to nose coordination: normal  Heel to shin coordination: normal  Tandem walking coordination: normal    Tremor   Resting tremor: absent  Intention tremor: absent  Action tremor: absent    Reflexes   Right brachioradialis: 2+  Left brachioradialis: 2+  Right biceps: 2+  Left biceps: 2+  Right triceps: 2+  Left triceps: 2+  Right patellar: 2+  Left patellar: 2+  Right  achilles: 2+  Left achilles: 2+  Right Lewis: absent  Left Lewis: absent  Right ankle clonus: absent  Left ankle clonus: absent      Provider dictation:  79 year old  male former smoker with kidney disease and diabetes is self referred for onset back pain.  He has had pain across the lower back for 3-4 weeks wihtout focal triggering event. It is constant and increased in intensity with walking.  He denies radicular leg pain, numbness and tingling., and weakness.  He has tried aleve and muscle relaxants without benefit.  Pain has not changed since onset.    Oswestry score: 38%.  PHQ:  8.    He is neurologically intact on exam without focal deficits noted.  5/5 strength with 2+ muscle stretch reflexes and no sensory deficits throughout.    He has not had any imaging.    Mr. Cespedes has acute onset back pain wihtout radiculopathy or neurolgocical deficit; most likely myofascial in nature.  We discussed the nature of back pain and exercises to help.  I am referring him to PT and prescribing a muscle relaxant for spasms.  Follow up in [facility] if no improvent; at which time we would discuss further imaging.      Visit Diagnosis:  Acute bilateral low back pain without sciatica  -     Discontinue: tiZANidine (ZANAFLEX) 4 MG tablet; Take 1 tablet (4 mg total) by mouth every 8 (eight) hours as needed (muscle spasms).  Dispense: 40 tablet; Refill: 0  -     tiZANidine (ZANAFLEX) 4 MG tablet; Take 1 tablet (4 mg total) by mouth every 8 (eight) hours as needed (muscle spasms).  Dispense: 40 tablet; Refill: 0  -     Ambulatory Referral to Physical/Occupational Therapy        Total time spent counseling greater than fifty percent of total visit time.  Counseling included discussion regarding imaging findings, diagnosis possibilities, treatment options, risks and benefits.   The patient had many questions regarding the options and long-term effects.

## 2019-03-19 NOTE — PROGRESS NOTES
THIS DOCUMENT WAS MADE IN PART WITH VOICE RECOGNITION SOFTWARE.  OCCASIONALLY THIS SOFTWARE WILL MISINTERPRET WORDS OR PHRASES.      Cecilio COUGHLIN Coy  1940    Cecilio COUGHLIN was seen today for diabetes.    Diagnoses and all orders for this visit:    Type 2 diabetes mellitus without complication, without long-term current use of insulin  -     metFORMIN (GLUCOPHAGE) 500 MG tablet; One po bid  -     Lipid panel; Future  -     Hemoglobin A1c; Future  -     Comprehensive metabolic panel; Future  Mild worsening but we had reduced the dosage of metformin.  Also it sounds like he had a corticosteroid injection 3 weeks ago for his back pain which may have also affected this.  For now continue current medications    Diabetic polyneuropathy associated with type 2 diabetes mellitus  Stable    Dyslipidemia  Mild worsening    Stage 3 chronic kidney disease  Stable see below    Bilateral low back pain without sciatica, unspecified chronicity  -     meloxicam (MOBIC) 15 MG tablet; Take 1 tablet (15 mg total) by mouth once daily.  He states therapy has been ordered but he still waiting to hear.  He will let me know if he has trouble.  He is currently taking Aleve.  Going to discontinue this temporarily in place him on meloxicam for 7 days.  NSAID precautions especially given the fact that he has CKD.  But for short-term relief I feel this is reasonable.      Subjective     Chief Complaint   Patient presents with    Diabetes     follow up        HPI   Type 2 diabetes mellitus without complication, without long-term current use of insulin  This is a chronic condition.  It has worsened though still technically satisfactory.      Diabetic polyneuropathy associated with type 2 diabetes mellitus  neuropathy is mild and stable for    Dyslipidemia  This chronic condition has worsened as well.    Stage 3 chronic kidney disease  Chronic stable, no NSAID usage.  No diuretics.    Low back pain  Has been seen b/c lbp, PT pending  Muscle  relaxants not helping    Active Ambulatory Problems     Diagnosis Date Noted    Hyperlipidemia 03/24/2014    Type 2 diabetes mellitus without complication 07/15/2016    Diabetic polyneuropathy associated with type 2 diabetes mellitus 01/30/2018    Heloma molle - Right Foot 09/11/2018    Hammer toe of right foot - Right Foot 09/11/2018    Pain in toe of right foot - Right Foot 09/11/2018    Type 2 diabetes mellitus with peripheral neuropathy 09/11/2018    PVD (peripheral vascular disease) 09/11/2018    SOB (shortness of breath) on exertion 10/15/2018    Stage 3 chronic kidney disease 12/12/2018     Resolved Ambulatory Problems     Diagnosis Date Noted    Tobacco dependence 07/15/2016     Past Medical History:   Diagnosis Date    Diabetes 2001    Hyperlipidemia     Stage 3 chronic kidney disease 12/12/2018    Type 2 diabetes mellitus          Review of Systems   Constitutional: Positive for activity change. Negative for fever.   Respiratory: Negative.    Cardiovascular: Negative.    Gastrointestinal: Negative.  Negative for abdominal pain and blood in stool.   Endocrine: Negative for polydipsia and polyuria.   Genitourinary: Negative for dysuria and hematuria.   Musculoskeletal: Positive for arthralgias and back pain.       Objective     Physical Exam   Constitutional: He is oriented to person, place, and time. He appears well-developed and well-nourished.   He appears uncomfortable, sitting awkwardly and shifting positions   HENT:   Head: Normocephalic and atraumatic.   Eyes: Conjunctivae are normal. No scleral icterus.   Pulmonary/Chest: Effort normal. No respiratory distress.   Musculoskeletal:   Muscle tightness in the lumbar region bilaterally. No point tenderness over the spinous processes.  Decreased range of motion.  Abdomen is benign.  Strength and reflexes are normal.   Neurological: He is alert and oriented to person, place, and time. Coordination normal.   Psychiatric: He has a normal mood  "and affect. His behavior is normal.     Vitals:    03/19/19 0945   BP: (!) 106/58   BP Location: Right arm   Patient Position: Sitting   BP Method: Large (Manual)   Pulse: 74   Resp: 18   Temp: 97.6 °F (36.4 °C)   TempSrc: Oral   Weight: 75.2 kg (165 lb 12.6 oz)   Height: 5' 8" (1.727 m)       MOST RECENT LABS IN OUR ELECTRONIC MEDICAL RECORD:     Results for orders placed or performed in visit on 03/12/19   Microalbumin/creatinine urine ratio   Result Value Ref Range    Microalbum.,U,Random 11.0 ug/mL    Creatinine, Random Ur 122.0 23.0 - 375.0 mg/dL    Microalb Creat Ratio 9.0 0.0 - 30.0 ug/mg         "

## 2019-03-22 ENCOUNTER — CLINICAL SUPPORT (OUTPATIENT)
Dept: REHABILITATION | Facility: HOSPITAL | Age: 79
End: 2019-03-22
Payer: MEDICARE

## 2019-03-22 DIAGNOSIS — R26.9 GAIT ABNORMALITY: ICD-10-CM

## 2019-03-22 DIAGNOSIS — R26.89 IMPAIRMENT OF BALANCE: ICD-10-CM

## 2019-03-22 DIAGNOSIS — M25.60 LIMITATION OF JOINT MOTION OF LUMBAR SPINE: ICD-10-CM

## 2019-03-22 DIAGNOSIS — M54.50 ACUTE BILATERAL LOW BACK PAIN WITHOUT SCIATICA: ICD-10-CM

## 2019-03-22 DIAGNOSIS — M53.86 DECREASED RANGE OF MOTION OF LUMBAR SPINE: ICD-10-CM

## 2019-03-22 PROCEDURE — 97110 THERAPEUTIC EXERCISES: CPT | Mod: PN

## 2019-03-22 PROCEDURE — 97161 PT EVAL LOW COMPLEX 20 MIN: CPT | Mod: PN

## 2019-03-22 NOTE — PLAN OF CARE
OUTPATIENT PHYSICAL THERAPY  PHYSICAL THERAPY EVALUATION    Name: Cecilio Cespedes  Clinic Number: [id number]    Evaluation Date: 03/22/2019  Visit #: 1 / 12  Authorization period Expiration: 12/31/2019  Plan of Care Expiration: 06/22/2019  Precautions: Standard, DM    Diagnosis:   Encounter Diagnoses   Name Primary?    Decreased range of motion of lumbar spine     Acute bilateral low back pain without sciatica     Limitation of joint motion of lumbar spine     Impairment of balance     Gait abnormality      Physician: Mary Shelby PA-C  Treatment Orders: PT Eval and Treat  Past Medical History:   Diagnosis Date    Diabetes 2001    Hyperlipidemia     Stage 3 chronic kidney disease 12/12/2018    Type 2 diabetes mellitus      Current Outpatient Medications   Medication Sig    glimepiride (AMARYL) 1 MG tablet Take 1 tablet (1 mg total) by mouth before breakfast.    meloxicam (MOBIC) 15 MG tablet Take 1 tablet (15 mg total) by mouth once daily.    metFORMIN (GLUCOPHAGE) 500 MG tablet One po bid    ONETOUCH DELICA LANCETS 33 gauge Misc USE TO TEST 1-2 TIMES A DAY    ONETOUCH ULTRA TEST Strp USE TO TEXT 1-2 TIMES A DAY    ONETOUCH ULTRA2 kit USE TO TEST DAILY    pravastatin (PRAVACHOL) 20 MG tablet Take 1 tablet (20 mg total) by mouth once daily.    tiZANidine (ZANAFLEX) 4 MG tablet Take 1 tablet (4 mg total) by mouth every 8 (eight) hours as needed (muscle spasms).     No current facility-administered medications for this visit.      Review of patient's allergies indicates:   Allergen Reactions    No known allergies        History   Prior Therapy: No prior PT   Social History: Lives in [facility] with wife   Previous functional status: Prior to incident, pt independent in all ADLs and physical activity    Current functional status: Requires occasional assistance with ambulation, uses cane infrequently   Work: Not currently working     Subjective   History of Present Illness: [facility]  with complaints of acute low back pain. Pt states pain began approximately 2 - 3 weeks ago with insidious onset. Pt states pain occurs bilaterally in lower back, but has recently become worse in the L. Pt reports spasms with no specific onset. Pt experiences symptoms when changing positions after being sedentary for a prolonged periods. Pt denies any prior history of back pain or injury. Pt reports he often wakes up in the night due to back pain, and mostly sleeps on his back. Pt states his gait and balance have become more unsteady since onset of pain. Pt denies history of falls. Pt is a poor historian, requiring several cues to determine nature of and symptoms relating to back pain.     DOI: 2 - 3 weeks ago   Imaging, none:   Pain: current 4/10, worst 9/10, best 0/10, stabbing, intermittent  Radicular symptoms: None   Aggravating factors: None specific   Easing factors: rest, wait for pain to go away   Pts goals: Reduce pain    Objective   Mental status: alert, oriented x3  Posture/ Alignment: In standing, pt with forward head, rounded shoulders, R lateral trunk learn    Movement Analysis: Pt required additional time and effort to complete bed mobility. Pt with increased symptom provocation with position changes. Pt unable to lie prone    GAIT DEVIATIONS: Cecilio J amb with decreased cory, decreased stride length, decreased toe-to-floor clearance and decreased weight-shifting ability.    ROM:     AROM ROM (% of normal) Comment Normal   Flexion: 75% Reduced lumbar reversal  60 deg   Extension: 75%  25 deg   Lateral Flex R: 75%  25 deg   Lateral Flex L: 25% Pain at end range 25 deg   Rotation R: 75%  18 deg   Rotation L: 25%  18 deg   *denotes pain    Strength:        Right Left Comment   Hip Flexion: 5/5 5/5    Knee Extension: 5/5 5/5    Knee Flexion: 5/5 5/5    Ankle DF: 5/5 5/5        Special Tests:  - Balance:    - L Modified Tandem: 30 sec, min sway    - R Modified Tandem: 30 sec, min sway    - Feet  "together, eyes open: 30 seconds   - Feet together, eyes closed: 5 seconds       Neurovascular:  - Sensation: Grossly intact to light touch across BLE  - Clonus: negative   - DTR: 2+ BLE  - Neural Tension: (-) Slump    Palpation:  Pt with hypertrophy of L lumbar paraspinals and TTP L QL and paraspinals.     Joint Play:  Hypomobile forward bend and rotation L L1/2 - L4/5 (tested in sidelying)      Pt/family was provided educational information, including: PT evaluation findings, role of PT, goals for PT, scheduling - pt verbalized understanding. Discussed insurance plan with pt.     TREATMENT   Time In: 11:00 AM  Time Out: 12:05 PM    Discussed Plan of Care with patient: Yes    Cecilio COUGHLIN received 10 minutes of therapeutic exercises including:   LTR 10 x 5" hold   SKTC 10 x 5" hold   Pelvic Tilts (ROM only) 10 x 5" hold     (next visit: HSS supine vs seated, ball roll outs, DKTC, standing lumbar ROM (sidebending), sit to stand)    Pt received MHP x 5 minutes to lumbar spine in sitting to promote pain control and relaxation. Pt skin intact following removal of MHP.     Written Home Exercises Provided: See Patient Instructions   Exercises were reviewed and Cecilio COUGHLIN was able to demonstrate them prior to the end of the session. Pt received a written copy of exercises to perform at home. Cecilio COUGHLIN demonstrated good  understanding of the education provided.     Assessment   Cecilio COUGHLIN is a 79 y.o. male referred to outpatient physical therapy with a medical diagnosis of acute bilateral low back pain without sciatica. Pt demonstrates decreased AROM of lumbar spine, specifically into L rotation and sidebending, decreased functional mobility, impaired gait and balance, and increased muscle guarding of lumbar paraspinals. Pt is a good candidate for skilled therapy services at this time to restore lumbar mobility, normalize resting muscle tone, and improve functional mobility; so he may increase independence and safety with ADLs and " ambulation. Pt prognosis is Good. Positive prognostic factors include motivation for therapy services. Negative prognostic factors include severity of symptoms. Pt will benefit from skilled outpatient physical therapy to address the above stated deficits, provide pt/family education, and to maximize pt's level of independence.     History  Co-morbidities and personal factors that may impact the plan of care Examination  Body Structures and Functions, activity limitations and participation restrictions that may impact the plan of care    Clinical Presentation   Co-morbidities:   advanced age and diabetes        Personal Factors:   no deficits Body Regions:   back    Body Systems:   gross symmetry  ROM  balance  gait        Participation Restrictions:   Pt requires additional time and effort to complete ADLs     Activity limitations:   Learning and applying knowledge  no deficits    General Tasks and Commands  no deficits    Communication  no deficits    Mobility  no deficits    Self care  no deficits    Domestic Life  no deficits    Interactions/Relationships  no deficits    Life Areas  no deficits    Community and Social Life  no deficits         stable and uncomplicated                      low   moderate  low Decision Making/ Complexity Score:  low     Pt's spiritual, cultural and educational needs considered and pt agreeable to plan of care and goals as stated below:     Anticipated Barriers for physical therapy: none    Short Term GOALS:  In 4 weeks, pt. will  Report decreased back pain < / = 5 /10 to increase tolerance for walking long distances  Pt will report > / = 30% decrease in difficulty tolerating prolonged standing in order to improve ability to perform household chores.   Pt will be able to perform bridging x 5 without pain and clearing the gluts to improve standing tolerance   Pt to tolerate HEP to improve ROM and independence with ADL's      Long Term GOALS:  In 8 weeks, pt. Will:  Report decreased  low back pain < / = 2 /10 to increase tolerance for walking  Pt will perform 5 sit <> stand transfers without UE support to increase independence and tolerance for activity   Increased lumbar flexion and extension ROM to 70% for increased ease with daily activities  - be independent with HEP and SX management     Plan   Outpatient physical therapy 1 - 2 times weekly to include: pt ed, HEP, therapeutic exercises, therapeutic activities, neuromuscular re-education/ balance exercises, manual therapy, dry needling, and modalities prn. Cont PT for 6 - 8 weeks. Pt may be seen by PTA as part of the rehabilitation team.     I certify the need for these services furnished under this plan of treatment and while under my care.    Meena Gunter, PT

## 2019-03-25 ENCOUNTER — CLINICAL SUPPORT (OUTPATIENT)
Dept: REHABILITATION | Facility: HOSPITAL | Age: 79
End: 2019-03-25
Payer: MEDICARE

## 2019-03-25 DIAGNOSIS — R26.89 IMPAIRMENT OF BALANCE: ICD-10-CM

## 2019-03-25 DIAGNOSIS — R26.9 GAIT ABNORMALITY: ICD-10-CM

## 2019-03-25 DIAGNOSIS — M53.86 DECREASED RANGE OF MOTION OF LUMBAR SPINE: ICD-10-CM

## 2019-03-25 DIAGNOSIS — M25.60 LIMITATION OF JOINT MOTION OF LUMBAR SPINE: ICD-10-CM

## 2019-03-25 DIAGNOSIS — M54.50 ACUTE BILATERAL LOW BACK PAIN WITHOUT SCIATICA: ICD-10-CM

## 2019-03-25 PROCEDURE — 97110 THERAPEUTIC EXERCISES: CPT | Mod: PN

## 2019-03-25 NOTE — PROGRESS NOTES
"  Physical Therapy Daily Treatment Note     Name: Cecilio Cespedes  Clinic Number: [id number]    Therapy Diagnosis:   Encounter Diagnoses   Name Primary?    Decreased range of motion of lumbar spine     Acute bilateral low back pain without sciatica     Limitation of joint motion of lumbar spine     Impairment of balance     Gait abnormality      Physician: Mary Shelby PA-C    Visit Date: 3/25/2019  Evaluation Date: 3/22/2019  Authorization Period Expiration: 12/31/2019  Plan of Care Certification Period: 6/22/2019  Visit #/Visits authorized: 2 / 12     Time In: 10:05  Time Out: 10:55  Total Billable Time: 50 minutes    Precautions: Standard and Diabetes    Subjective     Pt reports: he felt a little worse yesterday, increased pain across the low back with L side worst.   He was compliant with home exercise program.  Response to previous treatment: no better no worse  Functional change: none    Pain: 7/10  Location: low back, L more than R    Objective     Pt received MH x 10 minutes to lumbar spine in supine while performing therex for increased muscle relaxation and pain control. Skin intact upon removal of MH.    Gene received therapeutic exercises to develop strength, endurance, ROM, flexibility, posture and core stabilization for 50 minutes including:    LTR x 3" hold x 2 min  SKTC with towel behind knee 10 x 5" hold   Pelvic Tilts (ROM only) 10 x 5" hold   ADDED:  DKTC with towel behind knees 10 x 5" (Iva from PTA for stretch)  Seated HSS with 5 slow deep breaths x 3 each leg  Green ball roll outs for lumbar flexion  Sit-stand from elevated mat    Possible for future visit: standing lumbar ROM (sidebending)       Education provided:   - patient to activate abdominals when performing transfers or daily activities in order to reduce lumbar compensation and onset of spasms.    Written Home Exercises Provided: yes. Patient given verbal and written instruction for seated HSS. See Patient instructions in " Notes section of EMR.  Exercises were reviewed and Calr was able to demonstrate them prior to the end of the session.  Carl demonstrated good  understanding of the education provided.     Assessment     Carl demonstrated overall good tolerance to treatment this date without exacerbation of symptoms. Pain reduced to 3-4/10 post treatment, however states he just wants the pain to go away. Improved mobility toward end of session with lateral trunk rotations and seated lumbar flexion ball roll outs, however pain remained. He will benefit from continued stretching and gentle strengthening to improve trunk stability and reduce lumbar compensation.  Carl is progressing well towards his goals.   Pt prognosis is Good.     Pt will continue to benefit from skilled outpatient physical therapy to address the deficits listed in the problem list box on initial evaluation, provide pt/family education and to maximize pt's level of independence in the home and community environment.     Pt's spiritual, cultural and educational needs considered and pt agreeable to plan of care and goals.    Anticipated barriers to physical therapy: none    Goals:   Short Term GOALS:  In 4 weeks, pt. will  Report decreased back pain < / = 5 /10 to increase tolerance for walking long distances  Pt will report > / = 30% decrease in difficulty tolerating prolonged standing in order to improve ability to perform household chores.   Pt will be able to perform bridging x 5 without pain and clearing the gluts to improve standing tolerance   Pt to tolerate HEP to improve ROM and independence with ADL's        Long Term GOALS:  In 8 weeks, pt. Will:  Report decreased low back pain < / = 2 /10 to increase tolerance for walking  Pt will perform 5 sit <> stand transfers without UE support to increase independence and tolerance for activity   Increased lumbar flexion and extension ROM to 70% for increased ease with daily activities  - be independent with HEP and SX  management       Plan     Plan to continue with established POC toward current PT goals.    Aleyda Dawson, PETAR

## 2019-03-29 ENCOUNTER — CLINICAL SUPPORT (OUTPATIENT)
Dept: REHABILITATION | Facility: HOSPITAL | Age: 79
End: 2019-03-29
Payer: MEDICARE

## 2019-03-29 DIAGNOSIS — M53.86 DECREASED RANGE OF MOTION OF LUMBAR SPINE: ICD-10-CM

## 2019-03-29 DIAGNOSIS — M25.60 LIMITATION OF JOINT MOTION OF LUMBAR SPINE: ICD-10-CM

## 2019-03-29 DIAGNOSIS — R26.9 GAIT ABNORMALITY: ICD-10-CM

## 2019-03-29 DIAGNOSIS — R26.89 IMPAIRMENT OF BALANCE: ICD-10-CM

## 2019-03-29 DIAGNOSIS — M54.50 ACUTE BILATERAL LOW BACK PAIN WITHOUT SCIATICA: ICD-10-CM

## 2019-03-29 PROCEDURE — 97140 MANUAL THERAPY 1/> REGIONS: CPT | Mod: PN

## 2019-03-29 PROCEDURE — 97110 THERAPEUTIC EXERCISES: CPT | Mod: PN

## 2019-03-29 NOTE — PROGRESS NOTES
"  Physical Therapy Daily Treatment Note     Name: Cecilio Cespedes  Clinic Number: [id number]    Therapy Diagnosis:   Encounter Diagnoses   Name Primary?    Decreased range of motion of lumbar spine     Acute bilateral low back pain without sciatica     Limitation of joint motion of lumbar spine     Impairment of balance     Gait abnormality      Physician: Mary Shelby PA-C    Visit Date: 3/29/2019  Evaluation Date: 3/22/2019  Authorization Period Expiration: 12/31/2019  Plan of Care Certification Period: 6/22/2019  Visit #/Visits authorized: 3 / 12     Time In: 10:00  Time Out: 10:20   Total Billable Time: 20 minutes    Precautions: Standard and Diabetes    Subjective     Pt reports: he is feeling a little better. Pt denies any significant soreness following previous treatment session.   He was compliant with home exercise program.  Response to previous treatment: no better no worse  Functional change: none    Pain: 3/10  Location: low back, L more than R    Objective     Cecilio COUGHLIN received the following manual therapy techniques: Joint mobilizations and Soft tissue Mobilization were applied to the: lumbar spine for 20 minutes.  STM to lumbar paraspinals and QL in R sidleying   STM to lumbar paraspinals with fwd bend movement   PA mobilizations to L1 - L5   PA mobilization to TP       Remainder of treatment session performed with Aleyda Dawson PTA please refer to documentation  Pt received MH x 10 minutes to lumbar spine in supine while performing therex for increased muscle relaxation and pain control. Skin intact upon removal of MH.    Gene received therapeutic exercises to develop strength, endurance, ROM, flexibility, posture and core stabilization for 50 minutes including:    LTR x 3" hold x 2 min  SKTC with towel behind knee 10 x 5" hold   Pelvic Tilts (ROM only) 10 x 5" hold   DKTC with towel behind knees 10 x 5" (Iva from PTA for stretch)  Seated HSS with 5 slow deep breaths x 3 each leg  Green " ball roll outs for lumbar flexion  Sit-stand from elevated mat    Possible for future visit: standing lumbar ROM (sidebending)       Education provided:   - patient to activate abdominals when performing transfers or daily activities in order to reduce lumbar compensation and onset of spasms.    Written Home Exercises Provided: Patient instructed to cont prior HEP.   Exercises were reviewed and Carl was able to demonstrate them prior to the end of the session.  Gene demonstrated good  understanding of the education provided.     Assessment   Gene demonstrated good tolerance for manual therapy treatment. Pt demonstrated increased muscle tightness to lumbar paraspinals L>R which improved with continued PT treatment. Pt demonstrated good joint mobility of mid lumbar segments. Pt continues to demonstrate impaired bed mobility.   Gene is progressing well towards his goals.   Pt prognosis is Good.     Pt will continue to benefit from skilled outpatient physical therapy to address the deficits listed in the problem list box on initial evaluation, provide pt/family education and to maximize pt's level of independence in the home and community environment.     Pt's spiritual, cultural and educational needs considered and pt agreeable to plan of care and goals.    Anticipated barriers to physical therapy: none    Goals:   Short Term GOALS:  In 4 weeks, pt. will  Report decreased back pain < / = 5 /10 to increase tolerance for walking long distances  Pt will report > / = 30% decrease in difficulty tolerating prolonged standing in order to improve ability to perform household chores.   Pt will be able to perform bridging x 5 without pain and clearing the gluts to improve standing tolerance   Pt to tolerate HEP to improve ROM and independence with ADL's        Long Term GOALS:  In 8 weeks, pt. Will:  Report decreased low back pain < / = 2 /10 to increase tolerance for walking  Pt will perform 5 sit <> stand transfers without UE  support to increase independence and tolerance for activity   Increased lumbar flexion and extension ROM to 70% for increased ease with daily activities  - be independent with HEP and SX management       Plan     Plan to continue with established POC toward current PT goals.    Meena Gunter, PT

## 2019-03-29 NOTE — PROGRESS NOTES
"  Physical Therapy Daily Treatment Note     Name: Cecilio Cespedes  Clinic Number: [id number]    Therapy Diagnosis:   Encounter Diagnoses   Name Primary?    Decreased range of motion of lumbar spine     Acute bilateral low back pain without sciatica     Limitation of joint motion of lumbar spine     Impairment of balance     Gait abnormality      Physician: Mary Shelby PA-C    Visit Date: 3/29/2019  Evaluation Date: 3/22/2019  Authorization Period Expiration: 12/31/2019  Plan of Care Certification Period: 6/22/2019  Visit #/Visits authorized: 3 / 12     Time In: 10:20 (Pt began session with MONICA Gunter PT)  Time Out: 10:50 (patient elected to finish early)  Total Billable Time: 30 minutes    Precautions: Standard and Diabetes    Subjective     Pt reports: see PT documentation.   He was compliant with home exercise program.  Response to previous treatment: no better no worse  Functional change: none    Pain: 3/10 following manual techniques with PT  Location: low back, L more than R    Objective     Pt received MH x 10 minutes to lumbar spine in supine while performing therex for increased muscle relaxation and pain control. Skin intact upon removal of MH.    Gene received therapeutic exercises to develop strength, endurance, ROM, flexibility, posture and core stabilization for 30 minutes including:    LTR x 3" hold x 2 min  SKTC with towel behind knee 10 x 5" hold   Pelvic Tilts (ROM only) 15 x 5" hold   DKTC with towel behind knees 10 x 5" (Iva from PTA for stretch)  Hip add squeeze with ball 10 x 3"  Hip abd with YTB 2x10  Seated HSS with 5 slow deep breaths x 3 each leg  Green ball roll outs for lumbar flexion 10 x 5"  (NP) Sit-stand from elevated mat    Possible for future visit: standing lumbar ROM (sidebending)       Education provided:   - patient to activate abdominals when performing transfers or daily activities in order to reduce lumbar compensation and onset of spasms.    Written Home Exercises " "Provided: yes. Patient given verbal and written instruction for seated HSS. See Patient instructions in Notes section of EMR.  Exercises were reviewed and Carl was able to demonstrate them prior to the end of the session.  Carl demonstrated good  understanding of the education provided.     Assessment     Carl tolerated exercises well overall however elected to finish PT early stating "I've just had enough for today". Reports no change in pain to the low back post treatment, remaining 3/10 across the low back.  Patient did ambulate out of clinic with increased upright posture.   Carl is progressing well towards his goals.   Pt prognosis is Good.     Pt will continue to benefit from skilled outpatient physical therapy to address the deficits listed in the problem list box on initial evaluation, provide pt/family education and to maximize pt's level of independence in the home and community environment.     Pt's spiritual, cultural and educational needs considered and pt agreeable to plan of care and goals.    Anticipated barriers to physical therapy: none    Goals:   Short Term GOALS:  In 4 weeks, pt. will  Report decreased back pain < / = 5 /10 to increase tolerance for walking long distances  Pt will report > / = 30% decrease in difficulty tolerating prolonged standing in order to improve ability to perform household chores.   Pt will be able to perform bridging x 5 without pain and clearing the gluts to improve standing tolerance   Pt to tolerate HEP to improve ROM and independence with ADL's        Long Term GOALS:  In 8 weeks, pt. Will:  Report decreased low back pain < / = 2 /10 to increase tolerance for walking  Pt will perform 5 sit <> stand transfers without UE support to increase independence and tolerance for activity   Increased lumbar flexion and extension ROM to 70% for increased ease with daily activities  - be independent with HEP and SX management       Plan     Plan to continue with established POC " toward current PT goals.    Aleyda Dawson, PTA

## 2019-04-01 ENCOUNTER — CLINICAL SUPPORT (OUTPATIENT)
Dept: REHABILITATION | Facility: HOSPITAL | Age: 79
End: 2019-04-01
Payer: MEDICARE

## 2019-04-01 DIAGNOSIS — M25.60 LIMITATION OF JOINT MOTION OF LUMBAR SPINE: ICD-10-CM

## 2019-04-01 DIAGNOSIS — M53.86 DECREASED RANGE OF MOTION OF LUMBAR SPINE: ICD-10-CM

## 2019-04-01 DIAGNOSIS — R26.89 IMPAIRMENT OF BALANCE: ICD-10-CM

## 2019-04-01 DIAGNOSIS — R26.9 GAIT ABNORMALITY: ICD-10-CM

## 2019-04-01 DIAGNOSIS — M54.50 ACUTE BILATERAL LOW BACK PAIN WITHOUT SCIATICA: ICD-10-CM

## 2019-04-01 PROCEDURE — 97112 NEUROMUSCULAR REEDUCATION: CPT | Mod: PN

## 2019-04-01 PROCEDURE — 97140 MANUAL THERAPY 1/> REGIONS: CPT | Mod: PN

## 2019-04-01 NOTE — PROGRESS NOTES
"  Physical Therapy Daily Treatment Note     Name: Cecilio Cespedes  Clinic Number: [id number]    Therapy Diagnosis:   Encounter Diagnoses   Name Primary?    Decreased range of motion of lumbar spine     Acute bilateral low back pain without sciatica     Limitation of joint motion of lumbar spine     Impairment of balance     Gait abnormality      Physician: Mary Shelby PA-C    Visit Date: 4/1/2019  Evaluation Date: 3/22/2019  Authorization Period Expiration: 12/31/2019  Plan of Care Certification Period: 6/22/2019  Visit #/Visits authorized: 4 / 12     Time In: 10:05  Time Out: 10:55   Total Billable Time: 30 minutes    Precautions: Standard and Diabetes    Subjective     Pt reports: he is doing much better. Pt states he has increased fatigue from weeks of not being able to be as active at home. Pt states he has consistent dizziness that began about 2 mo ago. Pt states initial balance impairments began around the same time. Pt denies any significant cardiac history, but did state inc symptoms with quick head turns.   He was compliant with home exercise program.  Response to previous treatment: no better no worse  Functional change: none    Pain: 2/10 following manual techniques with PT  Location: low back, L more than R    Objective     Cecilio COUGHLIN received the following manual therapy techniques: Joint mobilizations and Soft tissue Mobilization were applied to the: lumbar spine for 15 minutes.  STM to lumbar paraspinals and QL in R sidleying   STM to lumbar paraspinals with fwd bend movement   (NP) PA mobilizations to L1 - L5   (NP) PA mobilization to TP     Gene received therapeutic exercises to develop strength, endurance, ROM, flexibility, posture and core stabilization for 30 minutes including:  LTR x 3" hold x 2 min  SKTC with towel behind knee 10 x 5" hold   (NP) Pelvic Tilts (ROM only) 15 x 5" hold   (NP) DKTC with towel behind knees 10 x 5" (Iva from PTA for stretch)  (NP) Hip add squeeze with ball " "10 x 3"  Hip abd with GTB 3x10  Seated HSS 3 x 20"   Bridges x 10   Green ball roll outs for lumbar flexion 10 x 5"(performed with manual therapy)  (NP) Sit-stand from elevated mat    Patient participated in neuromuscular re-education activities to improve: Balance, Coordination, Sense, Proprioception and Posture for 10 minutes. The following activities were included:   FT EO x 30"   FT EC 2 x 30"   FT horizontal head turns x 30" on foam x 30" off foam   Marches on foam x 1 min   Heel Taps onto foam x 1 min      Education provided:   - patient to activate abdominals when performing transfers or daily activities in order to reduce lumbar compensation and onset of spasms.    Written Home Exercises Provided: Patient instructed to cont prior HEP.   Exercises were reviewed and Carl was able to demonstrate them prior to the end of the session.  Carl demonstrated good  understanding of the education provided.     Assessment     Gene able to tolerate slight progression of exercises today. Pt required increased rest breaks between therex and balance activities. Pt c/o increased fatigue due to low activity level at home. Pt with significant improvement in gait and balance since initial evaluation. Pt will benefit from progressive strengthening and balance activities to increase endurance. Will progress as tolerated.   Carl is progressing well towards his goals.   Pt prognosis is Good.     Pt will continue to benefit from skilled outpatient physical therapy to address the deficits listed in the problem list box on initial evaluation, provide pt/family education and to maximize pt's level of independence in the home and community environment.     Pt's spiritual, cultural and educational needs considered and pt agreeable to plan of care and goals.    Anticipated barriers to physical therapy: none    Goals:   Short Term GOALS:  In 4 weeks, pt. will  Report decreased back pain < / = 5 /10 to increase tolerance for walking long " distances  Pt will report > / = 30% decrease in difficulty tolerating prolonged standing in order to improve ability to perform household chores.   Pt will be able to perform bridging x 5 without pain and clearing the gluts to improve standing tolerance   Pt to tolerate HEP to improve ROM and independence with ADL's        Long Term GOALS:  In 8 weeks, pt. Will:  Report decreased low back pain < / = 2 /10 to increase tolerance for walking  Pt will perform 5 sit <> stand transfers without UE support to increase independence and tolerance for activity   Increased lumbar flexion and extension ROM to 70% for increased ease with daily activities  - be independent with HEP and SX management     Plan   Plan to continue with established POC toward current PT goals.    Meena Gunter, PT

## 2019-04-03 ENCOUNTER — CLINICAL SUPPORT (OUTPATIENT)
Dept: REHABILITATION | Facility: HOSPITAL | Age: 79
End: 2019-04-03
Payer: MEDICARE

## 2019-04-03 DIAGNOSIS — R26.89 IMPAIRMENT OF BALANCE: ICD-10-CM

## 2019-04-03 DIAGNOSIS — M25.60 LIMITATION OF JOINT MOTION OF LUMBAR SPINE: ICD-10-CM

## 2019-04-03 DIAGNOSIS — R26.9 GAIT ABNORMALITY: ICD-10-CM

## 2019-04-03 DIAGNOSIS — M53.86 DECREASED RANGE OF MOTION OF LUMBAR SPINE: ICD-10-CM

## 2019-04-03 DIAGNOSIS — M54.50 ACUTE BILATERAL LOW BACK PAIN WITHOUT SCIATICA: ICD-10-CM

## 2019-04-03 PROCEDURE — 97140 MANUAL THERAPY 1/> REGIONS: CPT | Mod: PN

## 2019-04-03 PROCEDURE — 97112 NEUROMUSCULAR REEDUCATION: CPT | Mod: PN

## 2019-04-05 ENCOUNTER — CLINICAL SUPPORT (OUTPATIENT)
Dept: REHABILITATION | Facility: HOSPITAL | Age: 79
End: 2019-04-05
Payer: MEDICARE

## 2019-04-05 DIAGNOSIS — R26.9 GAIT ABNORMALITY: ICD-10-CM

## 2019-04-05 DIAGNOSIS — M25.60 LIMITATION OF JOINT MOTION OF LUMBAR SPINE: ICD-10-CM

## 2019-04-05 DIAGNOSIS — M53.86 DECREASED RANGE OF MOTION OF LUMBAR SPINE: ICD-10-CM

## 2019-04-05 DIAGNOSIS — R26.89 IMPAIRMENT OF BALANCE: ICD-10-CM

## 2019-04-05 DIAGNOSIS — M54.50 ACUTE BILATERAL LOW BACK PAIN WITHOUT SCIATICA: ICD-10-CM

## 2019-04-05 PROCEDURE — 97110 THERAPEUTIC EXERCISES: CPT | Mod: PN

## 2019-04-05 PROCEDURE — 97140 MANUAL THERAPY 1/> REGIONS: CPT | Mod: PN

## 2019-04-05 NOTE — PROGRESS NOTES
"  Physical Therapy Daily Treatment Note     Name: Cecilio Cespedes  Clinic Number: [id number]    Therapy Diagnosis:   Encounter Diagnoses   Name Primary?    Decreased range of motion of lumbar spine     Acute bilateral low back pain without sciatica     Limitation of joint motion of lumbar spine     Impairment of balance     Gait abnormality      Physician: Mary Shelby PA-C    Visit Date: 4/5/2019  Evaluation Date: 3/22/2019  Authorization Period Expiration: 12/31/2019  Plan of Care Certification Period: 6/22/2019  Visit #/Visits authorized: 5 / 12     Time In: 10:05  Time Out: 10:47  Total Billable Time: 45 minutes    Precautions: Standard and Diabetes    Subjective     Pt reports: continued pain into the L sided low back. States he is feeling much better than his initial visit but still has the pain in the same place.   He was compliant with home exercise program.  Response to previous treatment: no better no worse  Functional change: none    Pain: 3/10  Location: L-sided low back    Objective     Cecilio COUGHLIN received the following manual therapy techniques: Joint mobilizations and Soft tissue Mobilization were applied to the: lumbar spine for 10 minutes.  STM to lumbar paraspinals and QL in R sidleying   STM to lumbar paraspinals with fwd bend movement   (NP) PA mobilizations to L1 - L5   (NP) PA mobilization to TP     Gene received therapeutic exercises to develop strength, endurance, ROM, flexibility, posture and core stabilization for 30 minutes including:  LTR x 3" hold x 2 min  SKTC with towel behind knee 10 x 5" hold   (NP) Pelvic Tilts (ROM only) 15 x 5" hold   (NP) DKTC with towel behind knees 10 x 5" (Iva from PTA for stretch)  Hip add squeeze with ball 15 x 3"  Hip abd with GTB 3x10  Seated HSS 3 x 20"   Bridges (small ROM) 1x15 (occasional rest breaks) - tactile cues for TA set and glut set   Red ball roll outs for lumbar flexion 10 x 5"(performed with manual therapy)  (NP) Sit-stand from " "elevated mat    Pt deferred this date (do first next session): Patient participated in neuromuscular re-education activities to improve: Balance, Coordination, Sense, Proprioception and Posture for 10 minutes. The following activities were included:   FT EO x 30"   FT EC 2 x 30"   FT horizontal head turns x 30" on foam x 30" off foam   Marches on foam x 45" (pt reported onset of fatigue)   Heel Taps onto foam x 1 min      Education provided:   - patient to activate abdominals when performing transfers or daily activities in order to reduce lumbar compensation and onset of spasms.    Written Home Exercises Provided: Patient instructed to cont prior HEP.   Exercises were reviewed and Carl was able to demonstrate them prior to the end of the session.  Carl demonstrated good  understanding of the education provided.     Assessment     Carl presented with overall good tolerance to completed exercises however reported "I'm kind of tired" and deferred balance activities in the // bars. Required increased cues, both verbal and tactile, with bridges this date for proper abdominal and gluteal activation in order to reduce lumbar compensation. No increase of lumbar pain or soreness noted throughout session and pain remains 3/10 post tx. Pt will benefit from continued progressive strengthening and balance activities as tolerated to increase endurance. Will progress as tolerated.   Carl is progressing well towards his goals.   Pt prognosis is Good.     Pt will continue to benefit from skilled outpatient physical therapy to address the deficits listed in the problem list box on initial evaluation, provide pt/family education and to maximize pt's level of independence in the home and community environment.     Pt's spiritual, cultural and educational needs considered and pt agreeable to plan of care and goals.    Anticipated barriers to physical therapy: none    Goals:   Short Term GOALS:  In 4 weeks, pt. will  Report decreased " back pain < / = 5 /10 to increase tolerance for walking long distances  Pt will report > / = 30% decrease in difficulty tolerating prolonged standing in order to improve ability to perform household chores.   Pt will be able to perform bridging x 5 without pain and clearing the gluts to improve standing tolerance   Pt to tolerate HEP to improve ROM and independence with ADL's        Long Term GOALS:  In 8 weeks, pt. Will:  Report decreased low back pain < / = 2 /10 to increase tolerance for walking  Pt will perform 5 sit <> stand transfers without UE support to increase independence and tolerance for activity   Increased lumbar flexion and extension ROM to 70% for increased ease with daily activities  - be independent with HEP and SX management     Plan   Plan to continue with established POC toward current PT goals.    Aleyda Dawson, PTA

## 2019-04-13 DIAGNOSIS — E11.9 TYPE 2 DIABETES MELLITUS WITHOUT COMPLICATION, WITHOUT LONG-TERM CURRENT USE OF INSULIN: ICD-10-CM

## 2019-04-13 RX ORDER — GLIMEPIRIDE 1 MG/1
TABLET ORAL
Qty: 90 TABLET | Refills: 3 | Status: SHIPPED | OUTPATIENT
Start: 2019-04-13 | End: 2020-04-27 | Stop reason: SDUPTHER

## 2019-05-29 ENCOUNTER — OFFICE VISIT (OUTPATIENT)
Dept: GASTROENTEROLOGY | Facility: CLINIC | Age: 79
End: 2019-05-29
Payer: MEDICARE

## 2019-05-29 VITALS
HEART RATE: 75 BPM | BODY MASS INDEX: 25.56 KG/M2 | DIASTOLIC BLOOD PRESSURE: 80 MMHG | WEIGHT: 168.63 LBS | SYSTOLIC BLOOD PRESSURE: 141 MMHG | HEIGHT: 68 IN

## 2019-05-29 DIAGNOSIS — K59.09 INTERMITTENT CONSTIPATION: ICD-10-CM

## 2019-05-29 DIAGNOSIS — R10.13 DYSPEPSIA: ICD-10-CM

## 2019-05-29 DIAGNOSIS — R19.8 IRREGULAR BOWEL HABITS: ICD-10-CM

## 2019-05-29 DIAGNOSIS — R19.7 DIARRHEA, UNSPECIFIED TYPE: Primary | ICD-10-CM

## 2019-05-29 DIAGNOSIS — Z86.010 HX OF COLONIC POLYPS: ICD-10-CM

## 2019-05-29 PROCEDURE — 99499 RISK ADDL DX/OHS AUDIT: ICD-10-PCS | Mod: S$GLB,,, | Performed by: NURSE PRACTITIONER

## 2019-05-29 PROCEDURE — 99999 PR PBB SHADOW E&M-EST. PATIENT-LVL IV: CPT | Mod: PBBFAC,,, | Performed by: NURSE PRACTITIONER

## 2019-05-29 PROCEDURE — 99214 OFFICE O/P EST MOD 30 MIN: CPT | Mod: S$GLB,,, | Performed by: NURSE PRACTITIONER

## 2019-05-29 PROCEDURE — 1101F PT FALLS ASSESS-DOCD LE1/YR: CPT | Mod: S$GLB,,, | Performed by: NURSE PRACTITIONER

## 2019-05-29 PROCEDURE — 99999 PR PBB SHADOW E&M-EST. PATIENT-LVL IV: ICD-10-PCS | Mod: PBBFAC,,, | Performed by: NURSE PRACTITIONER

## 2019-05-29 PROCEDURE — 99499 UNLISTED E&M SERVICE: CPT | Mod: S$GLB,,, | Performed by: NURSE PRACTITIONER

## 2019-05-29 PROCEDURE — 1101F PR PT FALLS ASSESS DOC 0-1 FALLS W/OUT INJ PAST YR: ICD-10-PCS | Mod: S$GLB,,, | Performed by: NURSE PRACTITIONER

## 2019-05-29 PROCEDURE — 99214 PR OFFICE/OUTPT VISIT, EST, LEVL IV, 30-39 MIN: ICD-10-PCS | Mod: S$GLB,,, | Performed by: NURSE PRACTITIONER

## 2019-05-29 RX ORDER — OMEPRAZOLE 40 MG/1
40 CAPSULE, DELAYED RELEASE ORAL
Qty: 30 CAPSULE | Refills: 1 | Status: SHIPPED | OUTPATIENT
Start: 2019-05-29 | End: 2019-06-26

## 2019-05-29 RX ORDER — BISACODYL 5 MG
5 TABLET, DELAYED RELEASE (ENTERIC COATED) ORAL DAILY PRN
COMMUNITY

## 2019-05-29 RX ORDER — POLYETHYLENE GLYCOL 3350 17 G/17G
POWDER, FOR SOLUTION ORAL DAILY PRN
COMMUNITY
End: 2019-05-29 | Stop reason: ALTCHOICE

## 2019-05-29 RX ORDER — BISMUTH SUBSALICYLATE 262 MG/1
TABLET, CHEWABLE ORAL
COMMUNITY
End: 2020-02-05

## 2019-05-29 NOTE — PATIENT INSTRUCTIONS

## 2019-05-29 NOTE — PROGRESS NOTES
Subjective:       Patient ID: Cecilio Cespedes is a 79 y.o. male Body mass index is 25.64 kg/m².    Chief Complaint: Abdominal Pain (diarrhea/constipation)    This patient is new to me.  Established patient of Dr. Dejesus.    Patient is here with his wife, whom assisted with history.    GI Problem   The primary symptoms include diarrhea. Primary symptoms do not include fever, weight loss, fatigue, abdominal pain, nausea, vomiting, melena, hematemesis, hematochezia or dysuria. The illness began more than 7 days ago (started ~11/2018 with change in bowel habits with constipation and diarrhea). The problem has been gradually improving.   The diarrhea began more than 1 week ago. Risk factors: denies recent antibiotics/hospitalization, foreign travel, ill contacts, or suspect food intake.   The illness is also significant for bloating and constipation. The illness does not include chills, dysphagia or odynophagia. Associated symptoms comments: Bowel movements are once daily of soft pasty stool rated 6 on bristol stool scale; constipation occurs every other day described as straining with bowel movements; diarrhea (pepto prn, probiotic daily) occurs after taking a laxative (miralax once every other day, dulcolax prn- occasional, fleet's enema). Significant associated medical issues include GERD (described as indigestion; occurs 1-2 times a week, takes lisa-seltzer prn for it).     Review of Systems   Constitutional: Positive for appetite change (decreased). Negative for chills, fatigue, fever and weight loss.   HENT: Negative for sore throat and trouble swallowing.    Respiratory: Negative for cough, choking and shortness of breath.    Cardiovascular: Negative for chest pain.   Gastrointestinal: Positive for bloating, constipation and diarrhea. Negative for abdominal pain, anal bleeding, blood in stool, dysphagia, hematemesis, hematochezia, melena, nausea, rectal pain and vomiting.   Genitourinary: Negative for  difficulty urinating, dysuria and flank pain.   Neurological: Negative for weakness.       Past Medical History:   Diagnosis Date    Colon polyp     Diabetes 2001    Diverticulosis     Hyperlipidemia     Stage 3 chronic kidney disease 12/12/2018    Type 2 diabetes mellitus      Past Surgical History:   Procedure Laterality Date    COLONOSCOPY  03/14/2011    Dr. Dejesus: 2 colon polyps removed (one was 10 mm), diverticulosis, hemorrhoids, repeat in 3 years for surveillance; biopsy: FRAGMENTS OF TUBULAR ADENOMA.    HERNIA REPAIR       Family History   Problem Relation Age of Onset    Diabetes Father     Diabetes Brother     Cancer Mother         stomach    Celiac disease Neg Hx     Colon cancer Neg Hx     Crohn's disease Neg Hx     Ulcerative colitis Neg Hx      Wt Readings from Last 10 Encounters:   05/29/19 76.5 kg (168 lb 10.4 oz)   03/19/19 75.2 kg (165 lb 12.6 oz)   03/14/19 75.8 kg (167 lb 1.7 oz)   02/05/19 76.7 kg (169 lb)   01/07/19 76 kg (167 lb 7 oz)   12/12/18 76.7 kg (169 lb 1.5 oz)   10/31/18 76.7 kg (169 lb 1.5 oz)   10/22/18 76.2 kg (168 lb)   10/15/18 76.3 kg (168 lb 3.4 oz)   09/25/18 77.1 kg (169 lb 15.6 oz)     Lab Results   Component Value Date    WBC 8.34 09/25/2018    HGB 14.3 09/25/2018    HCT 43.1 09/25/2018    MCV 92 09/25/2018     09/25/2018     CMP  Sodium   Date Value Ref Range Status   03/12/2019 142 136 - 145 mmol/L Final     Potassium   Date Value Ref Range Status   03/12/2019 3.9 3.5 - 5.1 mmol/L Final     Chloride   Date Value Ref Range Status   03/12/2019 105 95 - 110 mmol/L Final     CO2   Date Value Ref Range Status   03/12/2019 30 (H) 23 - 29 mmol/L Final     Glucose   Date Value Ref Range Status   03/12/2019 121 (H) 70 - 110 mg/dL Final     BUN, Bld   Date Value Ref Range Status   03/12/2019 23 8 - 23 mg/dL Final     Creatinine   Date Value Ref Range Status   03/12/2019 1.3 0.5 - 1.4 mg/dL Final     Calcium   Date Value Ref Range Status   03/12/2019 10.0  8.7 - 10.5 mg/dL Final     Total Protein   Date Value Ref Range Status   03/12/2019 7.1 6.0 - 8.4 g/dL Final     Albumin   Date Value Ref Range Status   03/12/2019 4.0 3.5 - 5.2 g/dL Final     Total Bilirubin   Date Value Ref Range Status   03/12/2019 0.4 0.1 - 1.0 mg/dL Final     Comment:     For infants and newborns, interpretation of results should be based  on gestational age, weight and in agreement with clinical  observations.  Premature Infant recommended reference ranges:  Up to 24 hours.............<8.0 mg/dL  Up to 48 hours............<12.0 mg/dL  3-5 days..................<15.0 mg/dL  6-29 days.................<15.0 mg/dL       Alkaline Phosphatase   Date Value Ref Range Status   03/12/2019 59 55 - 135 U/L Final     AST   Date Value Ref Range Status   03/12/2019 14 10 - 40 U/L Final     ALT   Date Value Ref Range Status   03/12/2019 16 10 - 44 U/L Final     Anion Gap   Date Value Ref Range Status   03/12/2019 7 (L) 8 - 16 mmol/L Final     eGFR if    Date Value Ref Range Status   03/12/2019 59.6 (A) >60 mL/min/1.73 m^2 Final     eGFR if non    Date Value Ref Range Status   03/12/2019 51.5 (A) >60 mL/min/1.73 m^2 Final     Comment:     Calculation used to obtain the estimated glomerular filtration  rate (eGFR) is the CKD-EPI equation.        Lab Results   Component Value Date    TSH 2.007 09/25/2018     Reviewed prior medical records including endoscopy history (see surgical history).    Objective:      Physical Exam   Constitutional: He is oriented to person, place, and time. He appears well-developed and well-nourished. No distress.   HENT:   Mouth/Throat: Oropharynx is clear and moist and mucous membranes are normal. No oral lesions. No oropharyngeal exudate.   Eyes: Pupils are equal, round, and reactive to light. Conjunctivae are normal. No scleral icterus.   Pulmonary/Chest: Effort normal and breath sounds normal. No respiratory distress. He has no wheezes.   Abdominal:  Soft. Normal appearance and bowel sounds are normal. He exhibits no distension, no abdominal bruit and no mass. There is no tenderness. There is no rigidity, no rebound, no guarding, no tenderness at McBurney's point and negative Short's sign.   Neurological: He is alert and oriented to person, place, and time.   Skin: Skin is warm and dry. No rash noted. He is not diaphoretic. No erythema. No pallor.   Non-jaundiced   Psychiatric: He has a normal mood and affect. His behavior is normal. Judgment and thought content normal.   Nursing note and vitals reviewed.      Assessment:       1. Diarrhea, unspecified type    2. Dyspepsia    3. Hx of colonic polyps    4. Intermittent constipation    5. Irregular bowel habits        Plan:       Diarrhea, unspecified type, Intermittent constipation, & Irregular bowel habits  -     Stool Exam-Ova,Cysts,Parasites; Future; Expected date: 05/29/2019  -     Fecal fat, qualitative; Future; Expected date: 05/29/2019  -     Giardia / Cryptosporidum, EIA; Future; Expected date: 05/29/2019  -     Occult blood x 1, stool; Future; Expected date: 05/29/2019  -     pH, stool; Future; Expected date: 05/29/2019  -     Rotavirus antigen, stool; Future; Expected date: 05/29/2019  -     WBC, Stool; Future; Expected date: 05/29/2019  -     Stool culture; Future; Expected date: 05/29/2019  -     Clostridium difficile EIA; Future; Expected date: 05/29/2019  -     Adenovirus Molecular Detection, PCR, Non-Blood Stool; Future; Expected date: 05/29/2019  - schedule Colonoscopy, discussed procedure with the patient, patient verbalized understanding  - recommended increase fiber in diet, especially soluble fiber since this can help bulk up the stool consistency and may help to slow down how fast the stool goes through the colon and can prevent diarrhea  Recommend high fiber diet (20-30 grams of fiber daily)/OTC fiber supplements daily as directed, SUCH AS METAMUCIL OR BENEFIBER.  - DISCONTINUE MIRALAX  -  CONTINUE OTC PROBIOTIC AS DIRECTED  - discussed with patient that certain medications, such as metformin, may be contributing to symptoms. I recommend follow-up with provider who manages medication to discuss about possible alternative therapy, patient verbalized understanding    Dyspepsia  -  START   omeprazole (PRILOSEC) 40 MG capsule; Take 1 capsule (40 mg total) by mouth before breakfast.  Dispense: 30 capsule; Refill: 1, - take in the morning 30-60 minutes before breakfast, discussed about possible long term use of medication (prefer to use lowest effective dose or discontinuing if possible) and discussed the risks & benefits with taking a reflux medication long term, and to take OTC calcium and vitamin d supplements as directed (such as Citracal +D), pt verbalized understanding  -discussed about the different types of medications used to treat reflux and how to use them, antacids can be used PRN for breakthrough heartburn symptoms by reducing stomach acid that is already produced, H2 blockers (zantac) work by limiting the amount acid production, & PPI's work to block acid production and are taken daily, patient verbalized understanding.  -Educated patient on lifestyle modifications to help control/reduce reflux/abdominal pain including: avoid large meals, avoid eating within 2-3 hours of bedtime (avoid late night eating & lying down soon after eating), elevate head of bed if nocturnal symptoms are present, smoking cessation (if current smoker), & weight loss (if overweight).   -Educated to avoid known foods which trigger reflux symptoms & to minimize/avoid high-fat foods, chocolate, caffeine, citrus, alcohol, & tomato products.  -Advised to avoid/limit use of NSAID's, since they can cause GI upset, bleeding, and/or ulcers. If needed, take with food.   - Possible EGD pending results of testing and if symptoms persist    Hx of colonic polyps  - schedule Colonoscopy, discussed procedure with the patient, patient  verbalized understanding    Follow up in about 1 month (around 6/29/2019), or if symptoms worsen or fail to improve.      If no improvement in symptoms or symptoms worsen, call/follow-up at clinic or go to ER.

## 2019-06-10 ENCOUNTER — TELEPHONE (OUTPATIENT)
Dept: GASTROENTEROLOGY | Facility: CLINIC | Age: 79
End: 2019-06-10

## 2019-06-10 NOTE — TELEPHONE ENCOUNTER
----- Message from Maritza Harrington sent at 6/10/2019  1:31 PM CDT -----  Contact: Karrie wife  Type: Needs Medical Advice    Who Called:  Karrie  Best Call Back Number: [phone]  Additional Information: Pt has a colonoscopy scheduled for 06/19/19 and he wants to cancel it. He decided he didn't want it anymore. Pls call to adv

## 2019-06-12 ENCOUNTER — PATIENT OUTREACH (OUTPATIENT)
Dept: ADMINISTRATIVE | Facility: HOSPITAL | Age: 79
End: 2019-06-12

## 2019-06-24 ENCOUNTER — LAB VISIT (OUTPATIENT)
Dept: LAB | Facility: HOSPITAL | Age: 79
End: 2019-06-24
Attending: FAMILY MEDICINE
Payer: MEDICARE

## 2019-06-24 DIAGNOSIS — E11.9 TYPE 2 DIABETES MELLITUS WITHOUT COMPLICATION, WITHOUT LONG-TERM CURRENT USE OF INSULIN: ICD-10-CM

## 2019-06-24 LAB
ALBUMIN SERPL BCP-MCNC: 3.8 G/DL (ref 3.5–5.2)
ALP SERPL-CCNC: 61 U/L (ref 55–135)
ALT SERPL W/O P-5'-P-CCNC: 16 U/L (ref 10–44)
ANION GAP SERPL CALC-SCNC: 8 MMOL/L (ref 8–16)
AST SERPL-CCNC: 15 U/L (ref 10–40)
BILIRUB SERPL-MCNC: 0.4 MG/DL (ref 0.1–1)
BUN SERPL-MCNC: 20 MG/DL (ref 8–23)
CALCIUM SERPL-MCNC: 9.5 MG/DL (ref 8.7–10.5)
CHLORIDE SERPL-SCNC: 106 MMOL/L (ref 95–110)
CHOLEST SERPL-MCNC: 186 MG/DL (ref 120–199)
CHOLEST/HDLC SERPL: 4.5 {RATIO} (ref 2–5)
CO2 SERPL-SCNC: 27 MMOL/L (ref 23–29)
CREAT SERPL-MCNC: 1.3 MG/DL (ref 0.5–1.4)
EST. GFR  (AFRICAN AMERICAN): 59.6 ML/MIN/1.73 M^2
EST. GFR  (NON AFRICAN AMERICAN): 51.5 ML/MIN/1.73 M^2
ESTIMATED AVG GLUCOSE: 148 MG/DL (ref 68–131)
GLUCOSE SERPL-MCNC: 155 MG/DL (ref 70–110)
HBA1C MFR BLD HPLC: 6.8 % (ref 4–5.6)
HDLC SERPL-MCNC: 41 MG/DL (ref 40–75)
HDLC SERPL: 22 % (ref 20–50)
LDLC SERPL CALC-MCNC: 119.6 MG/DL (ref 63–159)
NONHDLC SERPL-MCNC: 145 MG/DL
POTASSIUM SERPL-SCNC: 3.9 MMOL/L (ref 3.5–5.1)
PROT SERPL-MCNC: 6.8 G/DL (ref 6–8.4)
SODIUM SERPL-SCNC: 141 MMOL/L (ref 136–145)
TRIGL SERPL-MCNC: 127 MG/DL (ref 30–150)

## 2019-06-24 PROCEDURE — 80053 COMPREHEN METABOLIC PANEL: CPT

## 2019-06-24 PROCEDURE — 80061 LIPID PANEL: CPT

## 2019-06-24 PROCEDURE — 36415 COLL VENOUS BLD VENIPUNCTURE: CPT | Mod: PN

## 2019-06-24 PROCEDURE — 83036 HEMOGLOBIN GLYCOSYLATED A1C: CPT

## 2019-06-26 ENCOUNTER — OFFICE VISIT (OUTPATIENT)
Dept: FAMILY MEDICINE | Facility: CLINIC | Age: 79
End: 2019-06-26
Payer: MEDICARE

## 2019-06-26 VITALS
HEART RATE: 79 BPM | OXYGEN SATURATION: 97 % | HEIGHT: 68 IN | DIASTOLIC BLOOD PRESSURE: 60 MMHG | TEMPERATURE: 99 F | BODY MASS INDEX: 25.56 KG/M2 | RESPIRATION RATE: 16 BRPM | SYSTOLIC BLOOD PRESSURE: 118 MMHG | WEIGHT: 168.63 LBS

## 2019-06-26 DIAGNOSIS — E11.42 TYPE 2 DIABETES MELLITUS WITH PERIPHERAL NEUROPATHY: Primary | ICD-10-CM

## 2019-06-26 PROCEDURE — 99214 PR OFFICE/OUTPT VISIT, EST, LEVL IV, 30-39 MIN: ICD-10-PCS | Mod: S$GLB,,, | Performed by: FAMILY MEDICINE

## 2019-06-26 PROCEDURE — 99214 OFFICE O/P EST MOD 30 MIN: CPT | Mod: S$GLB,,, | Performed by: FAMILY MEDICINE

## 2019-06-26 PROCEDURE — 99999 PR PBB SHADOW E&M-EST. PATIENT-LVL III: CPT | Mod: PBBFAC,,, | Performed by: FAMILY MEDICINE

## 2019-06-26 PROCEDURE — 99999 PR PBB SHADOW E&M-EST. PATIENT-LVL III: ICD-10-PCS | Mod: PBBFAC,,, | Performed by: FAMILY MEDICINE

## 2019-06-26 PROCEDURE — 1101F PR PT FALLS ASSESS DOC 0-1 FALLS W/OUT INJ PAST YR: ICD-10-PCS | Mod: S$GLB,,, | Performed by: FAMILY MEDICINE

## 2019-06-26 PROCEDURE — 1101F PT FALLS ASSESS-DOCD LE1/YR: CPT | Mod: S$GLB,,, | Performed by: FAMILY MEDICINE

## 2019-06-26 NOTE — PROGRESS NOTES
THIS DOCUMENT WAS MADE IN PART WITH VOICE RECOGNITION SOFTWARE.  OCCASIONALLY THIS SOFTWARE WILL MISINTERPRET WORDS OR PHRASES.      Cecilio COUGHLIN Coy  1940    Cecilio COUGHLIN was seen today for diabetes and hyperlipidemia.    Diagnoses and all orders for this visit:    Type 2 diabetes mellitus with peripheral neuropathy  -     Hemoglobin A1c; Future  -     Basic metabolic panel; Future  Mild worsening of his diabetes, still technically satisfactory but I do not want the A1c to continue to increase.  Some of this is related to reducing the dosage of his metformin.  But I think there is room to improve his diet and exercise.  He reports that he is still having to take care of his wife who is recovering after a cardiac event.  Renal function is satisfactory to continue metformin, though getting to a point where it is going to be problematic so I begin reducing the dose in advance.  No additional medicines at this time unless this continues to worsen.    Hyperlipidemia  This chronic condition has improved    Subjective     Chief Complaint   Patient presents with    Diabetes     follow up    Hyperlipidemia     follow up       HPI  Diabetes, chronic condition, there has been mild worsening.  A.m. glucose readings remain in the 120s.  I suspect he is having larger postprandial elevations.  Recall that I reduce the dosage of his metformin several months ago because of age in gradually decreasing renal function, anticipating that at some point will need to stop this altogether.  He feels diet is adequate.  Activity has been diminished.  Still taking care of his wife who is recovering after a cardiac event.  Lipids are chronic, mildly improved.        Active Ambulatory Problems     Diagnosis Date Noted    Mixed hyperlipidemia 03/24/2014    Type 2 diabetes mellitus with neurologic complication 07/15/2016    Diabetic polyneuropathy associated with type 2 diabetes mellitus 01/30/2018    Heloma molle - Right Foot  09/11/2018    Hammer toe of right foot - Right Foot 09/11/2018    Pain in toe of right foot - Right Foot 09/11/2018    Type 2 diabetes mellitus with peripheral neuropathy 09/11/2018    PVD (peripheral vascular disease) 09/11/2018    SOB (shortness of breath) on exertion 10/15/2018    Stage 3 chronic kidney disease 12/12/2018    Decreased range of motion of lumbar spine 03/22/2019    Acute bilateral low back pain without sciatica 03/22/2019    Limitation of joint motion of lumbar spine 03/22/2019    Impairment of balance 03/22/2019    Gait abnormality 03/22/2019     Resolved Ambulatory Problems     Diagnosis Date Noted    Tobacco dependence 07/15/2016     Past Medical History:   Diagnosis Date    Colon polyp     Diabetes 2001    Diverticulosis     Hyperlipidemia     Stage 3 chronic kidney disease 12/12/2018    Type 2 diabetes mellitus          Review of Systems   Constitutional: Positive for activity change. Negative for fever and unexpected weight change.   Eyes: Negative for visual disturbance.   Respiratory: Negative.    Cardiovascular: Negative.    Gastrointestinal: Negative.  Negative for abdominal pain and blood in stool.   Endocrine: Negative for polydipsia and polyuria.   Genitourinary: Negative for dysuria and hematuria.   Musculoskeletal: Positive for arthralgias and back pain.   Neurological: Positive for numbness.       Objective     Physical Exam   Constitutional: He is oriented to person, place, and time. He appears well-developed and well-nourished. No distress.   HENT:   Head: Normocephalic and atraumatic.   Eyes: No scleral icterus.   Neck: Normal range of motion. Neck supple.   Cardiovascular: Normal rate, regular rhythm and normal heart sounds. Exam reveals no gallop.   No murmur heard.  Pulmonary/Chest: Effort normal and breath sounds normal. No respiratory distress.   Neurological: He is alert and oriented to person, place, and time. He has normal reflexes.   Skin: Skin is warm  "and dry. He is not diaphoretic.   Psychiatric: He has a normal mood and affect. His behavior is normal.   Vitals reviewed.    Vitals:    06/26/19 1100   BP: 118/60   Pulse: 79   Resp: 16   Temp: 98.7 °F (37.1 °C)   TempSrc: Oral   SpO2: 97%   Weight: 76.5 kg (168 lb 10.4 oz)   Height: 5' 8" (1.727 m)       MOST RECENT LABS IN OUR ELECTRONIC MEDICAL RECORD:     Results for orders placed or performed in visit on 06/24/19   Lipid panel   Result Value Ref Range    Cholesterol 186 120 - 199 mg/dL    Triglycerides 127 30 - 150 mg/dL    HDL 41 40 - 75 mg/dL    LDL Cholesterol 119.6 63.0 - 159.0 mg/dL    Hdl/Cholesterol Ratio 22.0 20.0 - 50.0 %    Total Cholesterol/HDL Ratio 4.5 2.0 - 5.0    Non-HDL Cholesterol 145 mg/dL   Hemoglobin A1c   Result Value Ref Range    Hemoglobin A1C 6.8 (H) 4.0 - 5.6 %    Estimated Avg Glucose 148 (H) 68 - 131 mg/dL   Comprehensive metabolic panel   Result Value Ref Range    Sodium 141 136 - 145 mmol/L    Potassium 3.9 3.5 - 5.1 mmol/L    Chloride 106 95 - 110 mmol/L    CO2 27 23 - 29 mmol/L    Glucose 155 (H) 70 - 110 mg/dL    BUN, Bld 20 8 - 23 mg/dL    Creatinine 1.3 0.5 - 1.4 mg/dL    Calcium 9.5 8.7 - 10.5 mg/dL    Total Protein 6.8 6.0 - 8.4 g/dL    Albumin 3.8 3.5 - 5.2 g/dL    Total Bilirubin 0.4 0.1 - 1.0 mg/dL    Alkaline Phosphatase 61 55 - 135 U/L    AST 15 10 - 40 U/L    ALT 16 10 - 44 U/L    Anion Gap 8 8 - 16 mmol/L    eGFR if African American 59.6 (A) >60 mL/min/1.73 m^2    eGFR if non  51.5 (A) >60 mL/min/1.73 m^2         "

## 2019-10-21 ENCOUNTER — LAB VISIT (OUTPATIENT)
Dept: LAB | Facility: HOSPITAL | Age: 79
End: 2019-10-21
Attending: FAMILY MEDICINE
Payer: MEDICARE

## 2019-10-21 DIAGNOSIS — E11.42 TYPE 2 DIABETES MELLITUS WITH PERIPHERAL NEUROPATHY: ICD-10-CM

## 2019-10-21 LAB
ANION GAP SERPL CALC-SCNC: 11 MMOL/L (ref 8–16)
BUN SERPL-MCNC: 17 MG/DL (ref 8–23)
CALCIUM SERPL-MCNC: 9.3 MG/DL (ref 8.7–10.5)
CHLORIDE SERPL-SCNC: 105 MMOL/L (ref 95–110)
CO2 SERPL-SCNC: 24 MMOL/L (ref 23–29)
CREAT SERPL-MCNC: 1.4 MG/DL (ref 0.5–1.4)
EST. GFR  (AFRICAN AMERICAN): 54.5 ML/MIN/1.73 M^2
EST. GFR  (NON AFRICAN AMERICAN): 47.1 ML/MIN/1.73 M^2
ESTIMATED AVG GLUCOSE: 186 MG/DL (ref 68–131)
GLUCOSE SERPL-MCNC: 187 MG/DL (ref 70–110)
HBA1C MFR BLD HPLC: 8.1 % (ref 4–5.6)
POTASSIUM SERPL-SCNC: 4.5 MMOL/L (ref 3.5–5.1)
SODIUM SERPL-SCNC: 140 MMOL/L (ref 136–145)

## 2019-10-21 PROCEDURE — 36415 COLL VENOUS BLD VENIPUNCTURE: CPT | Mod: PN

## 2019-10-21 PROCEDURE — 83036 HEMOGLOBIN GLYCOSYLATED A1C: CPT

## 2019-10-21 PROCEDURE — 80048 BASIC METABOLIC PNL TOTAL CA: CPT

## 2019-10-29 ENCOUNTER — TELEPHONE (OUTPATIENT)
Dept: FAMILY MEDICINE | Facility: CLINIC | Age: 79
End: 2019-10-29

## 2019-10-29 ENCOUNTER — OFFICE VISIT (OUTPATIENT)
Dept: FAMILY MEDICINE | Facility: CLINIC | Age: 79
End: 2019-10-29
Payer: MEDICARE

## 2019-10-29 VITALS
TEMPERATURE: 98 F | RESPIRATION RATE: 16 BRPM | SYSTOLIC BLOOD PRESSURE: 122 MMHG | BODY MASS INDEX: 25.26 KG/M2 | DIASTOLIC BLOOD PRESSURE: 76 MMHG | HEART RATE: 88 BPM | HEIGHT: 68 IN | WEIGHT: 166.69 LBS

## 2019-10-29 DIAGNOSIS — N18.30 STAGE 3 CHRONIC KIDNEY DISEASE: ICD-10-CM

## 2019-10-29 DIAGNOSIS — E11.42 TYPE 2 DIABETES MELLITUS WITH PERIPHERAL NEUROPATHY: Primary | ICD-10-CM

## 2019-10-29 DIAGNOSIS — E11.42 DIABETIC POLYNEUROPATHY ASSOCIATED WITH TYPE 2 DIABETES MELLITUS: ICD-10-CM

## 2019-10-29 DIAGNOSIS — E78.5 DYSLIPIDEMIA: ICD-10-CM

## 2019-10-29 DIAGNOSIS — R10.84 GENERALIZED ABDOMINAL PAIN: ICD-10-CM

## 2019-10-29 PROCEDURE — 99215 OFFICE O/P EST HI 40 MIN: CPT | Mod: 25,S$GLB,, | Performed by: FAMILY MEDICINE

## 2019-10-29 PROCEDURE — 90662 IIV NO PRSV INCREASED AG IM: CPT | Mod: S$GLB,,, | Performed by: FAMILY MEDICINE

## 2019-10-29 PROCEDURE — 1101F PR PT FALLS ASSESS DOC 0-1 FALLS W/OUT INJ PAST YR: ICD-10-PCS | Mod: S$GLB,,, | Performed by: FAMILY MEDICINE

## 2019-10-29 PROCEDURE — 1101F PT FALLS ASSESS-DOCD LE1/YR: CPT | Mod: S$GLB,,, | Performed by: FAMILY MEDICINE

## 2019-10-29 PROCEDURE — G0008 ADMIN INFLUENZA VIRUS VAC: HCPCS | Mod: S$GLB,,, | Performed by: FAMILY MEDICINE

## 2019-10-29 PROCEDURE — 90662 FLU VACCINE - HIGH DOSE (65+) PRESERVATIVE FREE IM: ICD-10-PCS | Mod: S$GLB,,, | Performed by: FAMILY MEDICINE

## 2019-10-29 PROCEDURE — 99999 PR PBB SHADOW E&M-EST. PATIENT-LVL III: CPT | Mod: PBBFAC,,, | Performed by: FAMILY MEDICINE

## 2019-10-29 PROCEDURE — 99215 PR OFFICE/OUTPT VISIT, EST, LEVL V, 40-54 MIN: ICD-10-PCS | Mod: 25,S$GLB,, | Performed by: FAMILY MEDICINE

## 2019-10-29 PROCEDURE — 99999 PR PBB SHADOW E&M-EST. PATIENT-LVL III: ICD-10-PCS | Mod: PBBFAC,,, | Performed by: FAMILY MEDICINE

## 2019-10-29 PROCEDURE — G0008 FLU VACCINE - HIGH DOSE (65+) PRESERVATIVE FREE IM: ICD-10-PCS | Mod: S$GLB,,, | Performed by: FAMILY MEDICINE

## 2019-10-29 NOTE — TELEPHONE ENCOUNTER
Spoke with pt, advise test strips were sent in for refill today  Advised if pharmacy does not have it to call back so we can resend rx.  Voiced understanding

## 2019-10-29 NOTE — PROGRESS NOTES
THIS DOCUMENT WAS MADE IN PART WITH VOICE RECOGNITION SOFTWARE.  OCCASIONALLY THIS SOFTWARE WILL MISINTERPRET WORDS OR PHRASES.      Cecilio Mezaaux  1940    Cecilio COUGHLIN was seen today for diabetes.    Diagnoses and all orders for this visit:    Type 2 diabetes mellitus with peripheral neuropathy  -     Hemoglobin A1c; Future  -     Comprehensive metabolic panel; Future  Worsening but he admits he has been not following a healthy diet and motivated to make changes.  Therefore continue current medications and follow back in 3 months    Diabetic polyneuropathy associated with type 2 diabetes mellitus  This is a chronic condition.  This condition has been reviewed and evaluated and it is currently stable.    Dyslipidemia  Mild improvement    Stage 3 chronic kidney disease  This is a chronic condition.  This condition has been reviewed and evaluated and it is currently stable.    Generalized abdominal pain  -     US Abdomen Complete; Future  Suspect constipation but with recent change and irregularity and a sensation of fullness and discomfort recommend an ultrasound.  I recommend taking MiraLax daily.  I would prefer that he not rely on Dulcolax but if nothing else works    Other orders  -     Cancel: ONETOUCH DELICA LANCETS 33 gauge Misc; USE TO TEST 1-2 TIMES A DAY  -     Influenza - High Dose (65+) (PF) (IM)  -     blood sugar diagnostic Strp; Dispense with lancets of choice to check bid    Total time greater than 45 min, including chart review, lab reviewed, med reconcilliation., more than 50% face-to-face counseling on a variety of topics  Patient's wife were present and took an active role in the counseling session with regard to diet and meal planning    Subjective     Chief Complaint   Patient presents with    Diabetes     follow up       HPI  A follow-up type 2 diabetes, worsening control, A1c now 8.1.  We did reduce the dose of metformin awhile back but also he has been stressed dizzy and having a  difficult time taking care of his wife which he feels has led to dietary changes and some loss of control.  Today 207 glucose    He does have diabetic neuropathy.  This is stable    Hyperlipidemia, mild, this has improved    Stomach feels 'hard', constipated at times  miralax prn, mild help dulcolax every 3 days helps    Active Ambulatory Problems     Diagnosis Date Noted    Mixed hyperlipidemia 03/24/2014    Type 2 diabetes mellitus with neurologic complication 07/15/2016    Diabetic polyneuropathy associated with type 2 diabetes mellitus 01/30/2018    Heloma molle - Right Foot 09/11/2018    Hammer toe of right foot - Right Foot 09/11/2018    Pain in toe of right foot - Right Foot 09/11/2018    Type 2 diabetes mellitus with peripheral neuropathy 09/11/2018    PVD (peripheral vascular disease) 09/11/2018    SOB (shortness of breath) on exertion 10/15/2018    Stage 3 chronic kidney disease 12/12/2018    Decreased range of motion of lumbar spine 03/22/2019    Acute bilateral low back pain without sciatica 03/22/2019    Limitation of joint motion of lumbar spine 03/22/2019    Impairment of balance 03/22/2019    Gait abnormality 03/22/2019     Resolved Ambulatory Problems     Diagnosis Date Noted    Tobacco dependence 07/15/2016     Past Medical History:   Diagnosis Date    Colon polyp     Diabetes 2001    Diverticulosis     Hyperlipidemia     Type 2 diabetes mellitus          Review of Systems   Constitutional: Positive for activity change. Negative for fever and unexpected weight change.   Eyes: Negative for visual disturbance.   Respiratory: Negative.  Negative for shortness of breath.    Cardiovascular: Negative.  Negative for chest pain.   Gastrointestinal: Negative.  Negative for abdominal pain and blood in stool.   Endocrine: Negative for polydipsia and polyuria.   Genitourinary: Negative for dysuria and hematuria.   Musculoskeletal: Positive for arthralgias and back pain.   Neurological:  "Positive for numbness.       Objective     Physical Exam   Constitutional: He is oriented to person, place, and time. He appears well-developed and well-nourished. No distress.   HENT:   Head: Normocephalic and atraumatic.   Eyes: No scleral icterus.   Neck: Normal range of motion. Neck supple.   Cardiovascular: Normal rate, regular rhythm and normal heart sounds. Exam reveals no gallop.   No murmur heard.  Pulmonary/Chest: Effort normal. No respiratory distress.   Abdominal:   Abdomen is tympanic, mild obesity.  No focal tenderness.  Bowel sounds are present but potentially hypoactive.  No obvious mass, no bruits   Neurological: He is alert and oriented to person, place, and time. He has normal reflexes.   Skin: Skin is warm and dry. He is not diaphoretic.   Psychiatric: He has a normal mood and affect. His behavior is normal.   Vitals reviewed.    Vitals:    10/29/19 0943   BP: 122/76   BP Location: Left arm   Patient Position: Sitting   BP Method: Large (Manual)   Pulse: 88   Resp: 16   Temp: 98.4 °F (36.9 °C)   TempSrc: Oral   Weight: 75.6 kg (166 lb 10.7 oz)   Height: 5' 8" (1.727 m)       MOST RECENT LABS IN OUR ELECTRONIC MEDICAL RECORD:     Results for orders placed or performed in visit on 10/21/19   Hemoglobin A1c   Result Value Ref Range    Hemoglobin A1C 8.1 (H) 4.0 - 5.6 %    Estimated Avg Glucose 186 (H) 68 - 131 mg/dL   Basic metabolic panel   Result Value Ref Range    Sodium 140 136 - 145 mmol/L    Potassium 4.5 3.5 - 5.1 mmol/L    Chloride 105 95 - 110 mmol/L    CO2 24 23 - 29 mmol/L    Glucose 187 (H) 70 - 110 mg/dL    BUN, Bld 17 8 - 23 mg/dL    Creatinine 1.4 0.5 - 1.4 mg/dL    Calcium 9.3 8.7 - 10.5 mg/dL    Anion Gap 11 8 - 16 mmol/L    eGFR if African American 54.5 (A) >60 mL/min/1.73 m^2    eGFR if non  47.1 (A) >60 mL/min/1.73 m^2         "

## 2019-10-29 NOTE — TELEPHONE ENCOUNTER
----- Message from Henry Leo sent at 10/29/2019  2:09 PM CDT -----  Contact: Karrie - Spouse  Type: Needs Medical Advice    Who Called:  Karrie  Pharmacy name and phone #:    [id number] [place] - [address]  [address]  [place] [address]  Phone: [phone] Fax: [phone]  Best Call Back Number: [phone]  Additional Information: Caller states that medication refill has not gone through. Please call to advise. Thanks!

## 2019-10-30 RX ORDER — LANCETS
EACH MISCELLANEOUS
Qty: 200 EACH | Refills: 3 | Status: SHIPPED | OUTPATIENT
Start: 2019-10-30 | End: 2020-12-30 | Stop reason: SDUPTHER

## 2019-10-30 NOTE — TELEPHONE ENCOUNTER
----- Message from Case Randhawa sent at 10/30/2019  8:56 AM CDT -----  Contact: Jerson Yoon  [phone]  Type: Needs Medical Advice    Who Called:  Wife  [phone]    Additional Information: Advised no lancet Rx was sent to the pharmacy yesterday. The only lancet on the chart have been discontinued.  Needs an alternative for One Touch lancets.  Please call to confirm and order.

## 2019-10-31 ENCOUNTER — TELEPHONE (OUTPATIENT)
Dept: FAMILY MEDICINE | Facility: CLINIC | Age: 79
End: 2019-10-31

## 2019-10-31 NOTE — TELEPHONE ENCOUNTER
Please see message and advise.  Pt is requesting a new rx of One Touch Delica Plus 33G lancets. Pharmacy asks to include diagnosis code   Last ov 10/29/19

## 2019-10-31 NOTE — TELEPHONE ENCOUNTER
----- Message from Marian Merritt sent at 10/31/2019  9:39 AM CDT -----  Contact: [facility]  [facility] calling about the e-script that was sent over for the pt test strips, lancets was sent with no diagnoses code and needs to be resent...  [id number] - [place] - [address]  [address]  [place] [address]  Phone: [phone] Fax: [phone]

## 2019-11-01 NOTE — TELEPHONE ENCOUNTER
----- Message from Tim Hubbard sent at 11/1/2019  2:36 PM CDT -----  Contact: Wife/Karrie Yoon called in and stated she missed a call back from office about the attached patient and trying to get his Rx's straightened out.    Karrie's call back number is [phone] ([phone])

## 2019-11-01 NOTE — TELEPHONE ENCOUNTER
Notified pt's wife that test strips and lancets have been sorted out. She expressed understanding.

## 2019-11-15 ENCOUNTER — HOSPITAL ENCOUNTER (OUTPATIENT)
Dept: RADIOLOGY | Facility: HOSPITAL | Age: 79
Discharge: HOME | End: 2019-11-15
Attending: FAMILY MEDICINE
Payer: MEDICARE

## 2019-11-15 DIAGNOSIS — R10.84 GENERALIZED ABDOMINAL PAIN: ICD-10-CM

## 2019-11-15 PROCEDURE — 76700 US ABDOMEN COMPLETE: ICD-10-PCS | Mod: 26,,, | Performed by: RADIOLOGY

## 2019-11-15 PROCEDURE — 76700 US EXAM ABDOM COMPLETE: CPT | Mod: TC,PO

## 2019-11-15 PROCEDURE — 76700 US EXAM ABDOM COMPLETE: CPT | Mod: 26,,, | Performed by: RADIOLOGY

## 2020-01-29 ENCOUNTER — LAB VISIT (OUTPATIENT)
Dept: LAB | Facility: HOSPITAL | Age: 80
End: 2020-01-29
Attending: FAMILY MEDICINE
Payer: MEDICARE

## 2020-01-29 DIAGNOSIS — E11.42 TYPE 2 DIABETES MELLITUS WITH PERIPHERAL NEUROPATHY: ICD-10-CM

## 2020-01-29 LAB
ALBUMIN SERPL BCP-MCNC: 4.1 G/DL (ref 3.5–5.2)
ALP SERPL-CCNC: 57 U/L (ref 55–135)
ALT SERPL W/O P-5'-P-CCNC: 20 U/L (ref 10–44)
ANION GAP SERPL CALC-SCNC: 7 MMOL/L (ref 8–16)
AST SERPL-CCNC: 17 U/L (ref 10–40)
BILIRUB SERPL-MCNC: 0.5 MG/DL (ref 0.1–1)
BUN SERPL-MCNC: 19 MG/DL (ref 8–23)
CALCIUM SERPL-MCNC: 9.7 MG/DL (ref 8.7–10.5)
CHLORIDE SERPL-SCNC: 106 MMOL/L (ref 95–110)
CO2 SERPL-SCNC: 28 MMOL/L (ref 23–29)
CREAT SERPL-MCNC: 1.4 MG/DL (ref 0.5–1.4)
EST. GFR  (AFRICAN AMERICAN): 54.5 ML/MIN/1.73 M^2
EST. GFR  (NON AFRICAN AMERICAN): 47.1 ML/MIN/1.73 M^2
GLUCOSE SERPL-MCNC: 125 MG/DL (ref 70–110)
POTASSIUM SERPL-SCNC: 4.4 MMOL/L (ref 3.5–5.1)
PROT SERPL-MCNC: 7.3 G/DL (ref 6–8.4)
SODIUM SERPL-SCNC: 141 MMOL/L (ref 136–145)

## 2020-01-29 PROCEDURE — 36415 COLL VENOUS BLD VENIPUNCTURE: CPT | Mod: PN

## 2020-01-29 PROCEDURE — 83036 HEMOGLOBIN GLYCOSYLATED A1C: CPT

## 2020-01-29 PROCEDURE — 80053 COMPREHEN METABOLIC PANEL: CPT

## 2020-01-30 LAB
ESTIMATED AVG GLUCOSE: 146 MG/DL (ref 68–131)
HBA1C MFR BLD HPLC: 6.7 % (ref 4–5.6)

## 2020-02-05 ENCOUNTER — OFFICE VISIT (OUTPATIENT)
Dept: FAMILY MEDICINE | Facility: CLINIC | Age: 80
End: 2020-02-05
Payer: MEDICARE

## 2020-02-05 ENCOUNTER — TELEPHONE (OUTPATIENT)
Dept: FAMILY MEDICINE | Facility: CLINIC | Age: 80
End: 2020-02-05

## 2020-02-05 VITALS
SYSTOLIC BLOOD PRESSURE: 110 MMHG | WEIGHT: 169.06 LBS | HEART RATE: 70 BPM | DIASTOLIC BLOOD PRESSURE: 62 MMHG | BODY MASS INDEX: 25.62 KG/M2 | RESPIRATION RATE: 18 BRPM | TEMPERATURE: 98 F | HEIGHT: 68 IN

## 2020-02-05 DIAGNOSIS — R26.89 BALANCE PROBLEM: ICD-10-CM

## 2020-02-05 DIAGNOSIS — E78.5 DYSLIPIDEMIA: ICD-10-CM

## 2020-02-05 DIAGNOSIS — K63.5 POLYP OF COLON, UNSPECIFIED PART OF COLON, UNSPECIFIED TYPE: ICD-10-CM

## 2020-02-05 DIAGNOSIS — E78.2 MIXED HYPERLIPIDEMIA: ICD-10-CM

## 2020-02-05 DIAGNOSIS — E11.42 DIABETIC POLYNEUROPATHY ASSOCIATED WITH TYPE 2 DIABETES MELLITUS: ICD-10-CM

## 2020-02-05 DIAGNOSIS — Z12.11 COLON CANCER SCREENING: ICD-10-CM

## 2020-02-05 DIAGNOSIS — E11.42 TYPE 2 DIABETES MELLITUS WITH PERIPHERAL NEUROPATHY: Primary | ICD-10-CM

## 2020-02-05 DIAGNOSIS — N18.30 STAGE 3 CHRONIC KIDNEY DISEASE: ICD-10-CM

## 2020-02-05 PROCEDURE — 1126F AMNT PAIN NOTED NONE PRSNT: CPT | Mod: S$GLB,,, | Performed by: FAMILY MEDICINE

## 2020-02-05 PROCEDURE — 82043 UR ALBUMIN QUANTITATIVE: CPT

## 2020-02-05 PROCEDURE — 99215 OFFICE O/P EST HI 40 MIN: CPT | Mod: S$GLB,,, | Performed by: FAMILY MEDICINE

## 2020-02-05 PROCEDURE — 99999 PR PBB SHADOW E&M-EST. PATIENT-LVL IV: CPT | Mod: PBBFAC,,, | Performed by: FAMILY MEDICINE

## 2020-02-05 PROCEDURE — 1126F PR PAIN SEVERITY QUANTIFIED, NO PAIN PRESENT: ICD-10-PCS | Mod: S$GLB,,, | Performed by: FAMILY MEDICINE

## 2020-02-05 PROCEDURE — 1159F PR MEDICATION LIST DOCUMENTED IN MEDICAL RECORD: ICD-10-PCS | Mod: S$GLB,,, | Performed by: FAMILY MEDICINE

## 2020-02-05 PROCEDURE — 1101F PT FALLS ASSESS-DOCD LE1/YR: CPT | Mod: S$GLB,,, | Performed by: FAMILY MEDICINE

## 2020-02-05 PROCEDURE — 1101F PR PT FALLS ASSESS DOC 0-1 FALLS W/OUT INJ PAST YR: ICD-10-PCS | Mod: S$GLB,,, | Performed by: FAMILY MEDICINE

## 2020-02-05 PROCEDURE — 1159F MED LIST DOCD IN RCRD: CPT | Mod: S$GLB,,, | Performed by: FAMILY MEDICINE

## 2020-02-05 PROCEDURE — 99999 PR PBB SHADOW E&M-EST. PATIENT-LVL IV: ICD-10-PCS | Mod: PBBFAC,,, | Performed by: FAMILY MEDICINE

## 2020-02-05 PROCEDURE — 99215 PR OFFICE/OUTPT VISIT, EST, LEVL V, 40-54 MIN: ICD-10-PCS | Mod: S$GLB,,, | Performed by: FAMILY MEDICINE

## 2020-02-05 NOTE — PROGRESS NOTES
THIS DOCUMENT WAS MADE IN PART WITH VOICE RECOGNITION SOFTWARE.  OCCASIONALLY THIS SOFTWARE WILL MISINTERPRET WORDS OR PHRASES.      Cecilio COUGHLIN Coy  1940    Cecilio COUGHLIN was seen today for follow-up.    Diagnoses and all orders for this visit:    Type 2 diabetes mellitus with peripheral neuropathy  -     Hemoglobin A1c; Future  -     Comprehensive metabolic panel; Future  -     Lipid panel; Future  -     Microalbumin/creatinine urine ratio; Future  Much improved, continue current medications, current diet and repeat in 4-5 months    He also states he had his diabetic eye exam last week and I should be receiving these results.  He goes to an external clinic, formally Dr. Chen but apparently he has passed away and he seeing someone else in that office but does not know her name.    Diabetic polyneuropathy associated with type 2 diabetes mellitus  neuropathic symptoms are stable    Dyslipidemia  Stable    Stage 3 chronic kidney disease  Chronic condition, stable and well controlled.  [  Mixed hyperlipidemia  Stable    Balance problem  -     Ambulatory referral/consult to Physical/Occupational Therapy; Future    Colon cancer screening  -     Case request GI: COLONOSCOPY    Polyp of colon, unspecified part of colon, unspecified type  -     Case request GI: COLONOSCOPY    A With persistent change in bowels and previous polyps I do recommend the colonoscopy even at his age.  The ultrasound did not show any obvious explanation for symptoms.  May need to consult with GI after colonoscopy if still not improving.  In the meantime he should take the MiraLax daily      Subjective     Chief Complaint   Patient presents with    Follow-up     diabetes       HPI    Follow-up type 2 diabetes, significant improvement in back into the satisfactory range after he fell off track 3 months ago.  Neuropathy is stable.  Lipids have been stable.  CKD is stable.    He does report still having some irregularity of bowels.  MiraLax helps  but he is not taking daily.  He also complains that his abdomen feels full but denies pain. I reviewed the results of an ultrasound that he had just a few months ago the did not show any acute or urgent finding for.  Reviewed his last colonoscopy which was in 2011, he did not return for follow-up as recommended by GI.  He did have several polyps.  He denies any blood in his stool.    He also reports a balance problem.  He denies any falls.  Occasionally he has mild vertigo when turning his head but mostly just feels off balance on his feet on a daily basis.  He is not aware of any asymmetry.  Occasional low back pain.  The if he has some chronic numbness in his feet but denies any change here or weakness in the legs.    Active Ambulatory Problems     Diagnosis Date Noted    Mixed hyperlipidemia 03/24/2014    Type 2 diabetes mellitus with neurologic complication 07/15/2016    Diabetic polyneuropathy associated with type 2 diabetes mellitus 01/30/2018    Heloma molle - Right Foot 09/11/2018    Hammer toe of right foot - Right Foot 09/11/2018    Pain in toe of right foot - Right Foot 09/11/2018    Type 2 diabetes mellitus with peripheral neuropathy 09/11/2018    PVD (peripheral vascular disease) 09/11/2018    SOB (shortness of breath) on exertion 10/15/2018    Stage 3 chronic kidney disease 12/12/2018    Decreased range of motion of lumbar spine 03/22/2019    Acute bilateral low back pain without sciatica 03/22/2019    Limitation of joint motion of lumbar spine 03/22/2019    Impairment of balance 03/22/2019    Gait abnormality 03/22/2019     Resolved Ambulatory Problems     Diagnosis Date Noted    Tobacco dependence 07/15/2016     Past Medical History:   Diagnosis Date    Colon polyp     Diabetes 2001    Diverticulosis     Hyperlipidemia     Type 2 diabetes mellitus      Current Outpatient Medications on File Prior to Visit   Medication Sig Dispense Refill    aspirin-sod bicarb-citric acid  (CHASE-SELTZER) 324 mg TbEF Take 325 mg by mouth every 6 (six) hours as needed.      bisacodyl (DULCOLAX) 5 mg EC tablet Take 5 mg by mouth daily as needed for Constipation.      blood sugar diagnostic Strp Dispense with lancets of choice to check bid  Dx code e11.42 200 each prn    glimepiride (AMARYL) 1 MG tablet TAKE 1 TABLET BEFORE BREAKFAST 90 tablet 3    Lactobac no.41/Bifidobact no.7 (PROBIOTIC-10 ORAL) Take by mouth once daily.      lancets Misc To check BG 2 times daily, to use with insurance preferred meter 200 each 3    metFORMIN (GLUCOPHAGE) 500 MG tablet One po bid 180 tablet 3    pravastatin (PRAVACHOL) 20 MG tablet Take 1 tablet (20 mg total) by mouth once daily. 90 tablet 3    sodium phosphates (FLEET PEDIATRIC) 9.5-3.5 gram/59 mL Enem Place 1 enema rectally as needed.      [DISCONTINUED] bismuth subsalicylate (BISMUTH) 262 mg Chew Take by mouth as needed.       [DISCONTINUED] ONETOUCH ULTRA2 kit USE TO TEST DAILY  0     No current facility-administered medications on file prior to visit.      Review of patient's allergies indicates:   Allergen Reactions    No known allergies      Social History     Tobacco Use    Smoking status: Former Smoker     Packs/day: 2.00     Years: 20.00     Pack years: 40.00     Types: Cigars     Last attempt to quit: 8/5/2016     Years since quitting: 3.5    Smokeless tobacco: Never Used    Tobacco comment: 2 cigars per day   Substance Use Topics    Alcohol use: Yes     Comment: occasionally- not on a weekly basis    Drug use: No     Family History   Problem Relation Age of Onset    Diabetes Father     Diabetes Brother     Cancer Mother         stomach    Celiac disease Neg Hx     Colon cancer Neg Hx     Crohn's disease Neg Hx     Ulcerative colitis Neg Hx          Review of Systems   Constitutional: Negative for fever and unexpected weight change.   Eyes: Positive for visual disturbance ( reports he is scheduled for cataract surgery).   Respiratory:  Negative.  Negative for shortness of breath.    Cardiovascular: Negative.  Negative for chest pain.   Gastrointestinal: Negative.  Negative for abdominal pain and blood in stool.   Endocrine: Negative for polydipsia and polyuria.   Genitourinary: Negative for dysuria and hematuria.   Musculoskeletal: Positive for arthralgias and back pain.   Neurological: Positive for numbness.       Objective     Physical Exam   Constitutional: He is oriented to person, place, and time. He appears well-developed and well-nourished. No distress.   HENT:   Head: Normocephalic and atraumatic.   Right Ear: External ear normal.   Left Ear: External ear normal.   Nose: Nose normal.   Mouth/Throat: Oropharynx is clear and moist. No oropharyngeal exudate.   Eyes: Conjunctivae are normal. Right eye exhibits no discharge. Left eye exhibits no discharge. No scleral icterus.   Neck: Normal range of motion. Neck supple.   Cardiovascular: Normal rate, regular rhythm and normal heart sounds. Exam reveals no gallop.   No murmur heard.  Pulses:       Dorsalis pedis pulses are 2+ on the right side, and 2+ on the left side.        Posterior tibial pulses are 1+ on the right side, and 1+ on the left side.   Pulmonary/Chest: Effort normal and breath sounds normal. No respiratory distress. He has no wheezes.   Abdominal:   abdomen soft, not distended, not tender.  There are no palpable masses.  Bowel sounds are present   Musculoskeletal:        Right foot: There is no deformity.        Left foot: There is no deformity.   Feet:   Right Foot:   Protective Sensation: 10 sites tested. 8 sites sensed.   Skin Integrity: Positive for callus and dry skin. Negative for ulcer or skin breakdown.   Left Foot:   Protective Sensation: 10 sites tested. 7 sites sensed.   Skin Integrity: Positive for callus and dry skin. Negative for ulcer or skin breakdown.   Neurological: He is alert and oriented to person, place, and time. He has normal reflexes. He displays no  "atrophy and no tremor. He exhibits normal muscle tone. He displays no seizure activity.   Reflex Scores:       Patellar reflexes are 2+ on the right side and 2+ on the left side.       Achilles reflexes are 2+ on the right side and 2+ on the left side.  His gait is mildly abnormal, mildly shuffling/correcting at times.  But no focal asymmetry or significant weakness noted a   Skin: Skin is warm and dry. He is not diaphoretic.   Psychiatric: He has a normal mood and affect. His behavior is normal.   Vitals reviewed.    Vitals:    02/05/20 1050   BP: 110/62   BP Location: Left arm   Patient Position: Sitting   BP Method: Medium (Manual)   Pulse: 70   Resp: 18   Temp: 98 °F (36.7 °C)   TempSrc: Oral   Weight: 76.7 kg (169 lb 1.5 oz)   Height: 5' 8" (1.727 m)       MOST RECENT LABS IN OUR ELECTRONIC MEDICAL RECORD:     Results for orders placed or performed in visit on 01/29/20   Hemoglobin A1c   Result Value Ref Range    Hemoglobin A1C 6.7 (H) 4.0 - 5.6 %    Estimated Avg Glucose 146 (H) 68 - 131 mg/dL   Comprehensive metabolic panel   Result Value Ref Range    Sodium 141 136 - 145 mmol/L    Potassium 4.4 3.5 - 5.1 mmol/L    Chloride 106 95 - 110 mmol/L    CO2 28 23 - 29 mmol/L    Glucose 125 (H) 70 - 110 mg/dL    BUN, Bld 19 8 - 23 mg/dL    Creatinine 1.4 0.5 - 1.4 mg/dL    Calcium 9.7 8.7 - 10.5 mg/dL    Total Protein 7.3 6.0 - 8.4 g/dL    Albumin 4.1 3.5 - 5.2 g/dL    Total Bilirubin 0.5 0.1 - 1.0 mg/dL    Alkaline Phosphatase 57 55 - 135 U/L    AST 17 10 - 40 U/L    ALT 20 10 - 44 U/L    Anion Gap 7 (L) 8 - 16 mmol/L    eGFR if African American 54.5 (A) >60 mL/min/1.73 m^2    eGFR if non  47.1 (A) >60 mL/min/1.73 m^2           "

## 2020-02-05 NOTE — TELEPHONE ENCOUNTER
----- Message from Debra Márquez sent at 2/5/2020 11:38 AM CST -----  Contact: self   Pt need a follow up in July and he only wants morning. Can he get something in the morning on July 8th or 9th please

## 2020-02-06 LAB
ALBUMIN/CREAT UR: 14.2 UG/MG (ref 0–30)
CREAT UR-MCNC: 254 MG/DL (ref 23–375)
MICROALBUMIN UR DL<=1MG/L-MCNC: 36 UG/ML

## 2020-02-28 ENCOUNTER — TELEPHONE (OUTPATIENT)
Dept: OTOLARYNGOLOGY | Facility: CLINIC | Age: 80
End: 2020-02-28

## 2020-02-28 NOTE — TELEPHONE ENCOUNTER
----- Message from Kirstin Anderson sent at 2/28/2020  8:40 AM CST -----  Contact: Patient's wife Karrie  Type:  Same Day Appointment Request    Caller is requesting a same day appointment.  Caller declined first available appointment listed below.      Name of Caller:  Karrie  When is the first available appointment?  3/3/20  Symptoms:  Ear pain and discomfort  Best Call Back Number:[phone]

## 2020-03-23 ENCOUNTER — DOCUMENTATION ONLY (OUTPATIENT)
Dept: REHABILITATION | Facility: HOSPITAL | Age: 80
End: 2020-03-23

## 2020-03-23 NOTE — PROGRESS NOTES
Outpatient Therapy Discharge Summary     Name: Cecilio Cespedes  Clinic Number: [id number]    Therapy Diagnosis:        Encounter Diagnoses   Name Primary?    Decreased range of motion of lumbar spine      Acute bilateral low back pain without sciatica      Limitation of joint motion of lumbar spine      Impairment of balance      Gait abnormality        Physician: Mary Shelby PA-C    Physician Orders: PT Eval and Treat  Medical Diagnosis: M54.5 (ICD-10-CM) - Acute bilateral low back pain without sciatica   Evaluation Date: 03/22/2019      Date of Last visit: 04/05/2019  Total Visits Received: 5  Cancelled Visits: 4 (following last visit due to improvement of symptoms)  No Show Visits: 0    Assessment    Goals:   Short Term GOALS:  In 4 weeks, pt. will  Report decreased back pain < / = 5 /10 to increase tolerance for walking long distances  Pt will report > / = 30% decrease in difficulty tolerating prolonged standing in order to improve ability to perform household chores.   Pt will be able to perform bridging x 5 without pain and clearing the gluts to improve standing tolerance   Pt to tolerate HEP to improve ROM and independence with ADL's        Long Term GOALS:  In 8 weeks, pt. Will:  Report decreased low back pain < / = 2 /10 to increase tolerance for walking  Pt will perform 5 sit <> stand transfers without UE support to increase independence and tolerance for activity   Increased lumbar flexion and extension ROM to 70% for increased ease with daily activities  - be independent with HEP and SX management       Discharge reason: Patient is now asymptomatic    Plan   This patient is discharged from Physical Therapy    Meena Gunter, PT

## 2020-04-22 ENCOUNTER — TELEPHONE (OUTPATIENT)
Dept: FAMILY MEDICINE | Facility: CLINIC | Age: 80
End: 2020-04-22

## 2020-04-22 NOTE — TELEPHONE ENCOUNTER
----- Message from Yosi Khan sent at 4/22/2020  9:21 AM CDT -----  Contact: pt's wife Karrie  Type: Needs Medical Advice    Who Called:  Karrie    Best Call Back Number: [phone]   Additional Information: Would like to discuss a treatment plan for the pt's dizzy episodes. Please call to advise.

## 2020-04-27 DIAGNOSIS — E11.9 TYPE 2 DIABETES MELLITUS WITHOUT COMPLICATION, WITHOUT LONG-TERM CURRENT USE OF INSULIN: ICD-10-CM

## 2020-04-27 DIAGNOSIS — E78.5 DYSLIPIDEMIA: ICD-10-CM

## 2020-04-27 RX ORDER — GLIMEPIRIDE 1 MG/1
TABLET ORAL
Qty: 90 TABLET | Refills: 3 | Status: SHIPPED | OUTPATIENT
Start: 2020-04-27 | End: 2021-02-20 | Stop reason: SDUPTHER

## 2020-04-27 RX ORDER — METFORMIN HYDROCHLORIDE 500 MG/1
TABLET ORAL
Qty: 180 TABLET | Refills: 3 | Status: SHIPPED | OUTPATIENT
Start: 2020-04-27 | End: 2020-08-31

## 2020-04-27 RX ORDER — PRAVASTATIN SODIUM 20 MG/1
20 TABLET ORAL DAILY
Qty: 90 TABLET | Refills: 3 | Status: SHIPPED | OUTPATIENT
Start: 2020-04-27 | End: 2021-01-02 | Stop reason: SDUPTHER

## 2020-04-27 NOTE — TELEPHONE ENCOUNTER
Pt Of [facility] MD    Last seen on: 2/5/2020  Next appt: 7/8/2020    Pharmacy:     [facility] - [address] [address]  Phone: [phone] Fax: [phone]    Please review! Thank you!

## 2020-05-06 ENCOUNTER — PATIENT MESSAGE (OUTPATIENT)
Dept: ADMINISTRATIVE | Facility: HOSPITAL | Age: 80
End: 2020-05-06

## 2020-05-27 ENCOUNTER — TELEPHONE (OUTPATIENT)
Dept: GASTROENTEROLOGY | Facility: CLINIC | Age: 80
End: 2020-05-27

## 2020-07-01 ENCOUNTER — LAB VISIT (OUTPATIENT)
Dept: LAB | Facility: HOSPITAL | Age: 80
End: 2020-07-01
Attending: FAMILY MEDICINE
Payer: MEDICARE

## 2020-07-01 DIAGNOSIS — E11.42 TYPE 2 DIABETES MELLITUS WITH PERIPHERAL NEUROPATHY: ICD-10-CM

## 2020-07-01 LAB
ALBUMIN SERPL BCP-MCNC: 3.9 G/DL (ref 3.5–5.2)
ALP SERPL-CCNC: 51 U/L (ref 55–135)
ALT SERPL W/O P-5'-P-CCNC: 17 U/L (ref 10–44)
ANION GAP SERPL CALC-SCNC: 10 MMOL/L (ref 8–16)
AST SERPL-CCNC: 17 U/L (ref 10–40)
BILIRUB SERPL-MCNC: 0.6 MG/DL (ref 0.1–1)
BUN SERPL-MCNC: 20 MG/DL (ref 8–23)
CALCIUM SERPL-MCNC: 9.3 MG/DL (ref 8.7–10.5)
CHLORIDE SERPL-SCNC: 105 MMOL/L (ref 95–110)
CHOLEST SERPL-MCNC: 158 MG/DL (ref 120–199)
CHOLEST/HDLC SERPL: 4.1 {RATIO} (ref 2–5)
CO2 SERPL-SCNC: 27 MMOL/L (ref 23–29)
CREAT SERPL-MCNC: 1.3 MG/DL (ref 0.5–1.4)
EST. GFR  (AFRICAN AMERICAN): 59.2 ML/MIN/1.73 M^2
EST. GFR  (NON AFRICAN AMERICAN): 51.2 ML/MIN/1.73 M^2
GLUCOSE SERPL-MCNC: 129 MG/DL (ref 70–110)
HDLC SERPL-MCNC: 39 MG/DL (ref 40–75)
HDLC SERPL: 24.7 % (ref 20–50)
LDLC SERPL CALC-MCNC: 99.2 MG/DL (ref 63–159)
NONHDLC SERPL-MCNC: 119 MG/DL
POTASSIUM SERPL-SCNC: 4 MMOL/L (ref 3.5–5.1)
PROT SERPL-MCNC: 6.9 G/DL (ref 6–8.4)
SODIUM SERPL-SCNC: 142 MMOL/L (ref 136–145)
TRIGL SERPL-MCNC: 99 MG/DL (ref 30–150)

## 2020-07-01 PROCEDURE — 80053 COMPREHEN METABOLIC PANEL: CPT

## 2020-07-01 PROCEDURE — 83036 HEMOGLOBIN GLYCOSYLATED A1C: CPT

## 2020-07-01 PROCEDURE — 80061 LIPID PANEL: CPT

## 2020-07-01 PROCEDURE — 36415 COLL VENOUS BLD VENIPUNCTURE: CPT | Mod: PN

## 2020-07-02 LAB
ESTIMATED AVG GLUCOSE: 131 MG/DL (ref 68–131)
HBA1C MFR BLD HPLC: 6.2 % (ref 4–5.6)

## 2020-07-08 ENCOUNTER — HOSPITAL ENCOUNTER (OUTPATIENT)
Dept: RADIOLOGY | Facility: HOSPITAL | Age: 80
Discharge: HOME | End: 2020-07-08
Attending: FAMILY MEDICINE
Payer: MEDICARE

## 2020-07-08 ENCOUNTER — TELEPHONE (OUTPATIENT)
Dept: FAMILY MEDICINE | Facility: CLINIC | Age: 80
End: 2020-07-08

## 2020-07-08 ENCOUNTER — OFFICE VISIT (OUTPATIENT)
Dept: FAMILY MEDICINE | Facility: CLINIC | Age: 80
End: 2020-07-08
Payer: MEDICARE

## 2020-07-08 VITALS
HEIGHT: 68 IN | DIASTOLIC BLOOD PRESSURE: 74 MMHG | WEIGHT: 166.75 LBS | SYSTOLIC BLOOD PRESSURE: 128 MMHG | TEMPERATURE: 98 F | RESPIRATION RATE: 18 BRPM | BODY MASS INDEX: 25.27 KG/M2 | HEART RATE: 74 BPM

## 2020-07-08 DIAGNOSIS — E11.42 DIABETIC POLYNEUROPATHY ASSOCIATED WITH TYPE 2 DIABETES MELLITUS: ICD-10-CM

## 2020-07-08 DIAGNOSIS — K59.00 CONSTIPATION, UNSPECIFIED CONSTIPATION TYPE: ICD-10-CM

## 2020-07-08 DIAGNOSIS — E78.5 DYSLIPIDEMIA: ICD-10-CM

## 2020-07-08 DIAGNOSIS — E11.42 TYPE 2 DIABETES MELLITUS WITH PERIPHERAL NEUROPATHY: Primary | ICD-10-CM

## 2020-07-08 DIAGNOSIS — E78.2 MIXED HYPERLIPIDEMIA: ICD-10-CM

## 2020-07-08 DIAGNOSIS — N18.30 STAGE 3 CHRONIC KIDNEY DISEASE: ICD-10-CM

## 2020-07-08 PROCEDURE — 1159F PR MEDICATION LIST DOCUMENTED IN MEDICAL RECORD: ICD-10-PCS | Mod: S$GLB,,, | Performed by: FAMILY MEDICINE

## 2020-07-08 PROCEDURE — 99215 OFFICE O/P EST HI 40 MIN: CPT | Mod: S$GLB,,, | Performed by: FAMILY MEDICINE

## 2020-07-08 PROCEDURE — 1126F PR PAIN SEVERITY QUANTIFIED, NO PAIN PRESENT: ICD-10-PCS | Mod: S$GLB,,, | Performed by: FAMILY MEDICINE

## 2020-07-08 PROCEDURE — 99215 PR OFFICE/OUTPT VISIT, EST, LEVL V, 40-54 MIN: ICD-10-PCS | Mod: S$GLB,,, | Performed by: FAMILY MEDICINE

## 2020-07-08 PROCEDURE — 74018 RADEX ABDOMEN 1 VIEW: CPT | Mod: 26,,, | Performed by: RADIOLOGY

## 2020-07-08 PROCEDURE — 99999 PR PBB SHADOW E&M-EST. PATIENT-LVL IV: CPT | Mod: PBBFAC,,, | Performed by: FAMILY MEDICINE

## 2020-07-08 PROCEDURE — 1159F MED LIST DOCD IN RCRD: CPT | Mod: S$GLB,,, | Performed by: FAMILY MEDICINE

## 2020-07-08 PROCEDURE — 74018 RADEX ABDOMEN 1 VIEW: CPT | Mod: TC,PN

## 2020-07-08 PROCEDURE — 99999 PR PBB SHADOW E&M-EST. PATIENT-LVL IV: ICD-10-PCS | Mod: PBBFAC,,, | Performed by: FAMILY MEDICINE

## 2020-07-08 PROCEDURE — 1101F PR PT FALLS ASSESS DOC 0-1 FALLS W/OUT INJ PAST YR: ICD-10-PCS | Mod: S$GLB,,, | Performed by: FAMILY MEDICINE

## 2020-07-08 PROCEDURE — 74018 XR ABDOMEN AP 1 VIEW: ICD-10-PCS | Mod: 26,,, | Performed by: RADIOLOGY

## 2020-07-08 PROCEDURE — 1101F PT FALLS ASSESS-DOCD LE1/YR: CPT | Mod: S$GLB,,, | Performed by: FAMILY MEDICINE

## 2020-07-08 PROCEDURE — 1126F AMNT PAIN NOTED NONE PRSNT: CPT | Mod: S$GLB,,, | Performed by: FAMILY MEDICINE

## 2020-07-08 NOTE — PROGRESS NOTES
THIS DOCUMENT WAS MADE IN PART WITH VOICE RECOGNITION SOFTWARE.  OCCASIONALLY THIS SOFTWARE WILL MISINTERPRET WORDS OR PHRASES.      Cecilio COUGHLIN Coy  1940    Cecilio COUGHLIN was seen today for diabetes.    Diagnoses and all orders for this visit:    Type 2 diabetes mellitus with peripheral neuropathy  Stable and well controlled    Diabetic polyneuropathy associated with type 2 diabetes mellitus  Stable    Dyslipidemia  Mixed hyperlipidemia  In improved    Stage 3 chronic kidney disease  Stable    Constipation, unspecified constipation type  -     X-Ray Abdomen AP 1 View; Future     The patient was here with his wife.  There is a lot going on.  Mostly GI symptoms both constipation mixed with diarrhea.  Some upper indigestion as well.  Had ultrasound about six months ago that did not show any obvious pathology.  He canceled his colonoscopy.  Declines any endoscopy.  Wife asked about upper and lower GI.  I do not know that this would be that helpful.  But will start a few things 1st.  Discontinue metformin, clean up diet.  Take MiraLax regularly.  KUB to see how much stool impaction/constipation he may have.  If no progress consult GI to discuss additional imaging and or endoscopy.    Balance issues.  On exam he has a mildly unsteady gait.  He has a small tremor but I do not see glaring signs to suggest parkinsonism.  We discussed the possibilities some physical therapy or even neurology consultation.  The wife is more interested in pursuing anything then the patient is himself.    Total time greater than 45 min, including chart review, lab reviewed, med reconcilliation., more than 50% face-to-face counseling on a variety of topics    Subjective     Chief Complaint   Patient presents with    Diabetes       HPI        Type 2 diabetes mellitus with peripheral neuropathy  Chronic condition, improving, satisfactory.  No hypoglycemia or other acute complications reported.    Diabetic polyneuropathy associated with type 2  diabetes mellitus  Stable.  Symptoms are manageable.  No open wounds or active foot problems.    Dyslipidemia  Stable    Stage 3 chronic kidney disease  Stable, no urinary symptoms.  No NSAID usage.    Balance problem, falls if not using cane    Can't sleep at night because of constipation and diarrhea  Dulcolax about 2 x week then diarrhea  miralax about 2 x week, not working    [employer] daily for 'settling stomach'      Active Ambulatory Problems     Diagnosis Date Noted    Mixed hyperlipidemia 03/24/2014    Type 2 diabetes mellitus with neurologic complication 07/15/2016    Diabetic polyneuropathy associated with type 2 diabetes mellitus 01/30/2018    Heloma molle - Right Foot 09/11/2018    Hammer toe of right foot - Right Foot 09/11/2018    Pain in toe of right foot - Right Foot 09/11/2018    Type 2 diabetes mellitus with peripheral neuropathy 09/11/2018    PVD (peripheral vascular disease) 09/11/2018    SOB (shortness of breath) on exertion 10/15/2018    Stage 3 chronic kidney disease 12/12/2018    Decreased range of motion of lumbar spine 03/22/2019    Acute bilateral low back pain without sciatica 03/22/2019    Limitation of joint motion of lumbar spine 03/22/2019    Impairment of balance 03/22/2019    Gait abnormality 03/22/2019     Resolved Ambulatory Problems     Diagnosis Date Noted    Tobacco dependence 07/15/2016     Past Medical History:   Diagnosis Date    Colon polyp     Diabetes 2001    Diverticulosis     Hyperlipidemia     Type 2 diabetes mellitus          Review of Systems   Constitutional: Positive for activity change and fatigue.   HENT: Negative.    Respiratory: Negative.    Cardiovascular: Negative for chest pain.   Gastrointestinal: Positive for abdominal pain, constipation and diarrhea.   Musculoskeletal: Positive for gait problem.   Skin: Negative.    Neurological: Positive for tremors and weakness.   Psychiatric/Behavioral: Positive for sleep disturbance.  "      Objective     Physical Exam  Vitals signs reviewed.   Constitutional:       General: He is not in acute distress.     Appearance: He is well-developed. He is not diaphoretic.   HENT:      Head: Normocephalic and atraumatic.   Eyes:      General: No scleral icterus.  Neck:      Musculoskeletal: Normal range of motion and neck supple.   Cardiovascular:      Rate and Rhythm: Normal rate and regular rhythm.      Heart sounds: Normal heart sounds. No murmur. No gallop.    Pulmonary:      Effort: Pulmonary effort is normal. No respiratory distress.   Abdominal:      Comments: Abdomen soft and flat.  Bowel sounds somewhat hypoactive.  No organomegaly.  No palpable masses   Skin:     General: Skin is warm and dry.   Neurological:      Mental Status: He is alert and oriented to person, place, and time.      Deep Tendon Reflexes: Reflexes are normal and symmetric.   Psychiatric:         Behavior: Behavior normal.       Vitals:    07/08/20 0926   Resp: 18   Temp: 98 °F (36.7 °C)   TempSrc: Temporal   Weight: 75.7 kg (166 lb 12.5 oz)   Height: 5' 8" (1.727 m)       MOST RECENT LABS IN OUR ELECTRONIC MEDICAL RECORD:     Results for orders placed or performed in visit on 07/01/20   Hemoglobin A1c   Result Value Ref Range    Hemoglobin A1C 6.2 (H) 4.0 - 5.6 %    Estimated Avg Glucose 131 68 - 131 mg/dL   Comprehensive metabolic panel   Result Value Ref Range    Sodium 142 136 - 145 mmol/L    Potassium 4.0 3.5 - 5.1 mmol/L    Chloride 105 95 - 110 mmol/L    CO2 27 23 - 29 mmol/L    Glucose 129 (H) 70 - 110 mg/dL    BUN, Bld 20 8 - 23 mg/dL    Creatinine 1.3 0.5 - 1.4 mg/dL    Calcium 9.3 8.7 - 10.5 mg/dL    Total Protein 6.9 6.0 - 8.4 g/dL    Albumin 3.9 3.5 - 5.2 g/dL    Total Bilirubin 0.6 0.1 - 1.0 mg/dL    Alkaline Phosphatase 51 (L) 55 - 135 U/L    AST 17 10 - 40 U/L    ALT 17 10 - 44 U/L    Anion Gap 10 8 - 16 mmol/L    eGFR if African American 59.2 (A) >60 mL/min/1.73 m^2    eGFR if non  51.2 (A) >60 " mL/min/1.73 m^2   Lipid panel   Result Value Ref Range    Cholesterol 158 120 - 199 mg/dL    Triglycerides 99 30 - 150 mg/dL    HDL 39 (L) 40 - 75 mg/dL    LDL Cholesterol 99.2 63.0 - 159.0 mg/dL    Hdl/Cholesterol Ratio 24.7 20.0 - 50.0 %    Total Cholesterol/HDL Ratio 4.1 2.0 - 5.0    Non-HDL Cholesterol 119 mg/dL

## 2020-07-08 NOTE — TELEPHONE ENCOUNTER
----- Message from Mairtza Harrington sent at 7/8/2020  4:06 PM CDT -----  Regarding: xray results  Contact: Cecilio  Type:  Test Results    Who Called:  Cecilio  Name of Test (Lab/Mammo/Etc):  xrays  Date of Test:  today  Ordering Provider:  Brett  Where the test was performed[facility]  Best Call Back Number:  [phone]  Additional Information:  Pls call pt back regarding his results

## 2020-08-18 ENCOUNTER — LAB VISIT (OUTPATIENT)
Dept: LAB | Facility: HOSPITAL | Age: 80
End: 2020-08-18
Attending: FAMILY MEDICINE
Payer: MEDICARE

## 2020-08-18 ENCOUNTER — OFFICE VISIT (OUTPATIENT)
Dept: FAMILY MEDICINE | Facility: CLINIC | Age: 80
End: 2020-08-18
Payer: MEDICARE

## 2020-08-18 VITALS
SYSTOLIC BLOOD PRESSURE: 126 MMHG | WEIGHT: 166.25 LBS | BODY MASS INDEX: 25.2 KG/M2 | HEART RATE: 80 BPM | HEIGHT: 68 IN | TEMPERATURE: 98 F | RESPIRATION RATE: 18 BRPM | DIASTOLIC BLOOD PRESSURE: 76 MMHG

## 2020-08-18 DIAGNOSIS — R19.8 CHANGE IN BOWEL FUNCTION: Primary | ICD-10-CM

## 2020-08-18 DIAGNOSIS — R19.8 CHANGE IN BOWEL FUNCTION: ICD-10-CM

## 2020-08-18 DIAGNOSIS — E11.42 TYPE 2 DIABETES MELLITUS WITH PERIPHERAL NEUROPATHY: ICD-10-CM

## 2020-08-18 DIAGNOSIS — N18.30 STAGE 3 CHRONIC KIDNEY DISEASE: ICD-10-CM

## 2020-08-18 PROCEDURE — 1159F PR MEDICATION LIST DOCUMENTED IN MEDICAL RECORD: ICD-10-PCS | Mod: S$GLB,,, | Performed by: FAMILY MEDICINE

## 2020-08-18 PROCEDURE — 99215 PR OFFICE/OUTPT VISIT, EST, LEVL V, 40-54 MIN: ICD-10-PCS | Mod: S$GLB,,, | Performed by: FAMILY MEDICINE

## 2020-08-18 PROCEDURE — 99215 OFFICE O/P EST HI 40 MIN: CPT | Mod: S$GLB,,, | Performed by: FAMILY MEDICINE

## 2020-08-18 PROCEDURE — 1126F PR PAIN SEVERITY QUANTIFIED, NO PAIN PRESENT: ICD-10-PCS | Mod: S$GLB,,, | Performed by: FAMILY MEDICINE

## 2020-08-18 PROCEDURE — 99999 PR PBB SHADOW E&M-EST. PATIENT-LVL V: CPT | Mod: PBBFAC,,, | Performed by: FAMILY MEDICINE

## 2020-08-18 PROCEDURE — 1101F PR PT FALLS ASSESS DOC 0-1 FALLS W/OUT INJ PAST YR: ICD-10-PCS | Mod: S$GLB,,, | Performed by: FAMILY MEDICINE

## 2020-08-18 PROCEDURE — 1126F AMNT PAIN NOTED NONE PRSNT: CPT | Mod: S$GLB,,, | Performed by: FAMILY MEDICINE

## 2020-08-18 PROCEDURE — 1159F MED LIST DOCD IN RCRD: CPT | Mod: S$GLB,,, | Performed by: FAMILY MEDICINE

## 2020-08-18 PROCEDURE — 99999 PR PBB SHADOW E&M-EST. PATIENT-LVL V: ICD-10-PCS | Mod: PBBFAC,,, | Performed by: FAMILY MEDICINE

## 2020-08-18 PROCEDURE — 36415 COLL VENOUS BLD VENIPUNCTURE: CPT | Mod: PN

## 2020-08-18 PROCEDURE — 1101F PT FALLS ASSESS-DOCD LE1/YR: CPT | Mod: S$GLB,,, | Performed by: FAMILY MEDICINE

## 2020-08-18 PROCEDURE — 84443 ASSAY THYROID STIM HORMONE: CPT

## 2020-08-18 RX ORDER — HYDROCODONE BITARTRATE AND ACETAMINOPHEN 10; 325 MG/1; MG/1
TABLET ORAL
COMMUNITY
Start: 2020-05-20 | End: 2020-08-31

## 2020-08-18 NOTE — PROGRESS NOTES
THIS DOCUMENT WAS MADE IN PART WITH VOICE RECOGNITION SOFTWARE.  OCCASIONALLY THIS SOFTWARE WILL MISINTERPRET WORDS OR PHRASES.      Cecilio COUGHLIN Coy  1940    Cecilio COUGHLIN was seen today for type 2 diabetes mellitus with peripheral neuropathy.    Diagnoses and all orders for this visit:    Change in bowel function  -     TSH; Future  -     Ambulatory referral/consult to Gastroenterology; Future    Type 2 diabetes mellitus with peripheral neuropathy    Stage 3 chronic kidney disease     Detailed discussion today.  Most of the visit was counseling, total time 40+ minutes.  I recommended consistency with fluid and fiber intake and continue Miralax.  Note he has also been taking a probiotic daily without noticeable improvement .  Discussed getting a colonoscopy is rather and then let but his wife prefers to be more proactive.  So we compromised on scheduling direct consultation with Gastroenterology to evaluate.    Subjective     Chief Complaint   Patient presents with    Type 2 diabetes mellitus with peripheral neuropathy       HPI    Follow-up recent concerned about constipation.  He returns with his wife.  He reports no significant change although after detailed questioning perhaps mild improvement.  He describes persistent constipation typically having a bowel movement every few days gradually worsening until he has to take Dulcolax about once a week after which she has of successful bowel movement but followed by diarrhea for a day.  He has some bloating and fullness but no pain.  No blood in his stool.  He had previously had a colonoscopy scheduled but canceled this with recent concerns about COVID.    Advised increased fluid intake and try fiber, minimal help, advised him to begin MiraLax and he says he has been taking this most of the time.  But has not had consistent improvement.    His wife asked about prescription medications.    His diabetes and chronic renal insuff have been stable.      Active  Ambulatory Problems     Diagnosis Date Noted    Mixed hyperlipidemia 03/24/2014    Type 2 diabetes mellitus with neurologic complication 07/15/2016    Diabetic polyneuropathy associated with type 2 diabetes mellitus 01/30/2018    Heloma molle - Right Foot 09/11/2018    Hammer toe of right foot - Right Foot 09/11/2018    Pain in toe of right foot - Right Foot 09/11/2018    Type 2 diabetes mellitus with peripheral neuropathy 09/11/2018    PVD (peripheral vascular disease) 09/11/2018    SOB (shortness of breath) on exertion 10/15/2018    Stage 3 chronic kidney disease 12/12/2018    Decreased range of motion of lumbar spine 03/22/2019    Acute bilateral low back pain without sciatica 03/22/2019    Limitation of joint motion of lumbar spine 03/22/2019    Impairment of balance 03/22/2019    Gait abnormality 03/22/2019     Resolved Ambulatory Problems     Diagnosis Date Noted    Tobacco dependence 07/15/2016     Past Medical History:   Diagnosis Date    Colon polyp     Diabetes 2001    Diverticulosis     Hyperlipidemia     Type 2 diabetes mellitus          Review of Systems   Constitutional: Positive for activity change. Negative for fever and unexpected weight change.   HENT: Negative for trouble swallowing.    Respiratory: Negative for shortness of breath.    Cardiovascular: Negative for chest pain.   Gastrointestinal: Positive for constipation and diarrhea. Negative for blood in stool, nausea and vomiting.   Genitourinary: Negative for difficulty urinating and hematuria.       Objective     Physical Exam  Vitals signs reviewed.   Constitutional:       General: He is not in acute distress.     Appearance: He is well-developed. He is not diaphoretic.   HENT:      Head: Normocephalic and atraumatic.   Eyes:      General: No scleral icterus.  Neck:      Musculoskeletal: Normal range of motion and neck supple.   Cardiovascular:      Rate and Rhythm: Normal rate and regular rhythm.      Heart sounds:  "Normal heart sounds. No murmur. No gallop.    Pulmonary:      Effort: Pulmonary effort is normal. No respiratory distress.   Abdominal:      General: Bowel sounds are normal. There is no distension.      Tenderness: There is no abdominal tenderness.   Skin:     General: Skin is warm and dry.   Neurological:      Mental Status: He is alert and oriented to person, place, and time.      Deep Tendon Reflexes: Reflexes are normal and symmetric.   Psychiatric:         Behavior: Behavior normal.       Vitals:    08/18/20 1039   BP: 126/76   BP Location: Left arm   Patient Position: Sitting   BP Method: Medium (Manual)   Pulse: 80   Resp: 18   Temp: 97.8 °F (36.6 °C)   TempSrc: Temporal   Weight: 75.4 kg (166 lb 3.6 oz)   Height: 5' 8" (1.727 m)       MOST RECENT LABS IN OUR ELECTRONIC MEDICAL RECORD:     Results for orders placed or performed in visit on 07/01/20   Hemoglobin A1c   Result Value Ref Range    Hemoglobin A1C 6.2 (H) 4.0 - 5.6 %    Estimated Avg Glucose 131 68 - 131 mg/dL   Comprehensive metabolic panel   Result Value Ref Range    Sodium 142 136 - 145 mmol/L    Potassium 4.0 3.5 - 5.1 mmol/L    Chloride 105 95 - 110 mmol/L    CO2 27 23 - 29 mmol/L    Glucose 129 (H) 70 - 110 mg/dL    BUN, Bld 20 8 - 23 mg/dL    Creatinine 1.3 0.5 - 1.4 mg/dL    Calcium 9.3 8.7 - 10.5 mg/dL    Total Protein 6.9 6.0 - 8.4 g/dL    Albumin 3.9 3.5 - 5.2 g/dL    Total Bilirubin 0.6 0.1 - 1.0 mg/dL    Alkaline Phosphatase 51 (L) 55 - 135 U/L    AST 17 10 - 40 U/L    ALT 17 10 - 44 U/L    Anion Gap 10 8 - 16 mmol/L    eGFR if African American 59.2 (A) >60 mL/min/1.73 m^2    eGFR if non  51.2 (A) >60 mL/min/1.73 m^2   Lipid panel   Result Value Ref Range    Cholesterol 158 120 - 199 mg/dL    Triglycerides 99 30 - 150 mg/dL    HDL 39 (L) 40 - 75 mg/dL    LDL Cholesterol 99.2 63.0 - 159.0 mg/dL    Hdl/Cholesterol Ratio 24.7 20.0 - 50.0 %    Total Cholesterol/HDL Ratio 4.1 2.0 - 5.0    Non-HDL Cholesterol 119 mg/dL "

## 2020-08-19 ENCOUNTER — PATIENT OUTREACH (OUTPATIENT)
Dept: ADMINISTRATIVE | Facility: OTHER | Age: 80
End: 2020-08-19

## 2020-08-19 LAB — TSH SERPL DL<=0.005 MIU/L-ACNC: 2.23 UIU/ML (ref 0.4–4)

## 2020-08-19 NOTE — PROGRESS NOTES
Health Maintenance Due   Topic Date Due    Shingles Vaccine (2 of 3) 09/09/2016    Pneumococcal Vaccine (65+ Low/Medium Risk) (2 of 2 - PPSV23) 07/15/2017    Eye Exam  11/20/2019     Updates were requested from care everywhere.  Chart was reviewed for overdue Proactive [facility] Encounters ([facility]UL[facility]) topics (CRS, Breast Cancer Screening, Eye exam)  Health Maintenance has been updated.  LINKS immunization registry triggered.  Immunizations were reconciled.

## 2020-08-21 ENCOUNTER — OFFICE VISIT (OUTPATIENT)
Dept: GASTROENTEROLOGY | Facility: CLINIC | Age: 80
End: 2020-08-21
Payer: MEDICARE

## 2020-08-21 VITALS — BODY MASS INDEX: 25.33 KG/M2 | WEIGHT: 167.13 LBS | HEIGHT: 68 IN

## 2020-08-21 DIAGNOSIS — Z86.010 HISTORY OF COLON POLYPS: ICD-10-CM

## 2020-08-21 DIAGNOSIS — Z01.818 PREOP TESTING: ICD-10-CM

## 2020-08-21 DIAGNOSIS — K59.09 CHRONIC CONSTIPATION: ICD-10-CM

## 2020-08-21 DIAGNOSIS — Z87.19 HISTORY OF GASTROESOPHAGEAL REFLUX (GERD): ICD-10-CM

## 2020-08-21 DIAGNOSIS — R19.8 CHANGE IN BOWEL FUNCTION: Primary | ICD-10-CM

## 2020-08-21 PROCEDURE — 1126F AMNT PAIN NOTED NONE PRSNT: CPT | Mod: S$GLB,,, | Performed by: NURSE PRACTITIONER

## 2020-08-21 PROCEDURE — 1159F MED LIST DOCD IN RCRD: CPT | Mod: S$GLB,,, | Performed by: NURSE PRACTITIONER

## 2020-08-21 PROCEDURE — 99999 PR PBB SHADOW E&M-EST. PATIENT-LVL IV: ICD-10-PCS | Mod: PBBFAC,,, | Performed by: NURSE PRACTITIONER

## 2020-08-21 PROCEDURE — 1101F PT FALLS ASSESS-DOCD LE1/YR: CPT | Mod: S$GLB,,, | Performed by: NURSE PRACTITIONER

## 2020-08-21 PROCEDURE — 1126F PR PAIN SEVERITY QUANTIFIED, NO PAIN PRESENT: ICD-10-PCS | Mod: S$GLB,,, | Performed by: NURSE PRACTITIONER

## 2020-08-21 PROCEDURE — 1159F PR MEDICATION LIST DOCUMENTED IN MEDICAL RECORD: ICD-10-PCS | Mod: S$GLB,,, | Performed by: NURSE PRACTITIONER

## 2020-08-21 PROCEDURE — 1101F PR PT FALLS ASSESS DOC 0-1 FALLS W/OUT INJ PAST YR: ICD-10-PCS | Mod: S$GLB,,, | Performed by: NURSE PRACTITIONER

## 2020-08-21 PROCEDURE — 99214 PR OFFICE/OUTPT VISIT, EST, LEVL IV, 30-39 MIN: ICD-10-PCS | Mod: S$GLB,,, | Performed by: NURSE PRACTITIONER

## 2020-08-21 PROCEDURE — 99999 PR PBB SHADOW E&M-EST. PATIENT-LVL IV: CPT | Mod: PBBFAC,,, | Performed by: NURSE PRACTITIONER

## 2020-08-21 PROCEDURE — 99214 OFFICE O/P EST MOD 30 MIN: CPT | Mod: S$GLB,,, | Performed by: NURSE PRACTITIONER

## 2020-08-21 NOTE — LETTER
August 21, 2020      Mason West MD  [address]  [facility] [address]           [place] [facility] [address] [place] [address]  Phone: [phone]          Patient: Cecilio Cespedes   MR Number: [id number]   YOB: 1940   Date of Visit: 8/21/2020       Dear Dr. Mason West:    Thank you for referring Cecilio Cespedes to me for evaluation. Attached you will find relevant portions of my assessment and plan of care.    If you have questions, please do not hesitate to call me. I look forward to following Cecilio Cespedes along with you.    Sincerely,    Scarlet Torres, [facility]    Enclosure  CC:  No Recipients    If you would like to receive this communication electronically, please contact [email or web address] or [phone] to request more information on [facility] Link access.    For providers and/or their staff who would like to refer a patient to [facility], please contact us through our one-stop-shop provider referral line, [facility] , at [phone].    If you feel you have received this communication in error or would no longer like to receive these types of communications, please e-mail [email or web address]

## 2020-08-21 NOTE — PROGRESS NOTES
Subjective:       Patient ID: Cecilio Cespedes is a 80 y.o. male Body mass index is 25.41 kg/m².    Chief Complaint: Change in bowel function    Established patient of Dr. Dejesus & myself.  Referred by Dr. West for change in bowel function.    Patient is here with his wife, whom assisted with history.    GI Problem  The primary symptoms include diarrhea (only occurs occasionally after laxative use). Primary symptoms do not include fever, weight loss, fatigue, abdominal pain, nausea, vomiting, melena, hematemesis, hematochezia or dysuria. The illness began more than 7 days ago (started ~11/2018 with change in bowel habits with constipation and diarrhea; diarrhea has resolved and now persistent constipation).   The illness is also significant for bloating and constipation (bowel movements once every 2 days). The illness does not include chills, dysphagia or odynophagia. Associated symptoms comments: OTC probiotic daily; dulcolax prn-takes maybe once a week, fleet's enema prn  PAST TREATMENT: miralax- minimal relief. Significant associated medical issues include GERD (described as indigestion; occurs 0-1 times a week, takes lisa-seltzer prn for it; PAST TREATMENT: omeprazole- patient doesn't remember taking this).     Review of Systems   Constitutional: Negative for appetite change, chills, fatigue, fever and weight loss.        Patient reports he does not take norco; it was prescribed after a surgery and never took it   HENT: Negative for sore throat and trouble swallowing.    Respiratory: Negative for cough, choking and shortness of breath.    Cardiovascular: Negative for chest pain.   Gastrointestinal: Positive for bloating, constipation (bowel movements once every 2 days) and diarrhea (only occurs occasionally after laxative use). Negative for abdominal pain, anal bleeding, blood in stool, dysphagia, hematemesis, hematochezia, melena, nausea, rectal pain and vomiting.   Genitourinary: Negative for  difficulty urinating, dysuria and flank pain.   Neurological: Negative for weakness.       Past Medical History:   Diagnosis Date    Colon polyp     Diabetes 2001    Diverticulosis     Hyperlipidemia     Stage 3 chronic kidney disease 12/12/2018    Type 2 diabetes mellitus      Past Surgical History:   Procedure Laterality Date    COLONOSCOPY  03/14/2011    Dr. Dejesus: 2 colon polyps removed (one was 10 mm), diverticulosis, hemorrhoids, repeat in 3 years for surveillance; biopsy: FRAGMENTS OF TUBULAR ADENOMA.    HERNIA REPAIR       Family History   Problem Relation Age of Onset    Diabetes Father     Diabetes Brother     Cancer Mother         stomach    Celiac disease Neg Hx     Colon cancer Neg Hx     Crohn's disease Neg Hx     Ulcerative colitis Neg Hx     Stomach cancer Neg Hx     Esophageal cancer Neg Hx      Wt Readings from Last 10 Encounters:   08/21/20 75.8 kg (167 lb 1.7 oz)   08/18/20 75.4 kg (166 lb 3.6 oz)   07/08/20 75.7 kg (166 lb 12.5 oz)   02/05/20 76.7 kg (169 lb 1.5 oz)   10/29/19 75.6 kg (166 lb 10.7 oz)   06/26/19 76.5 kg (168 lb 10.4 oz)   05/29/19 76.5 kg (168 lb 10.4 oz)   03/19/19 75.2 kg (165 lb 12.6 oz)   03/14/19 75.8 kg (167 lb 1.7 oz)   02/05/19 76.7 kg (169 lb)     Lab Results   Component Value Date    WBC 8.34 09/25/2018    HGB 14.3 09/25/2018    HCT 43.1 09/25/2018    MCV 92 09/25/2018     09/25/2018     CMP  Sodium   Date Value Ref Range Status   07/01/2020 142 136 - 145 mmol/L Final     Potassium   Date Value Ref Range Status   07/01/2020 4.0 3.5 - 5.1 mmol/L Final     Chloride   Date Value Ref Range Status   07/01/2020 105 95 - 110 mmol/L Final     CO2   Date Value Ref Range Status   07/01/2020 27 23 - 29 mmol/L Final     Glucose   Date Value Ref Range Status   07/01/2020 129 (H) 70 - 110 mg/dL Final     BUN, Bld   Date Value Ref Range Status   07/01/2020 20 8 - 23 mg/dL Final     Creatinine   Date Value Ref Range Status   07/01/2020 1.3 0.5 - 1.4 mg/dL  Final     Calcium   Date Value Ref Range Status   07/01/2020 9.3 8.7 - 10.5 mg/dL Final     Total Protein   Date Value Ref Range Status   07/01/2020 6.9 6.0 - 8.4 g/dL Final     Albumin   Date Value Ref Range Status   07/01/2020 3.9 3.5 - 5.2 g/dL Final     Total Bilirubin   Date Value Ref Range Status   07/01/2020 0.6 0.1 - 1.0 mg/dL Final     Comment:     For infants and newborns, interpretation of results should be based  on gestational age, weight and in agreement with clinical  observations.  Premature Infant recommended reference ranges:  Up to 24 hours.............<8.0 mg/dL  Up to 48 hours............<12.0 mg/dL  3-5 days..................<15.0 mg/dL  6-29 days.................<15.0 mg/dL       Alkaline Phosphatase   Date Value Ref Range Status   07/01/2020 51 (L) 55 - 135 U/L Final     AST   Date Value Ref Range Status   07/01/2020 17 10 - 40 U/L Final     ALT   Date Value Ref Range Status   07/01/2020 17 10 - 44 U/L Final     Anion Gap   Date Value Ref Range Status   07/01/2020 10 8 - 16 mmol/L Final     eGFR if    Date Value Ref Range Status   07/01/2020 59.2 (A) >60 mL/min/1.73 m^2 Final     eGFR if non    Date Value Ref Range Status   07/01/2020 51.2 (A) >60 mL/min/1.73 m^2 Final     Comment:     Calculation used to obtain the estimated glomerular filtration  rate (eGFR) is the CKD-EPI equation.        Lab Results   Component Value Date    TSH 2.228 08/18/2020     Reviewed prior medical records including radiology report of 7/8/2020 abdominal x-ray; 11/15/2019 abdominal ultrasound; & endoscopy history (see surgical history).    Objective:      Physical Exam  Vitals signs and nursing note reviewed.   Constitutional:       General: He is not in acute distress.     Appearance: Normal appearance. He is well-developed. He is not diaphoretic.   HENT:      Mouth/Throat:      Comments: Patient is wearing a face mask, which covers patient's mouth and nose, due to COVID 19  concerns.  Eyes:      General: No scleral icterus.     Conjunctiva/sclera: Conjunctivae normal.      Pupils: Pupils are equal, round, and reactive to light.   Pulmonary:      Effort: Pulmonary effort is normal. No respiratory distress.      Breath sounds: Normal breath sounds. No wheezing.   Abdominal:      General: Bowel sounds are normal. There is no distension or abdominal bruit.      Palpations: Abdomen is soft. Abdomen is not rigid. There is no mass.      Tenderness: There is no abdominal tenderness. There is no guarding or rebound. Negative signs include Short's sign and McBurney's sign.   Skin:     General: Skin is warm and dry.      Coloration: Skin is not pale.      Findings: No erythema or rash.      Comments: Non-jaundiced   Neurological:      Mental Status: He is alert and oriented to person, place, and time.   Psychiatric:         Behavior: Behavior normal.         Thought Content: Thought content normal.         Judgment: Judgment normal.         Assessment:       1. Change in bowel function    2. History of colon polyps    3. Chronic constipation    4. History of gastroesophageal reflux (GERD)        Plan:       Change in bowel function & Chronic constipation  -  START   linaCLOtide (LINZESS) 72 mcg Cap capsule; Take 1 capsule (72 mcg total) by mouth before breakfast. Takes > 30 minutes before 1st meal of the day  Dispense: 30 capsule; Refill: 1  - schedule Colonoscopy, discussed procedure with the patient, including risks and benefits, patient verbalized understanding  Recommend high fiber diet (20-30 grams of fiber daily)/OTC fiber supplements daily as directed, SUCH AS METAMUCIL OR BENEFIBER.  - CONTINUE OTC PROBIOTIC AS DIRECTED    Hx of colonic polyps  - schedule Colonoscopy, discussed procedure with the patient, patient verbalized understanding    History of gastroesophageal reflux (GERD)  -discussed about the different types of medications used to treat reflux, patient verbalized understanding  & patient declines starting an acid-reducing medication at this time.  -Educated patient on lifestyle modifications to help control/reduce reflux/abdominal pain including: avoid large meals, avoid eating within 2-3 hours of bedtime (avoid late night eating & lying down soon after eating), elevate head of bed if nocturnal symptoms are present, smoking cessation (if current smoker), & weight loss (if overweight).   -Educated to avoid known foods which trigger reflux symptoms & to minimize/avoid high-fat foods, chocolate, caffeine, citrus, alcohol, & tomato products.  -Advised to avoid/limit use of NSAID's, since they can cause GI upset, bleeding, and/or ulcers. If needed, take with food.   - Possible EGD pending results of testing and if symptoms persist    Follow up in about 1 month (around 9/21/2020), or if symptoms worsen or fail to improve.      If no improvement in symptoms or symptoms worsen, call/follow-up at clinic or go to ER.

## 2020-08-21 NOTE — PATIENT INSTRUCTIONS
Eating a High-Fiber Diet  Fiber is what gives strength and structure to plants. Most grains, beans, vegetables, and fruits contain fiber. Foods rich in fiber are often low in calories and fat, and they fill you up more. They may also reduce your risks for certain health problems. To find out the amount of fiber in canned, packaged, or frozen foods, read the Nutrition Facts label. It tells you how much fiber is in a serving.    Types of fiber and their benefits  There are two types of fiber: insoluble and soluble. They both aid digestion and help you maintain a healthy weight.  · Insoluble fiber. This is found in whole grains, cereals, certain fruits and vegetables such as apple skin, corn, and carrots. Insoluble fiber may prevent constipation and reduce the risk for certain types of cancer.  · Soluble fiber. This type of fiber is in oats, beans, and certain fruits and vegetables such as strawberries and peas. Soluble fiber can reduce cholesterol, which may help lower the risk for heart disease. It also helps control blood sugar levels.  Look for high-fiber foods  Try these foods to add fiber to your diet:  · Whole-grain breads and cereals. Try to eat 6 to 8 ounces a day. Include wheat and oat bran cereals, whole-wheat muffins or toast, and corn tortillas in your meals.  · Fruits. Try to eat 2 cups a day. Apples, oranges, strawberries, pears, and bananas are good sources. (Note: Fruit juice is low in fiber.)  · Vegetables. Try to eat at least 2.5 cups a day. Add asparagus, carrots, broccoli, peas, and corn to your meals.  · Beans. One cup of cooked lentils gives you over 15 grams of fiber. Try navy beans, lentils, and chickpeas.  · Seeds. A small handful of seeds gives you about 3 grams of fiber. Try sunflower seeds.  Keep track of your fiber  Keep track of how much fiber you eat. Start by reading food labels. Then eat a variety of foods high in fiber. As you begin to eat more fiber, ask your healthcare provider  how much water you should be drinking to keep your digestive system working smoothly.  You should aim for a certain amount of fiber in your diet each day. If you are a woman, that amount is between 25 and 28 grams per day. Men should aim for 30 to 33 grams per day. After age 50, your daily fiber needs drop to 22 grams for women and 28 grams for men.  Before you reach for the fiber supplements, think about this. Fiber is found naturally in healthy whole foods. It gives you that feeling of fullness after you eat. Taking fiber supplements or eating fiber-enriched foods will not give you this full feeling.  Your fiber intake is a good measure for the quality of your overall diet. If you are missing out on your daily amount of fiber, you may be lacking other important nutrients as well.  Date Last Reviewed: 5/11/2015 © 2000-2017 [employer]. [address] [place] [address]. All rights reserved. This information is not intended as a substitute for professional medical care. Always follow your healthcare professional's instructions.          Understanding Colon and Rectal Polyps    The colon (also called the large intestine) is a muscular tube that forms the last part of the digestive tract. It absorbs water and stores food waste. The colon is about 4 to 6 feet long. The rectum is the last 6 inches of the colon. The colon and rectum have a smooth lining composed of millions of cells. Changes in these cells can lead to growths in the colon that can become cancerous and should be removed. Multiple tests are available to screen for colon cancer, but the colonoscopy is the most recommended test. During colonoscopy, these polyps can be removed. How often you need this test depends on many things including your condition, your family history, symptoms, and what the findings were at the previous colonoscopy.   When the colon lining changes  Changes that happen in the cells that line the colon or rectum  can lead to growths called polyps. Over a period of years, polyps can turn cancerous. Removing polyps early may prevent cancer from ever forming.  Polyps  Polyps are fleshy clumps of tissue that form on the lining of the colon or rectum. Small polyps are usually benign (not cancerous). However, over time, cells in a polyp can change and become cancerous. Certain types of polyps known as adenomatous polyps are premalignant. The risk for invasive cancer increases with the size of the polyp and certain cell and gene features. This means that they can become cancerous if they're not removed. Hyperplastic polyps are benign. They can grow quite large and not turn cancerous.   Cancer  Almost all colorectal cancers start when polyp cells begin growing abnormally. As a cancerous tumor grows, it may involve more and more of the colon or rectum. In time, cancer can also grow beyond the colon or rectum and spread to nearby organs or to glands called lymph nodes. The cells can also travel to other parts of the body. This is known as metastasis. The earlier a cancerous tumor is removed, the better the chance of preventing its spread.    Date Last Reviewed: 8/1/2016  © 0863-0219 [employer]. [address] [place] [address]. All rights reserved. This information is not intended as a substitute for professional medical care. Always follow your healthcare professional's instructions.  GERD (Adult)    The esophagus is a tube that carries food from the mouth to the stomach. A valve at the lower end of the esophagus prevents stomach acid from flowing upward. When this valve doesn't work properly, stomach contents may repeatedly flow back up (reflux) into the esophagus. This is called gastroesophageal reflux disease (GERD). GERD can irritate the esophagus. It can cause problems with swallowing or breathing. In severe cases, GERD can cause recurrent pneumonia or other serious problems.  Symptoms of reflux include  "burning, pressure or sharp pain in the upper abdomen or mid to lower chest. The pain can spread to the neck, back, or shoulder. There may be belching, an acid taste in the back of the throat, chronic cough, or sore throat or hoarseness. GERD symptoms often occur during the day after a big meal. They can also occur at night when lying down.   Home care  Lifestyle changes can help reduce symptoms. If needed, medicines may be prescribed. Symptoms often improve with treatment, but if treatment is stopped, the symptoms often return after a few months. So most persons with GERD will need to continue treatment.  Lifestyle changes  · Limit or avoid fatty, fried, and spicy foods, as well as coffee, chocolate, mint, and foods with high acid content such as tomatoes and citrus fruit and juices (orange, grapefruit, lemon).  · Dont eat large meals, especially at night. Frequent, smaller meals are best. Do not lie down right after eating. And dont eat anything 3 hours before going to bed.  · Avoid drinking alcohol and smoking. As much as possible, stay away from second hand smoke.  · If you are overweight, losing weight will reduce symptoms.   · Avoid wearing tight clothing around your stomach area.  · If your symptoms occur during sleep, use a foam wedge to elevate your upper body (not just your head.) Or, place 4" blocks under the head of your bed.  Medicines  If needed, medicines can help relieve the symptoms of GERD and prevent damage to the esophagus. Discuss a medicine plan with your healthcare provider. This may include one or more of the following medicines:  · Antacids to help neutralize the normal acids in your stomach.  · Acid blockers (H2 blockers) to decrease acid production.  · Acid inhibitors (PPIs) to decrease acid production in a different way than the blockers. They may work better, but can take a little longer to take effect.  Take an antacid 30-60 minutes after eating and at bedtime, but not at the same time " as an acid blocker.  Try not to take medicines such as ibuprofen and aspirin. If you are taking aspirin for your heart or other medical reasons, talk to your healthcare provider about stopping it.  Follow-up care  Follow up with your healthcare provider or as advised by our staff.  When to seek medical advice  Call your healthcare provider if any of the following occur:  · Stomach pain gets worse or moves to the lower right abdomen (appendix area)  · Chest pain appears or gets worse, or spreads to the back, neck, shoulder, or arm  · Frequent vomiting (cant keep down liquids)  · Blood in the stool or vomit (red or black in color)  · Feeling weak or dizzy  · Fever of 100.4ºF (38ºC) or higher, or as directed by your healthcare provider  Date Last Reviewed: 6/23/2015  © 3896-9354 [employer]. [address] [place] [address], [place] [address]. All rights reserved. This information is not intended as a substitute for professional medical care. Always follow your healthcare professional's instructions.

## 2020-08-29 ENCOUNTER — LAB VISIT (OUTPATIENT)
Dept: FAMILY MEDICINE | Facility: CLINIC | Age: 80
End: 2020-08-29
Payer: MEDICARE

## 2020-08-29 DIAGNOSIS — Z01.818 PREOP TESTING: ICD-10-CM

## 2020-08-29 PROCEDURE — U0003 INFECTIOUS AGENT DETECTION BY NUCLEIC ACID (DNA OR RNA); SEVERE ACUTE RESPIRATORY SYNDROME CORONAVIRUS 2 (SARS-COV-2) (CORONAVIRUS DISEASE [COVID-19]), AMPLIFIED PROBE TECHNIQUE, MAKING USE OF HIGH THROUGHPUT TECHNOLOGIES AS DESCRIBED BY CMS-2020-01-R: HCPCS

## 2020-08-30 LAB — SARS-COV-2 RNA RESP QL NAA+PROBE: NOT DETECTED

## 2020-09-01 ENCOUNTER — ANESTHESIA EVENT (OUTPATIENT)
Dept: ENDOSCOPY | Facility: HOSPITAL | Age: 80
End: 2020-09-01
Payer: MEDICARE

## 2020-09-01 ENCOUNTER — ANESTHESIA (OUTPATIENT)
Dept: ENDOSCOPY | Facility: HOSPITAL | Age: 80
End: 2020-09-01
Payer: MEDICARE

## 2020-09-01 ENCOUNTER — HOSPITAL ENCOUNTER (OUTPATIENT)
Facility: HOSPITAL | Age: 80
Discharge: HOME | End: 2020-09-01
Attending: INTERNAL MEDICINE | Admitting: INTERNAL MEDICINE
Payer: MEDICARE

## 2020-09-01 VITALS
RESPIRATION RATE: 10 BRPM | WEIGHT: 167 LBS | HEART RATE: 76 BPM | OXYGEN SATURATION: 98 % | SYSTOLIC BLOOD PRESSURE: 133 MMHG | TEMPERATURE: 98 F | HEIGHT: 68 IN | DIASTOLIC BLOOD PRESSURE: 75 MMHG | BODY MASS INDEX: 25.31 KG/M2

## 2020-09-01 DIAGNOSIS — R19.4 CHANGE IN BOWEL HABITS: ICD-10-CM

## 2020-09-01 LAB — GLUCOSE SERPL-MCNC: 193 MG/DL (ref 70–110)

## 2020-09-01 PROCEDURE — 88305 TISSUE EXAM BY PATHOLOGIST: ICD-10-PCS | Mod: 26,,, | Performed by: PATHOLOGY

## 2020-09-01 PROCEDURE — 88305 TISSUE EXAM BY PATHOLOGIST: CPT | Mod: 26,,, | Performed by: PATHOLOGY

## 2020-09-01 PROCEDURE — D9220A PRA ANESTHESIA: Mod: ANES,,, | Performed by: ANESTHESIOLOGY

## 2020-09-01 PROCEDURE — D9220A PRA ANESTHESIA: ICD-10-PCS | Mod: CRNA,,, | Performed by: NURSE ANESTHETIST, CERTIFIED REGISTERED

## 2020-09-01 PROCEDURE — 45385 COLONOSCOPY W/LESION REMOVAL: CPT | Mod: PO | Performed by: INTERNAL MEDICINE

## 2020-09-01 PROCEDURE — 63600175 PHARM REV CODE 636 W HCPCS: Mod: PO | Performed by: NURSE ANESTHETIST, CERTIFIED REGISTERED

## 2020-09-01 PROCEDURE — 45385 COLONOSCOPY W/LESION REMOVAL: CPT | Mod: ,,, | Performed by: INTERNAL MEDICINE

## 2020-09-01 PROCEDURE — 37000009 HC ANESTHESIA EA ADD 15 MINS: Mod: PO | Performed by: INTERNAL MEDICINE

## 2020-09-01 PROCEDURE — D9220A PRA ANESTHESIA: Mod: CRNA,,, | Performed by: NURSE ANESTHETIST, CERTIFIED REGISTERED

## 2020-09-01 PROCEDURE — 63600175 PHARM REV CODE 636 W HCPCS: Mod: PO | Performed by: INTERNAL MEDICINE

## 2020-09-01 PROCEDURE — 27201089 HC SNARE, DISP (ANY): Mod: PO | Performed by: INTERNAL MEDICINE

## 2020-09-01 PROCEDURE — 45385 PR COLONOSCOPY,REMV LESN,SNARE: ICD-10-PCS | Mod: ,,, | Performed by: INTERNAL MEDICINE

## 2020-09-01 PROCEDURE — 37000008 HC ANESTHESIA 1ST 15 MINUTES: Mod: PO | Performed by: INTERNAL MEDICINE

## 2020-09-01 PROCEDURE — 25000003 PHARM REV CODE 250: Mod: PO | Performed by: NURSE ANESTHETIST, CERTIFIED REGISTERED

## 2020-09-01 PROCEDURE — 88305 TISSUE EXAM BY PATHOLOGIST: CPT | Mod: 59 | Performed by: PATHOLOGY

## 2020-09-01 PROCEDURE — D9220A PRA ANESTHESIA: ICD-10-PCS | Mod: ANES,,, | Performed by: ANESTHESIOLOGY

## 2020-09-01 RX ORDER — LIDOCAINE HCL/PF 100 MG/5ML
SYRINGE (ML) INTRAVENOUS
Status: DISCONTINUED | OUTPATIENT
Start: 2020-09-01 | End: 2020-09-01

## 2020-09-01 RX ORDER — SODIUM CHLORIDE 0.9 % (FLUSH) 0.9 %
10 SYRINGE (ML) INJECTION
Status: DISCONTINUED | OUTPATIENT
Start: 2020-09-01 | End: 2020-09-01 | Stop reason: HOSPADM

## 2020-09-01 RX ORDER — PROPOFOL 10 MG/ML
VIAL (ML) INTRAVENOUS
Status: DISCONTINUED | OUTPATIENT
Start: 2020-09-01 | End: 2020-09-01

## 2020-09-01 RX ORDER — SODIUM CHLORIDE, SODIUM LACTATE, POTASSIUM CHLORIDE, CALCIUM CHLORIDE 600; 310; 30; 20 MG/100ML; MG/100ML; MG/100ML; MG/100ML
INJECTION, SOLUTION INTRAVENOUS CONTINUOUS
Status: DISCONTINUED | OUTPATIENT
Start: 2020-09-01 | End: 2020-09-01 | Stop reason: HOSPADM

## 2020-09-01 RX ADMIN — PROPOFOL 25 MG: 10 INJECTION, EMULSION INTRAVENOUS at 08:09

## 2020-09-01 RX ADMIN — PROPOFOL 75 MG: 10 INJECTION, EMULSION INTRAVENOUS at 08:09

## 2020-09-01 RX ADMIN — SODIUM CHLORIDE, SODIUM LACTATE, POTASSIUM CHLORIDE, AND CALCIUM CHLORIDE: .6; .31; .03; .02 INJECTION, SOLUTION INTRAVENOUS at 07:09

## 2020-09-01 RX ADMIN — LIDOCAINE HYDROCHLORIDE 100 MG: 20 INJECTION, SOLUTION INTRAVENOUS at 08:09

## 2020-09-01 NOTE — ANESTHESIA PREPROCEDURE EVALUATION
2020  Cecilio Cespedes is a 80 y.o., male.    Anesthesia Evaluation    I have reviewed the Patient Summary Reports.    I have reviewed the Nursing Notes. I have reviewed the NPO Status.   I have reviewed the Medications.     Review of Systems  Anesthesia Hx:  No problems with previous Anesthesia Hx of Anesthetic complications    Social:  Former Smoker Smoking Status: Former Smoker - 40 pack years  Quit Smokin16  Smokeless Tobacco Status: Never Used  Alcohol use: Yes, unspecified volume  Drug use: No       Cardiovascular:   Denies Hypertension.  Denies MI.  Denies CAD.    Denies CABG/stent.   Denies Angina.    Pulmonary:   Denies COPD.  Denies Asthma.  Denies Recent URI.    Renal/:   Chronic Renal Disease Stage 3 chronic kidney disease   Hepatic/GI:   Denies GERD. Denies Liver Disease.    Neurological:   Denies TIA. Denies CVA. Neuromuscular Disease,  Denies Seizures.    Endocrine:   Diabetes, well controlled, type 2 Denies Hypothyroidism.    Psych:   Denies Psychiatric History.          Physical Exam  General:  Well nourished    Airway/Jaw/Neck:  Airway Findings: Mouth Opening: Normal Tongue: Normal  General Airway Assessment: Adult, Good  Mallampati: II  Improves to II with phonation.  TM Distance: 4-6 cm      Dental:  Dental Findings: In tact   Chest/Lungs:  Chest/Lungs Findings: Clear to auscultation, Normal Respiratory Rate     Heart/Vascular:  Heart Findings: Rate: Normal  Rhythm: Regular Rhythm  Sounds: Normal  Heart murmur: negative       Mental Status:  Mental Status Findings:  Cooperative, Alert and Oriented         Anesthesia Plan  Type of Anesthesia, risks & benefits discussed:  Anesthesia Type:  general  Patient's Preference:   Intra-op Monitoring Plan: standard ASA monitors  Intra-op Monitoring Plan Comments:   Post Op Pain Control Plan:   Post Op Pain Control Plan Comments:    Induction:   IV  Beta Blocker:  Patient is not currently on a Beta-Blocker (No further documentation required).       Informed Consent: Patient understands risks and agrees with Anesthesia plan.  Questions answered. Anesthesia consent signed with patient.  ASA Score: 3     Day of Surgery Review of History & Physical: I have interviewed and examined the patient. I have reviewed the patient's H&P dated:  There are no significant changes.  H&P update referred to the surgeon.  H&P completed by Anesthesiologist.       Ready For Surgery From Anesthesia Perspective.

## 2020-09-01 NOTE — ANESTHESIA POSTPROCEDURE EVALUATION
Anesthesia Post Evaluation    Patient: Cecilio Cespedes    Procedure(s) Performed: Procedure(s) (LRB):  COLONOSCOPY (N/A)    Final Anesthesia Type: general    Patient location during evaluation: PACU  Patient participation: Yes- Able to Participate  Level of consciousness: awake and alert and oriented  Post-procedure vital signs: reviewed and stable  Pain management: adequate  Airway patency: patent    PONV status at discharge: No PONV  Anesthetic complications: no      Cardiovascular status: blood pressure returned to baseline  Respiratory status: unassisted, spontaneous ventilation and room air  Hydration status: euvolemic  Follow-up not needed.          Vitals Value Taken Time   /60 09/01/20 0845   Temp 36.4 °C (97.5 °F) 09/01/20 0833   Pulse 75 09/01/20 0845   Resp 10 09/01/20 0845   SpO2 95 % 09/01/20 0845         No case tracking events are documented in the log.      Pain/Myron Score: Myron Score: 5 (9/1/2020  8:33 AM)

## 2020-09-01 NOTE — H&P
History & Physical - Short Stay  Gastroenterology      SUBJECTIVE:     Procedure: Colonoscopy    Chief Complaint/Indication for Procedure: Change in Bowel Habits and Previous Polyps    PTA Medications   Medication Sig    aspirin-sod bicarb-citric acid (CHASE-SELTZER) 324 mg TbEF Take 325 mg by mouth every 6 (six) hours as needed.    bisacodyl (DULCOLAX) 5 mg EC tablet Take 5 mg by mouth daily as needed for Constipation.    glimepiride (AMARYL) 1 MG tablet TAKE 1 TABLET BEFORE BREAKFAST    Lactobac no.41/Bifidobact no.7 (PROBIOTIC-10 ORAL) Take by mouth once daily.    pravastatin (PRAVACHOL) 20 MG tablet Take 1 tablet (20 mg total) by mouth once daily.    blood sugar diagnostic Strp Dispense with lancets of choice to check bid  Dx code e11.42    lancets Misc To check BG 2 times daily, to use with insurance preferred meter    linaCLOtide (LINZESS) 72 mcg Cap capsule Take 1 capsule (72 mcg total) by mouth before breakfast. Takes > 30 minutes before 1st meal of the day (Patient not taking: Reported on 8/31/2020)    sodium phosphates (FLEET PEDIATRIC) 9.5-3.5 gram/59 mL Enem Place 1 enema rectally as needed.       Review of patient's allergies indicates:   Allergen Reactions    No known allergies         Past Medical History:   Diagnosis Date    Colon polyp     Diabetes 2001    Diverticulosis     Hyperlipidemia     PONV (postoperative nausea and vomiting)     Stage 3 chronic kidney disease 12/12/2018    Type 2 diabetes mellitus      Past Surgical History:   Procedure Laterality Date    COLONOSCOPY  03/14/2011    Dr. Dejesus: 2 colon polyps removed (one was 10 mm), diverticulosis, hemorrhoids, repeat in 3 years for surveillance; biopsy: FRAGMENTS OF TUBULAR ADENOMA.    HERNIA REPAIR       Family History   Problem Relation Age of Onset    Diabetes Father     Diabetes Brother     Cancer Mother         stomach    Celiac disease Neg Hx     Colon cancer Neg Hx     Crohn's disease Neg Hx     Ulcerative  colitis Neg Hx     Stomach cancer Neg Hx     Esophageal cancer Neg Hx      Social History     Tobacco Use    Smoking status: Former Smoker     Packs/day: 2.00     Years: 20.00     Pack years: 40.00     Types: Cigars     Quit date: 2016     Years since quittin.0    Smokeless tobacco: Never Used    Tobacco comment: 2 cigars per day   Substance Use Topics    Alcohol use: Yes     Comment: occasionally- not on a weekly basis    Drug use: No         OBJECTIVE:     Vital Signs (Most Recent)       Physical Exam:                                                       GENERAL:  Comfortable, in no acute distress.                                 HEENT EXAM:  Nonicteric.  No adenopathy.  Oropharynx is clear.               NECK:  Supple.                                                               LUNGS:  Clear.                                                               CARDIAC:  Regular rate and rhythm.  S1, S2.  No murmur.                      ABDOMEN:  Soft, positive bowel sounds, nontender.  No hepatosplenomegaly or masses.  No rebound or guarding.                                             EXTREMITIES:  No edema.     MENTAL STATUS:  Normal, alert and oriented.      ASSESSMENT/PLAN:     Assessment: Change in Bowel Habits and Previous Polyps    Plan: Colonoscopy    Anesthesia Plan: General    ASA Grade: ASA 2 - Patient with mild systemic disease with no functional limitations    MALLAMPATI SCORE:  I (soft palate, uvula, fauces, and tonsillar pillars visible)     In my medical opinion and judgment, this medical or surgical procedure was not able to be safely postponed in accordance with [place] [facility], Healthcare Facility Notice #[id number].

## 2020-09-01 NOTE — DISCHARGE SUMMARY
Discharge Note  Short Stay      SUMMARY     Admit Date: 9/1/2020    Attending Physician: Denny Dejesus MD     Discharge Physician: Denny Dejesus MD    Discharge Date: 9/1/2020 8:36 AM    Final Diagnosis: Change in bowel habits [R19.4]  Constipation, unspecified constipation type [K59.00]  Hx of colonic polyps [Z86.010]    Disposition: HOME OR SELF CARE    Patient Instructions:   Current Discharge Medication List      CONTINUE these medications which have NOT CHANGED    Details   aspirin-sod bicarb-citric acid (CHASE-SELTZER) 324 mg TbEF Take 325 mg by mouth every 6 (six) hours as needed.      bisacodyl (DULCOLAX) 5 mg EC tablet Take 5 mg by mouth daily as needed for Constipation.      glimepiride (AMARYL) 1 MG tablet TAKE 1 TABLET BEFORE BREAKFAST  Qty: 90 tablet, Refills: 3    Associated Diagnoses: Type 2 diabetes mellitus without complication, without long-term current use of insulin      Lactobac no.41/Bifidobact no.7 (PROBIOTIC-10 ORAL) Take by mouth once daily.      pravastatin (PRAVACHOL) 20 MG tablet Take 1 tablet (20 mg total) by mouth once daily.  Qty: 90 tablet, Refills: 3    Associated Diagnoses: Dyslipidemia      blood sugar diagnostic Strp Dispense with lancets of choice to check bid  Dx code e11.42  Qty: 200 each, Refills: prn    Comments: Brand of choice or insurance preference (he may have One [employer])      lancets Misc To check BG 2 times daily, to use with insurance preferred meter  Qty: 200 each, Refills: 3      linaCLOtide (LINZESS) 72 mcg Cap capsule Take 1 capsule (72 mcg total) by mouth before breakfast. Takes > 30 minutes before 1st meal of the day  Qty: 30 capsule, Refills: 1    Associated Diagnoses: Change in bowel function; Chronic constipation      sodium phosphates (FLEET PEDIATRIC) 9.5-3.5 gram/59 mL Enem Place 1 enema rectally as needed.         STOP taking these medications       ONETOUCH ULTRA2 kit Comments:   Reason for Stopping:               Discharge Procedure Orders  (must include Diet, Follow-up, Activity)    Follow Up:  Follow up with PCP as previously scheduled  Resume routine diet.  Activity as tolerated.    No driving day of procedure.

## 2020-09-01 NOTE — [employer] PATIENT INSTRUCTIONS
Discharge Summary/Instructions after an Endoscopic Procedure  Patient Name: Cecilio Cespedes  Patient MRN: [id number]  Patient YOB: 1940 Tuesday, September 1, 2020  Denny Dejesus MD  RESTRICTIONS:  During your procedure today, you received medications for sedation.  These   medications may affect your judgment, balance and coordination.  Therefore,   for 24 hours, you have the following restrictions:   - DO NOT drive a car, operate machinery, make legal/financial decisions,   sign important papers or drink alcohol.    ACTIVITY:  Today: no heavy lifting, straining or running due to procedural   sedation/anesthesia.  The following day: return to full activity including work.  DIET:  Eat and drink normally unless instructed otherwise.     TREATMENT FOR COMMON SIDE EFFECTS:  - Mild abdominal pain, nausea, belching, bloating or excessive gas:  rest,   eat lightly and use a heating pad.  - Sore Throat: treat with throat lozenges and/or gargle with warm salt   water.  - Because air was used during the procedure, expelling large amounts of air   from your rectum or belching is normal.  - If a bowel prep was taken, you may not have a bowel movement for 1-3 days.    This is normal.  SYMPTOMS TO WATCH FOR AND REPORT TO YOUR PHYSICIAN:  1. Abdominal pain or bloating, other than gas cramps.  2. Chest pain.  3. Back pain.  4. Signs of infection such as: chills or fever occurring within 24 hours   after the procedure.  5. Rectal bleeding, which would show as bright red, maroon, or black stools.   (A tablespoon of blood from the rectum is not serious, especially if   hemorrhoids are present.)  6. Vomiting.  7. Weakness or dizziness.  GO DIRECTLY TO THE NEAREST EMERGENCY ROOM IF YOU HAVE ANY OF THE FOLLOWING:      Difficulty breathing              Chills and/or fever over 101 F   Persistent vomiting and/or vomiting blood   Severe abdominal pain   Severe chest pain   Black, tarry stools   Bleeding- more than one  tablespoon   Any other symptom or condition that you feel may need urgent attention  Your doctor recommends these additional instructions:  If any biopsies were taken, your doctors clinic will contact you in 1 to 2   weeks with any results.  We are waiting for your pathology results.   Your physician has indicated that a repeat colonoscopy is not recommended   for surveillance.   You are being discharged to home.  For questions, problems or results please call your physician - Denny Dejesus MD at Work:  [phone].  EMERGENCY PHONE NUMBER: [phone], LAB RESULTS: [phone]  IF A COMPLICATION OR EMERGENCY SITUATION ARISES AND YOU ARE UNABLE TO REACH   YOUR PHYSICIAN - GO DIRECTLY TO THE EMERGENCY ROOM.  ___________________________________________  Nurse Signature  ___________________________________________  Patient/Designated Responsible Party Signature  Denny Dejesus MD  9/1/2020 8:35:51 AM  This report has been verified and signed electronically.  [employer]

## 2020-09-01 NOTE — TRANSFER OF CARE
"Anesthesia Transfer of Care Note    Patient: Cecilio Cespedes    Procedure(s) Performed: Procedure(s) (LRB):  COLONOSCOPY (N/A)    Patient location: PACU    Anesthesia Type: general    Transport from OR: Transported from OR on room air with adequate spontaneous ventilation    Post pain: adequate analgesia    Post assessment: no apparent anesthetic complications and tolerated procedure well    Post vital signs: stable    Level of consciousness: sedated    Nausea/Vomiting: no nausea/vomiting    Complications: none    Transfer of care protocol was followed      Last vitals:   Visit Vitals  BP (!) 142/67 (BP Location: Right arm, Patient Position: Lying)   Pulse 77   Temp 36.6 °C (97.9 °F) (Skin)   Resp 18   Ht 5' 8" (1.727 m)   Wt 75.8 kg (167 lb)   SpO2 95%   BMI 25.39 kg/m²     "

## 2020-09-04 LAB
FINAL PATHOLOGIC DIAGNOSIS: NORMAL
GROSS: NORMAL

## 2020-09-10 ENCOUNTER — IMMUNIZATION (OUTPATIENT)
Dept: PHARMACY | Facility: CLINIC | Age: 80
End: 2020-09-10
Payer: MEDICARE

## 2020-10-17 ENCOUNTER — TELEPHONE (OUTPATIENT)
Dept: GASTROENTEROLOGY | Facility: CLINIC | Age: 80
End: 2020-10-17

## 2020-10-17 DIAGNOSIS — R19.8 CHANGE IN BOWEL FUNCTION: ICD-10-CM

## 2020-10-17 DIAGNOSIS — K59.09 CHRONIC CONSTIPATION: ICD-10-CM

## 2020-10-19 NOTE — TELEPHONE ENCOUNTER
Received refill request for linzess. I reviewed patient chart and noted that patient reported on 8/31/2020 that he was not taking linzess. Please clarify with patient if he is taking linzess and if so does he need a refill. Patient is also due for a follow-up appointment, especially if his symptoms have persisted.  Thanks  JOHNNY

## 2020-11-23 ENCOUNTER — PES CALL (OUTPATIENT)
Dept: ADMINISTRATIVE | Facility: CLINIC | Age: 80
End: 2020-11-23

## 2020-12-23 ENCOUNTER — TELEPHONE (OUTPATIENT)
Dept: FAMILY MEDICINE | Facility: CLINIC | Age: 80
End: 2020-12-23

## 2020-12-23 DIAGNOSIS — E11.42 TYPE 2 DIABETES MELLITUS WITH PERIPHERAL NEUROPATHY: Primary | ICD-10-CM

## 2020-12-23 DIAGNOSIS — N18.30 STAGE 3 CHRONIC KIDNEY DISEASE, UNSPECIFIED WHETHER STAGE 3A OR 3B CKD: ICD-10-CM

## 2020-12-23 NOTE — TELEPHONE ENCOUNTER
----- Message from Parvin Goins sent at 12/23/2020  2:50 PM CST -----  Regarding: advice  Contact: wife  Type: Needs Medical Advice  Who Called:  wife -Karrie   Symptoms (please be specific):    How long has patient had these symptoms:    Pharmacy name and phone #:    Best Call Back Number: [phone]  Additional Information: Patient's wife want to know if patient should have labs prior to seeing the doctor.

## 2020-12-31 RX ORDER — LANCETS
EACH MISCELLANEOUS
Qty: 200 EACH | Refills: 3 | Status: SHIPPED | OUTPATIENT
Start: 2020-12-31 | End: 2022-05-23

## 2021-01-02 DIAGNOSIS — E78.5 DYSLIPIDEMIA: ICD-10-CM

## 2021-01-04 RX ORDER — PRAVASTATIN SODIUM 20 MG/1
20 TABLET ORAL DAILY
Qty: 90 TABLET | Refills: 1 | Status: SHIPPED | OUTPATIENT
Start: 2021-01-04 | End: 2021-06-30 | Stop reason: SDUPTHER

## 2021-01-05 ENCOUNTER — PATIENT MESSAGE (OUTPATIENT)
Dept: ADMINISTRATIVE | Facility: OTHER | Age: 81
End: 2021-01-05

## 2021-01-10 ENCOUNTER — IMMUNIZATION (OUTPATIENT)
Dept: FAMILY MEDICINE | Facility: CLINIC | Age: 81
End: 2021-01-10
Payer: MEDICARE

## 2021-01-10 DIAGNOSIS — Z23 NEED FOR VACCINATION: ICD-10-CM

## 2021-01-10 PROCEDURE — 91300 COVID-19, MRNA, LNP-S, PF, 30 MCG/0.3 ML DOSE VACCINE: CPT | Mod: PBBFAC,PO

## 2021-01-14 ENCOUNTER — LAB VISIT (OUTPATIENT)
Dept: LAB | Facility: HOSPITAL | Age: 81
End: 2021-01-14
Attending: FAMILY MEDICINE
Payer: MEDICARE

## 2021-01-14 DIAGNOSIS — E11.42 TYPE 2 DIABETES MELLITUS WITH PERIPHERAL NEUROPATHY: ICD-10-CM

## 2021-01-14 LAB
BASOPHILS # BLD AUTO: 0.05 K/UL (ref 0–0.2)
BASOPHILS NFR BLD: 0.6 % (ref 0–1.9)
DIFFERENTIAL METHOD: ABNORMAL
EOSINOPHIL # BLD AUTO: 0.2 K/UL (ref 0–0.5)
EOSINOPHIL NFR BLD: 3 % (ref 0–8)
ERYTHROCYTE [DISTWIDTH] IN BLOOD BY AUTOMATED COUNT: 13.2 % (ref 11.5–14.5)
HCT VFR BLD AUTO: 46.5 % (ref 40–54)
HGB BLD-MCNC: 14.9 G/DL (ref 14–18)
IMM GRANULOCYTES # BLD AUTO: 0.06 K/UL (ref 0–0.04)
IMM GRANULOCYTES NFR BLD AUTO: 0.7 % (ref 0–0.5)
LYMPHOCYTES # BLD AUTO: 2.6 K/UL (ref 1–4.8)
LYMPHOCYTES NFR BLD: 31.9 % (ref 18–48)
MCH RBC QN AUTO: 30.9 PG (ref 27–31)
MCHC RBC AUTO-ENTMCNC: 32 G/DL (ref 32–36)
MCV RBC AUTO: 97 FL (ref 82–98)
MONOCYTES # BLD AUTO: 0.6 K/UL (ref 0.3–1)
MONOCYTES NFR BLD: 6.8 % (ref 4–15)
NEUTROPHILS # BLD AUTO: 4.6 K/UL (ref 1.8–7.7)
NEUTROPHILS NFR BLD: 57 % (ref 38–73)
NRBC BLD-RTO: 0 /100 WBC
PLATELET # BLD AUTO: 292 K/UL (ref 150–350)
PMV BLD AUTO: 12 FL (ref 9.2–12.9)
RBC # BLD AUTO: 4.82 M/UL (ref 4.6–6.2)
WBC # BLD AUTO: 8.13 K/UL (ref 3.9–12.7)

## 2021-01-14 PROCEDURE — 36415 COLL VENOUS BLD VENIPUNCTURE: CPT | Mod: PN

## 2021-01-14 PROCEDURE — 80053 COMPREHEN METABOLIC PANEL: CPT

## 2021-01-14 PROCEDURE — 83036 HEMOGLOBIN GLYCOSYLATED A1C: CPT

## 2021-01-14 PROCEDURE — 80061 LIPID PANEL: CPT

## 2021-01-14 PROCEDURE — 85025 COMPLETE CBC W/AUTO DIFF WBC: CPT

## 2021-01-15 ENCOUNTER — TELEPHONE (OUTPATIENT)
Dept: FAMILY MEDICINE | Facility: CLINIC | Age: 81
End: 2021-01-15

## 2021-01-15 LAB
ALBUMIN SERPL BCP-MCNC: 4 G/DL (ref 3.5–5.2)
ALP SERPL-CCNC: 64 U/L (ref 55–135)
ALT SERPL W/O P-5'-P-CCNC: 21 U/L (ref 10–44)
ANION GAP SERPL CALC-SCNC: 10 MMOL/L (ref 8–16)
AST SERPL-CCNC: 17 U/L (ref 10–40)
BILIRUB SERPL-MCNC: 0.4 MG/DL (ref 0.1–1)
BUN SERPL-MCNC: 19 MG/DL (ref 8–23)
CALCIUM SERPL-MCNC: 9 MG/DL (ref 8.7–10.5)
CHLORIDE SERPL-SCNC: 102 MMOL/L (ref 95–110)
CHOLEST SERPL-MCNC: 169 MG/DL (ref 120–199)
CHOLEST/HDLC SERPL: 4.3 {RATIO} (ref 2–5)
CO2 SERPL-SCNC: 25 MMOL/L (ref 23–29)
CREAT SERPL-MCNC: 1.4 MG/DL (ref 0.5–1.4)
EST. GFR  (AFRICAN AMERICAN): 54.1 ML/MIN/1.73 M^2
EST. GFR  (NON AFRICAN AMERICAN): 46.8 ML/MIN/1.73 M^2
ESTIMATED AVG GLUCOSE: 203 MG/DL (ref 68–131)
GLUCOSE SERPL-MCNC: 181 MG/DL (ref 70–110)
HBA1C MFR BLD HPLC: 8.7 % (ref 4–5.6)
HDLC SERPL-MCNC: 39 MG/DL (ref 40–75)
HDLC SERPL: 23.1 % (ref 20–50)
LDLC SERPL CALC-MCNC: 108.2 MG/DL (ref 63–159)
NONHDLC SERPL-MCNC: 130 MG/DL
POTASSIUM SERPL-SCNC: 3.8 MMOL/L (ref 3.5–5.1)
PROT SERPL-MCNC: 7.3 G/DL (ref 6–8.4)
SODIUM SERPL-SCNC: 137 MMOL/L (ref 136–145)
TRIGL SERPL-MCNC: 109 MG/DL (ref 30–150)

## 2021-01-19 ENCOUNTER — TELEPHONE (OUTPATIENT)
Dept: ADMINISTRATIVE | Facility: HOSPITAL | Age: 81
End: 2021-01-19

## 2021-01-19 ENCOUNTER — PATIENT MESSAGE (OUTPATIENT)
Dept: PRIMARY CARE CLINIC | Facility: CLINIC | Age: 81
End: 2021-01-19

## 2021-01-19 ENCOUNTER — OFFICE VISIT (OUTPATIENT)
Dept: FAMILY MEDICINE | Facility: CLINIC | Age: 81
End: 2021-01-19
Payer: MEDICARE

## 2021-01-19 VITALS
DIASTOLIC BLOOD PRESSURE: 68 MMHG | WEIGHT: 172.5 LBS | OXYGEN SATURATION: 96 % | HEIGHT: 68 IN | BODY MASS INDEX: 26.14 KG/M2 | HEART RATE: 91 BPM | TEMPERATURE: 98 F | SYSTOLIC BLOOD PRESSURE: 122 MMHG

## 2021-01-19 DIAGNOSIS — E11.42 TYPE 2 DIABETES MELLITUS WITH PERIPHERAL NEUROPATHY: Primary | ICD-10-CM

## 2021-01-19 DIAGNOSIS — N18.30 STAGE 3 CHRONIC KIDNEY DISEASE, UNSPECIFIED WHETHER STAGE 3A OR 3B CKD: ICD-10-CM

## 2021-01-19 DIAGNOSIS — K59.00 CONSTIPATION, UNSPECIFIED CONSTIPATION TYPE: ICD-10-CM

## 2021-01-19 DIAGNOSIS — E11.42 DIABETIC POLYNEUROPATHY ASSOCIATED WITH TYPE 2 DIABETES MELLITUS: ICD-10-CM

## 2021-01-19 DIAGNOSIS — Z23 NEED FOR PROPHYLACTIC VACCINATION AGAINST STREPTOCOCCUS PNEUMONIAE (PNEUMOCOCCUS): ICD-10-CM

## 2021-01-19 DIAGNOSIS — E78.5 DYSLIPIDEMIA: ICD-10-CM

## 2021-01-19 DIAGNOSIS — I73.9 PVD (PERIPHERAL VASCULAR DISEASE): ICD-10-CM

## 2021-01-19 PROCEDURE — 1159F PR MEDICATION LIST DOCUMENTED IN MEDICAL RECORD: ICD-10-PCS | Mod: S$GLB,,, | Performed by: FAMILY MEDICINE

## 2021-01-19 PROCEDURE — 1159F MED LIST DOCD IN RCRD: CPT | Mod: S$GLB,,, | Performed by: FAMILY MEDICINE

## 2021-01-19 PROCEDURE — 99214 PR OFFICE/OUTPT VISIT, EST, LEVL IV, 30-39 MIN: ICD-10-PCS | Mod: 25,S$GLB,, | Performed by: FAMILY MEDICINE

## 2021-01-19 PROCEDURE — G0009 PNEUMOCOCCAL POLYSACCHARIDE VACCINE 23-VALENT =>2YO SQ IM: ICD-10-PCS | Mod: S$GLB,,, | Performed by: FAMILY MEDICINE

## 2021-01-19 PROCEDURE — G0009 ADMIN PNEUMOCOCCAL VACCINE: HCPCS | Mod: S$GLB,,, | Performed by: FAMILY MEDICINE

## 2021-01-19 PROCEDURE — 99999 PR PBB SHADOW E&M-EST. PATIENT-LVL IV: CPT | Mod: PBBFAC,,, | Performed by: FAMILY MEDICINE

## 2021-01-19 PROCEDURE — 3052F PR MOST RECENT HEMOGLOBIN A1C LEVEL 8.0 - < 9.0%: ICD-10-PCS | Mod: S$GLB,,, | Performed by: FAMILY MEDICINE

## 2021-01-19 PROCEDURE — 99999 PR PBB SHADOW E&M-EST. PATIENT-LVL IV: ICD-10-PCS | Mod: PBBFAC,,, | Performed by: FAMILY MEDICINE

## 2021-01-19 PROCEDURE — 1101F PR PT FALLS ASSESS DOC 0-1 FALLS W/OUT INJ PAST YR: ICD-10-PCS | Mod: S$GLB,,, | Performed by: FAMILY MEDICINE

## 2021-01-19 PROCEDURE — 3052F HG A1C>EQUAL 8.0%<EQUAL 9.0%: CPT | Mod: S$GLB,,, | Performed by: FAMILY MEDICINE

## 2021-01-19 PROCEDURE — 99214 OFFICE O/P EST MOD 30 MIN: CPT | Mod: 25,S$GLB,, | Performed by: FAMILY MEDICINE

## 2021-01-19 PROCEDURE — 3288F FALL RISK ASSESSMENT DOCD: CPT | Mod: S$GLB,,, | Performed by: FAMILY MEDICINE

## 2021-01-19 PROCEDURE — 90732 PNEUMOCOCCAL POLYSACCHARIDE VACCINE 23-VALENT =>2YO SQ IM: ICD-10-PCS | Mod: S$GLB,,, | Performed by: FAMILY MEDICINE

## 2021-01-19 PROCEDURE — 1101F PT FALLS ASSESS-DOCD LE1/YR: CPT | Mod: S$GLB,,, | Performed by: FAMILY MEDICINE

## 2021-01-19 PROCEDURE — 3288F PR FALLS RISK ASSESSMENT DOCUMENTED: ICD-10-PCS | Mod: S$GLB,,, | Performed by: FAMILY MEDICINE

## 2021-01-19 PROCEDURE — 90732 PPSV23 VACC 2 YRS+ SUBQ/IM: CPT | Mod: S$GLB,,, | Performed by: FAMILY MEDICINE

## 2021-01-25 ENCOUNTER — TELEPHONE (OUTPATIENT)
Dept: FAMILY MEDICINE | Facility: CLINIC | Age: 81
End: 2021-01-25

## 2021-01-31 ENCOUNTER — IMMUNIZATION (OUTPATIENT)
Dept: FAMILY MEDICINE | Facility: CLINIC | Age: 81
End: 2021-01-31
Payer: MEDICARE

## 2021-01-31 DIAGNOSIS — Z23 NEED FOR VACCINATION: Primary | ICD-10-CM

## 2021-01-31 PROCEDURE — 0002A COVID-19, MRNA, LNP-S, PF, 30 MCG/0.3 ML DOSE VACCINE: CPT | Mod: PBBFAC | Performed by: INTERNAL MEDICINE

## 2021-01-31 PROCEDURE — 91300 COVID-19, MRNA, LNP-S, PF, 30 MCG/0.3 ML DOSE VACCINE: CPT | Mod: PBBFAC | Performed by: INTERNAL MEDICINE

## 2021-02-20 DIAGNOSIS — E11.9 TYPE 2 DIABETES MELLITUS WITHOUT COMPLICATION, WITHOUT LONG-TERM CURRENT USE OF INSULIN: ICD-10-CM

## 2021-02-22 RX ORDER — GLIMEPIRIDE 1 MG/1
TABLET ORAL
Qty: 90 TABLET | Refills: 0 | Status: SHIPPED | OUTPATIENT
Start: 2021-02-22 | End: 2021-03-02

## 2021-03-02 ENCOUNTER — OFFICE VISIT (OUTPATIENT)
Dept: PRIMARY CARE CLINIC | Facility: CLINIC | Age: 81
End: 2021-03-02
Payer: MEDICARE

## 2021-03-02 VITALS
WEIGHT: 169.06 LBS | HEART RATE: 83 BPM | SYSTOLIC BLOOD PRESSURE: 114 MMHG | RESPIRATION RATE: 18 BRPM | BODY MASS INDEX: 25.62 KG/M2 | HEIGHT: 68 IN | OXYGEN SATURATION: 98 % | TEMPERATURE: 98 F | DIASTOLIC BLOOD PRESSURE: 64 MMHG

## 2021-03-02 DIAGNOSIS — E11.9 TYPE 2 DIABETES MELLITUS WITHOUT COMPLICATION, WITHOUT LONG-TERM CURRENT USE OF INSULIN: Primary | ICD-10-CM

## 2021-03-02 DIAGNOSIS — R26.89 BALANCE PROBLEM: ICD-10-CM

## 2021-03-02 PROCEDURE — 3288F PR FALLS RISK ASSESSMENT DOCUMENTED: ICD-10-PCS | Mod: S$GLB,,, | Performed by: FAMILY MEDICINE

## 2021-03-02 PROCEDURE — 1126F PR PAIN SEVERITY QUANTIFIED, NO PAIN PRESENT: ICD-10-PCS | Mod: S$GLB,,, | Performed by: FAMILY MEDICINE

## 2021-03-02 PROCEDURE — 99999 PR PBB SHADOW E&M-EST. PATIENT-LVL V: CPT | Mod: PBBFAC,,, | Performed by: FAMILY MEDICINE

## 2021-03-02 PROCEDURE — 3288F FALL RISK ASSESSMENT DOCD: CPT | Mod: S$GLB,,, | Performed by: FAMILY MEDICINE

## 2021-03-02 PROCEDURE — 1159F PR MEDICATION LIST DOCUMENTED IN MEDICAL RECORD: ICD-10-PCS | Mod: S$GLB,,, | Performed by: FAMILY MEDICINE

## 2021-03-02 PROCEDURE — 99214 OFFICE O/P EST MOD 30 MIN: CPT | Mod: S$GLB,,, | Performed by: FAMILY MEDICINE

## 2021-03-02 PROCEDURE — 1101F PR PT FALLS ASSESS DOC 0-1 FALLS W/OUT INJ PAST YR: ICD-10-PCS | Mod: S$GLB,,, | Performed by: FAMILY MEDICINE

## 2021-03-02 PROCEDURE — 3052F PR MOST RECENT HEMOGLOBIN A1C LEVEL 8.0 - < 9.0%: ICD-10-PCS | Mod: S$GLB,,, | Performed by: FAMILY MEDICINE

## 2021-03-02 PROCEDURE — 99214 PR OFFICE/OUTPT VISIT, EST, LEVL IV, 30-39 MIN: ICD-10-PCS | Mod: S$GLB,,, | Performed by: FAMILY MEDICINE

## 2021-03-02 PROCEDURE — 1126F AMNT PAIN NOTED NONE PRSNT: CPT | Mod: S$GLB,,, | Performed by: FAMILY MEDICINE

## 2021-03-02 PROCEDURE — 1159F MED LIST DOCD IN RCRD: CPT | Mod: S$GLB,,, | Performed by: FAMILY MEDICINE

## 2021-03-02 PROCEDURE — 1101F PT FALLS ASSESS-DOCD LE1/YR: CPT | Mod: S$GLB,,, | Performed by: FAMILY MEDICINE

## 2021-03-02 PROCEDURE — 3052F HG A1C>EQUAL 8.0%<EQUAL 9.0%: CPT | Mod: S$GLB,,, | Performed by: FAMILY MEDICINE

## 2021-03-02 PROCEDURE — 99999 PR PBB SHADOW E&M-EST. PATIENT-LVL V: ICD-10-PCS | Mod: PBBFAC,,, | Performed by: FAMILY MEDICINE

## 2021-03-02 RX ORDER — GLIMEPIRIDE 2 MG/1
TABLET ORAL
Qty: 90 TABLET | Refills: 1 | Status: SHIPPED | OUTPATIENT
Start: 2021-03-02 | End: 2021-05-10 | Stop reason: SDUPTHER

## 2021-03-08 ENCOUNTER — CLINICAL SUPPORT (OUTPATIENT)
Dept: REHABILITATION | Facility: HOSPITAL | Age: 81
End: 2021-03-08
Attending: FAMILY MEDICINE
Payer: MEDICARE

## 2021-03-08 DIAGNOSIS — R53.1 DECREASED STRENGTH: ICD-10-CM

## 2021-03-08 DIAGNOSIS — R26.89 BALANCE PROBLEM: ICD-10-CM

## 2021-03-08 PROBLEM — R26.9 GAIT ABNORMALITY: Status: RESOLVED | Noted: 2019-03-22 | Resolved: 2021-03-08

## 2021-03-08 PROBLEM — M25.60 LIMITATION OF JOINT MOTION OF LUMBAR SPINE: Status: RESOLVED | Noted: 2019-03-22 | Resolved: 2021-03-08

## 2021-03-08 PROBLEM — M53.86 DECREASED RANGE OF MOTION OF LUMBAR SPINE: Status: RESOLVED | Noted: 2019-03-22 | Resolved: 2021-03-08

## 2021-03-08 PROBLEM — M54.50 ACUTE BILATERAL LOW BACK PAIN WITHOUT SCIATICA: Status: RESOLVED | Noted: 2019-03-22 | Resolved: 2021-03-08

## 2021-03-08 PROCEDURE — 97161 PT EVAL LOW COMPLEX 20 MIN: CPT | Mod: PN

## 2021-03-11 PROBLEM — R53.1 DECREASED STRENGTH: Status: ACTIVE | Noted: 2021-03-11

## 2021-03-16 ENCOUNTER — CLINICAL SUPPORT (OUTPATIENT)
Dept: REHABILITATION | Facility: HOSPITAL | Age: 81
End: 2021-03-16
Payer: MEDICARE

## 2021-03-16 DIAGNOSIS — R53.1 DECREASED STRENGTH: ICD-10-CM

## 2021-03-16 DIAGNOSIS — R26.89 BALANCE PROBLEM: Primary | ICD-10-CM

## 2021-03-16 PROCEDURE — 97110 THERAPEUTIC EXERCISES: CPT | Mod: PN

## 2021-03-16 PROCEDURE — 97112 NEUROMUSCULAR REEDUCATION: CPT | Mod: PN

## 2021-03-18 ENCOUNTER — CLINICAL SUPPORT (OUTPATIENT)
Dept: REHABILITATION | Facility: HOSPITAL | Age: 81
End: 2021-03-18
Payer: MEDICARE

## 2021-03-18 DIAGNOSIS — R26.89 BALANCE PROBLEM: ICD-10-CM

## 2021-03-18 DIAGNOSIS — R53.1 DECREASED STRENGTH: ICD-10-CM

## 2021-03-18 PROCEDURE — 97116 GAIT TRAINING THERAPY: CPT | Mod: PN,CQ

## 2021-03-18 PROCEDURE — 97112 NEUROMUSCULAR REEDUCATION: CPT | Mod: PN,CQ

## 2021-03-18 PROCEDURE — 97110 THERAPEUTIC EXERCISES: CPT | Mod: PN,CQ

## 2021-03-25 ENCOUNTER — CLINICAL SUPPORT (OUTPATIENT)
Dept: REHABILITATION | Facility: HOSPITAL | Age: 81
End: 2021-03-25
Payer: MEDICARE

## 2021-03-25 DIAGNOSIS — R26.89 BALANCE PROBLEM: ICD-10-CM

## 2021-03-25 DIAGNOSIS — R53.1 DECREASED STRENGTH: ICD-10-CM

## 2021-03-25 PROCEDURE — 97112 NEUROMUSCULAR REEDUCATION: CPT | Mod: PN,CQ

## 2021-03-25 PROCEDURE — 97116 GAIT TRAINING THERAPY: CPT | Mod: PN,CQ

## 2021-03-25 PROCEDURE — 97110 THERAPEUTIC EXERCISES: CPT | Mod: PN,CQ

## 2021-03-30 ENCOUNTER — CLINICAL SUPPORT (OUTPATIENT)
Dept: REHABILITATION | Facility: HOSPITAL | Age: 81
End: 2021-03-30
Payer: MEDICARE

## 2021-03-30 DIAGNOSIS — R53.1 DECREASED STRENGTH: ICD-10-CM

## 2021-03-30 DIAGNOSIS — R26.89 BALANCE PROBLEM: ICD-10-CM

## 2021-03-30 PROCEDURE — 97116 GAIT TRAINING THERAPY: CPT | Mod: PN,CQ

## 2021-03-30 PROCEDURE — 97112 NEUROMUSCULAR REEDUCATION: CPT | Mod: PN,CQ

## 2021-03-30 PROCEDURE — 97110 THERAPEUTIC EXERCISES: CPT | Mod: PN,CQ

## 2021-05-04 ENCOUNTER — LAB VISIT (OUTPATIENT)
Dept: LAB | Facility: HOSPITAL | Age: 81
End: 2021-05-04
Attending: FAMILY MEDICINE
Payer: MEDICARE

## 2021-05-04 DIAGNOSIS — E11.9 TYPE 2 DIABETES MELLITUS WITHOUT COMPLICATION, WITHOUT LONG-TERM CURRENT USE OF INSULIN: ICD-10-CM

## 2021-05-04 PROCEDURE — 83036 HEMOGLOBIN GLYCOSYLATED A1C: CPT | Performed by: FAMILY MEDICINE

## 2021-05-04 PROCEDURE — 36415 COLL VENOUS BLD VENIPUNCTURE: CPT | Mod: PN | Performed by: FAMILY MEDICINE

## 2021-05-05 LAB
ESTIMATED AVG GLUCOSE: 174 MG/DL (ref 68–131)
HBA1C MFR BLD: 7.7 % (ref 4–5.6)

## 2021-05-10 ENCOUNTER — OFFICE VISIT (OUTPATIENT)
Dept: PRIMARY CARE CLINIC | Facility: CLINIC | Age: 81
End: 2021-05-10
Payer: MEDICARE

## 2021-05-10 VITALS
HEART RATE: 88 BPM | TEMPERATURE: 98 F | HEIGHT: 68 IN | SYSTOLIC BLOOD PRESSURE: 120 MMHG | RESPIRATION RATE: 16 BRPM | WEIGHT: 162.38 LBS | BODY MASS INDEX: 24.61 KG/M2 | DIASTOLIC BLOOD PRESSURE: 64 MMHG

## 2021-05-10 DIAGNOSIS — E11.42 DIABETIC POLYNEUROPATHY ASSOCIATED WITH TYPE 2 DIABETES MELLITUS: Chronic | ICD-10-CM

## 2021-05-10 DIAGNOSIS — R45.89 DEPRESSED MOOD: ICD-10-CM

## 2021-05-10 DIAGNOSIS — R26.89 BALANCE PROBLEM: Primary | ICD-10-CM

## 2021-05-10 DIAGNOSIS — E11.9 TYPE 2 DIABETES MELLITUS WITHOUT COMPLICATION, WITHOUT LONG-TERM CURRENT USE OF INSULIN: ICD-10-CM

## 2021-05-10 DIAGNOSIS — R53.1 DECREASED STRENGTH: ICD-10-CM

## 2021-05-10 PROCEDURE — 3051F PR MOST RECENT HEMOGLOBIN A1C LEVEL 7.0 - < 8.0%: ICD-10-PCS | Mod: S$GLB,,, | Performed by: FAMILY MEDICINE

## 2021-05-10 PROCEDURE — 1101F PT FALLS ASSESS-DOCD LE1/YR: CPT | Mod: S$GLB,,, | Performed by: FAMILY MEDICINE

## 2021-05-10 PROCEDURE — 1159F PR MEDICATION LIST DOCUMENTED IN MEDICAL RECORD: ICD-10-PCS | Mod: S$GLB,,, | Performed by: FAMILY MEDICINE

## 2021-05-10 PROCEDURE — 99215 OFFICE O/P EST HI 40 MIN: CPT | Mod: S$GLB,,, | Performed by: FAMILY MEDICINE

## 2021-05-10 PROCEDURE — 1101F PR PT FALLS ASSESS DOC 0-1 FALLS W/OUT INJ PAST YR: ICD-10-PCS | Mod: S$GLB,,, | Performed by: FAMILY MEDICINE

## 2021-05-10 PROCEDURE — 3051F HG A1C>EQUAL 7.0%<8.0%: CPT | Mod: S$GLB,,, | Performed by: FAMILY MEDICINE

## 2021-05-10 PROCEDURE — 99999 PR PBB SHADOW E&M-EST. PATIENT-LVL IV: ICD-10-PCS | Mod: PBBFAC,,, | Performed by: FAMILY MEDICINE

## 2021-05-10 PROCEDURE — 99999 PR PBB SHADOW E&M-EST. PATIENT-LVL IV: CPT | Mod: PBBFAC,,, | Performed by: FAMILY MEDICINE

## 2021-05-10 PROCEDURE — 99215 PR OFFICE/OUTPT VISIT, EST, LEVL V, 40-54 MIN: ICD-10-PCS | Mod: S$GLB,,, | Performed by: FAMILY MEDICINE

## 2021-05-10 PROCEDURE — 1126F PR PAIN SEVERITY QUANTIFIED, NO PAIN PRESENT: ICD-10-PCS | Mod: S$GLB,,, | Performed by: FAMILY MEDICINE

## 2021-05-10 PROCEDURE — 1159F MED LIST DOCD IN RCRD: CPT | Mod: S$GLB,,, | Performed by: FAMILY MEDICINE

## 2021-05-10 PROCEDURE — 3288F PR FALLS RISK ASSESSMENT DOCUMENTED: ICD-10-PCS | Mod: S$GLB,,, | Performed by: FAMILY MEDICINE

## 2021-05-10 PROCEDURE — 1126F AMNT PAIN NOTED NONE PRSNT: CPT | Mod: S$GLB,,, | Performed by: FAMILY MEDICINE

## 2021-05-10 PROCEDURE — 3288F FALL RISK ASSESSMENT DOCD: CPT | Mod: S$GLB,,, | Performed by: FAMILY MEDICINE

## 2021-05-10 RX ORDER — GLIMEPIRIDE 2 MG/1
2 TABLET ORAL 2 TIMES DAILY
Qty: 180 TABLET | Refills: 1 | Status: SHIPPED | OUTPATIENT
Start: 2021-05-10 | End: 2021-09-21 | Stop reason: SDUPTHER

## 2021-05-10 RX ORDER — ESCITALOPRAM OXALATE 5 MG/1
5 TABLET ORAL DAILY
Qty: 30 TABLET | Refills: 1 | Status: SHIPPED | OUTPATIENT
Start: 2021-05-10 | End: 2021-06-21 | Stop reason: SDUPTHER

## 2021-06-08 LAB
LEFT EYE DM RETINOPATHY: NEGATIVE
RIGHT EYE DM RETINOPATHY: NEGATIVE

## 2021-06-21 ENCOUNTER — OFFICE VISIT (OUTPATIENT)
Dept: PRIMARY CARE CLINIC | Facility: CLINIC | Age: 81
End: 2021-06-21
Payer: MEDICARE

## 2021-06-21 VITALS
WEIGHT: 159.5 LBS | OXYGEN SATURATION: 97 % | BODY MASS INDEX: 24.17 KG/M2 | HEIGHT: 68 IN | SYSTOLIC BLOOD PRESSURE: 118 MMHG | DIASTOLIC BLOOD PRESSURE: 66 MMHG | HEART RATE: 85 BPM | RESPIRATION RATE: 18 BRPM

## 2021-06-21 DIAGNOSIS — E78.2 MIXED HYPERLIPIDEMIA: ICD-10-CM

## 2021-06-21 DIAGNOSIS — E11.9 TYPE 2 DIABETES MELLITUS WITHOUT COMPLICATION, WITHOUT LONG-TERM CURRENT USE OF INSULIN: Primary | ICD-10-CM

## 2021-06-21 DIAGNOSIS — R26.89 BALANCE PROBLEM: ICD-10-CM

## 2021-06-21 DIAGNOSIS — J31.0 RHINITIS, UNSPECIFIED TYPE: ICD-10-CM

## 2021-06-21 DIAGNOSIS — R45.89 DEPRESSED MOOD: ICD-10-CM

## 2021-06-21 PROCEDURE — 1101F PT FALLS ASSESS-DOCD LE1/YR: CPT | Mod: S$GLB,,, | Performed by: FAMILY MEDICINE

## 2021-06-21 PROCEDURE — 1159F PR MEDICATION LIST DOCUMENTED IN MEDICAL RECORD: ICD-10-PCS | Mod: S$GLB,,, | Performed by: FAMILY MEDICINE

## 2021-06-21 PROCEDURE — 1101F PR PT FALLS ASSESS DOC 0-1 FALLS W/OUT INJ PAST YR: ICD-10-PCS | Mod: S$GLB,,, | Performed by: FAMILY MEDICINE

## 2021-06-21 PROCEDURE — 99999 PR PBB SHADOW E&M-EST. PATIENT-LVL III: CPT | Mod: PBBFAC,,, | Performed by: FAMILY MEDICINE

## 2021-06-21 PROCEDURE — 3051F PR MOST RECENT HEMOGLOBIN A1C LEVEL 7.0 - < 8.0%: ICD-10-PCS | Mod: S$GLB,,, | Performed by: FAMILY MEDICINE

## 2021-06-21 PROCEDURE — 3288F PR FALLS RISK ASSESSMENT DOCUMENTED: ICD-10-PCS | Mod: S$GLB,,, | Performed by: FAMILY MEDICINE

## 2021-06-21 PROCEDURE — 3288F FALL RISK ASSESSMENT DOCD: CPT | Mod: S$GLB,,, | Performed by: FAMILY MEDICINE

## 2021-06-21 PROCEDURE — 1159F MED LIST DOCD IN RCRD: CPT | Mod: S$GLB,,, | Performed by: FAMILY MEDICINE

## 2021-06-21 PROCEDURE — 99214 OFFICE O/P EST MOD 30 MIN: CPT | Mod: S$GLB,,, | Performed by: FAMILY MEDICINE

## 2021-06-21 PROCEDURE — 1126F PR PAIN SEVERITY QUANTIFIED, NO PAIN PRESENT: ICD-10-PCS | Mod: S$GLB,,, | Performed by: FAMILY MEDICINE

## 2021-06-21 PROCEDURE — 3051F HG A1C>EQUAL 7.0%<8.0%: CPT | Mod: S$GLB,,, | Performed by: FAMILY MEDICINE

## 2021-06-21 PROCEDURE — 99214 PR OFFICE/OUTPT VISIT, EST, LEVL IV, 30-39 MIN: ICD-10-PCS | Mod: S$GLB,,, | Performed by: FAMILY MEDICINE

## 2021-06-21 PROCEDURE — 1126F AMNT PAIN NOTED NONE PRSNT: CPT | Mod: S$GLB,,, | Performed by: FAMILY MEDICINE

## 2021-06-21 PROCEDURE — 99999 PR PBB SHADOW E&M-EST. PATIENT-LVL III: ICD-10-PCS | Mod: PBBFAC,,, | Performed by: FAMILY MEDICINE

## 2021-06-21 RX ORDER — ESCITALOPRAM OXALATE 10 MG/1
10 TABLET ORAL DAILY
Qty: 30 TABLET | Refills: 3 | Status: SHIPPED | OUTPATIENT
Start: 2021-06-21 | End: 2021-06-21 | Stop reason: SDUPTHER

## 2021-06-21 RX ORDER — ESCITALOPRAM OXALATE 10 MG/1
10 TABLET ORAL DAILY
Qty: 90 TABLET | Refills: 1 | Status: SHIPPED | OUTPATIENT
Start: 2021-06-21 | End: 2021-09-21 | Stop reason: SDUPTHER

## 2021-06-21 RX ORDER — AMOXICILLIN 500 MG/1
500 TABLET, FILM COATED ORAL 3 TIMES DAILY
COMMUNITY
Start: 2021-06-15 | End: 2021-09-21

## 2021-06-22 ENCOUNTER — PATIENT OUTREACH (OUTPATIENT)
Dept: ADMINISTRATIVE | Facility: HOSPITAL | Age: 81
End: 2021-06-22

## 2021-06-23 ENCOUNTER — PATIENT OUTREACH (OUTPATIENT)
Dept: ADMINISTRATIVE | Facility: HOSPITAL | Age: 81
End: 2021-06-23

## 2021-09-14 ENCOUNTER — LAB VISIT (OUTPATIENT)
Dept: LAB | Facility: HOSPITAL | Age: 81
End: 2021-09-14
Attending: FAMILY MEDICINE
Payer: MEDICARE

## 2021-09-14 DIAGNOSIS — E11.9 TYPE 2 DIABETES MELLITUS WITHOUT COMPLICATION, WITHOUT LONG-TERM CURRENT USE OF INSULIN: ICD-10-CM

## 2021-09-14 DIAGNOSIS — E78.2 MIXED HYPERLIPIDEMIA: ICD-10-CM

## 2021-09-14 PROCEDURE — 80053 COMPREHEN METABOLIC PANEL: CPT | Performed by: FAMILY MEDICINE

## 2021-09-14 PROCEDURE — 83036 HEMOGLOBIN GLYCOSYLATED A1C: CPT | Performed by: FAMILY MEDICINE

## 2021-09-14 PROCEDURE — 36415 COLL VENOUS BLD VENIPUNCTURE: CPT | Mod: PN | Performed by: FAMILY MEDICINE

## 2021-09-14 PROCEDURE — 80061 LIPID PANEL: CPT | Performed by: FAMILY MEDICINE

## 2021-09-15 LAB
ALBUMIN SERPL BCP-MCNC: 3.8 G/DL (ref 3.5–5.2)
ALP SERPL-CCNC: 65 U/L (ref 55–135)
ALT SERPL W/O P-5'-P-CCNC: 19 U/L (ref 10–44)
ANION GAP SERPL CALC-SCNC: 15 MMOL/L (ref 8–16)
AST SERPL-CCNC: 18 U/L (ref 10–40)
BILIRUB SERPL-MCNC: 0.5 MG/DL (ref 0.1–1)
BUN SERPL-MCNC: 22 MG/DL (ref 8–23)
CALCIUM SERPL-MCNC: 9.2 MG/DL (ref 8.7–10.5)
CHLORIDE SERPL-SCNC: 105 MMOL/L (ref 95–110)
CHOLEST SERPL-MCNC: 167 MG/DL (ref 120–199)
CHOLEST/HDLC SERPL: 5.2 {RATIO} (ref 2–5)
CO2 SERPL-SCNC: 20 MMOL/L (ref 23–29)
CREAT SERPL-MCNC: 1.3 MG/DL (ref 0.5–1.4)
EST. GFR  (AFRICAN AMERICAN): 58.7 ML/MIN/1.73 M^2
EST. GFR  (NON AFRICAN AMERICAN): 50.8 ML/MIN/1.73 M^2
ESTIMATED AVG GLUCOSE: 137 MG/DL (ref 68–131)
GLUCOSE SERPL-MCNC: 160 MG/DL (ref 70–110)
HBA1C MFR BLD: 6.4 % (ref 4–5.6)
HDLC SERPL-MCNC: 32 MG/DL (ref 40–75)
HDLC SERPL: 19.2 % (ref 20–50)
LDLC SERPL CALC-MCNC: 100.4 MG/DL (ref 63–159)
NONHDLC SERPL-MCNC: 135 MG/DL
POTASSIUM SERPL-SCNC: 3.7 MMOL/L (ref 3.5–5.1)
PROT SERPL-MCNC: 6.9 G/DL (ref 6–8.4)
SODIUM SERPL-SCNC: 140 MMOL/L (ref 136–145)
TRIGL SERPL-MCNC: 173 MG/DL (ref 30–150)

## 2021-09-21 ENCOUNTER — OFFICE VISIT (OUTPATIENT)
Dept: PRIMARY CARE CLINIC | Facility: CLINIC | Age: 81
End: 2021-09-21
Payer: MEDICARE

## 2021-09-21 VITALS
SYSTOLIC BLOOD PRESSURE: 108 MMHG | HEIGHT: 68 IN | RESPIRATION RATE: 18 BRPM | OXYGEN SATURATION: 98 % | BODY MASS INDEX: 23.74 KG/M2 | WEIGHT: 156.63 LBS | DIASTOLIC BLOOD PRESSURE: 56 MMHG | HEART RATE: 85 BPM

## 2021-09-21 DIAGNOSIS — E78.2 MIXED HYPERLIPIDEMIA: Chronic | ICD-10-CM

## 2021-09-21 DIAGNOSIS — E11.9 TYPE 2 DIABETES MELLITUS WITHOUT COMPLICATION, WITHOUT LONG-TERM CURRENT USE OF INSULIN: ICD-10-CM

## 2021-09-21 DIAGNOSIS — G31.84 MILD COGNITIVE IMPAIRMENT: ICD-10-CM

## 2021-09-21 DIAGNOSIS — E78.5 DYSLIPIDEMIA: ICD-10-CM

## 2021-09-21 DIAGNOSIS — N18.30 STAGE 3 CHRONIC KIDNEY DISEASE, UNSPECIFIED WHETHER STAGE 3A OR 3B CKD: Primary | Chronic | ICD-10-CM

## 2021-09-21 PROCEDURE — 3288F FALL RISK ASSESSMENT DOCD: CPT | Mod: S$GLB,,, | Performed by: FAMILY MEDICINE

## 2021-09-21 PROCEDURE — 99999 PR PBB SHADOW E&M-EST. PATIENT-LVL III: ICD-10-PCS | Mod: PBBFAC,,, | Performed by: FAMILY MEDICINE

## 2021-09-21 PROCEDURE — 1160F RVW MEDS BY RX/DR IN RCRD: CPT | Mod: S$GLB,,, | Performed by: FAMILY MEDICINE

## 2021-09-21 PROCEDURE — 99215 PR OFFICE/OUTPT VISIT, EST, LEVL V, 40-54 MIN: ICD-10-PCS | Mod: S$GLB,,, | Performed by: FAMILY MEDICINE

## 2021-09-21 PROCEDURE — 1160F PR REVIEW ALL MEDS BY PRESCRIBER/CLIN PHARMACIST DOCUMENTED: ICD-10-PCS | Mod: S$GLB,,, | Performed by: FAMILY MEDICINE

## 2021-09-21 PROCEDURE — 1159F MED LIST DOCD IN RCRD: CPT | Mod: S$GLB,,, | Performed by: FAMILY MEDICINE

## 2021-09-21 PROCEDURE — 3074F PR MOST RECENT SYSTOLIC BLOOD PRESSURE < 130 MM HG: ICD-10-PCS | Mod: S$GLB,,, | Performed by: FAMILY MEDICINE

## 2021-09-21 PROCEDURE — 99999 PR PBB SHADOW E&M-EST. PATIENT-LVL III: CPT | Mod: PBBFAC,,, | Performed by: FAMILY MEDICINE

## 2021-09-21 PROCEDURE — 99215 OFFICE O/P EST HI 40 MIN: CPT | Mod: S$GLB,,, | Performed by: FAMILY MEDICINE

## 2021-09-21 PROCEDURE — 3288F PR FALLS RISK ASSESSMENT DOCUMENTED: ICD-10-PCS | Mod: S$GLB,,, | Performed by: FAMILY MEDICINE

## 2021-09-21 PROCEDURE — 3074F SYST BP LT 130 MM HG: CPT | Mod: S$GLB,,, | Performed by: FAMILY MEDICINE

## 2021-09-21 PROCEDURE — 1101F PR PT FALLS ASSESS DOC 0-1 FALLS W/OUT INJ PAST YR: ICD-10-PCS | Mod: S$GLB,,, | Performed by: FAMILY MEDICINE

## 2021-09-21 PROCEDURE — 1126F PR PAIN SEVERITY QUANTIFIED, NO PAIN PRESENT: ICD-10-PCS | Mod: S$GLB,,, | Performed by: FAMILY MEDICINE

## 2021-09-21 PROCEDURE — 1101F PT FALLS ASSESS-DOCD LE1/YR: CPT | Mod: S$GLB,,, | Performed by: FAMILY MEDICINE

## 2021-09-21 PROCEDURE — 1159F PR MEDICATION LIST DOCUMENTED IN MEDICAL RECORD: ICD-10-PCS | Mod: S$GLB,,, | Performed by: FAMILY MEDICINE

## 2021-09-21 PROCEDURE — 3078F DIAST BP <80 MM HG: CPT | Mod: S$GLB,,, | Performed by: FAMILY MEDICINE

## 2021-09-21 PROCEDURE — 3078F PR MOST RECENT DIASTOLIC BLOOD PRESSURE < 80 MM HG: ICD-10-PCS | Mod: S$GLB,,, | Performed by: FAMILY MEDICINE

## 2021-09-21 PROCEDURE — 1126F AMNT PAIN NOTED NONE PRSNT: CPT | Mod: S$GLB,,, | Performed by: FAMILY MEDICINE

## 2021-09-21 RX ORDER — LORATADINE 10 MG/1
10 TABLET ORAL DAILY
COMMUNITY
End: 2022-05-23

## 2021-09-21 RX ORDER — PRAVASTATIN SODIUM 20 MG/1
20 TABLET ORAL DAILY
Qty: 90 TABLET | Refills: 3 | Status: SHIPPED | OUTPATIENT
Start: 2021-09-21 | End: 2022-01-24 | Stop reason: SDUPTHER

## 2021-09-21 RX ORDER — GLIMEPIRIDE 2 MG/1
2 TABLET ORAL 2 TIMES DAILY
Qty: 180 TABLET | Refills: 3 | Status: SHIPPED | OUTPATIENT
Start: 2021-09-21 | End: 2022-01-24 | Stop reason: SDUPTHER

## 2021-09-21 RX ORDER — ESCITALOPRAM OXALATE 10 MG/1
10 TABLET ORAL DAILY
Qty: 90 TABLET | Refills: 3 | Status: SHIPPED | OUTPATIENT
Start: 2021-09-21 | End: 2022-01-24 | Stop reason: SDUPTHER

## 2021-09-29 ENCOUNTER — IMMUNIZATION (OUTPATIENT)
Dept: FAMILY MEDICINE | Facility: CLINIC | Age: 81
End: 2021-09-29
Payer: MEDICARE

## 2021-09-29 DIAGNOSIS — Z23 NEED FOR VACCINATION: Primary | ICD-10-CM

## 2021-09-29 PROCEDURE — 91300 COVID-19, MRNA, LNP-S, PF, 30 MCG/0.3 ML DOSE VACCINE: CPT | Mod: PBBFAC | Performed by: FAMILY MEDICINE

## 2021-09-29 PROCEDURE — 0003A COVID-19, MRNA, LNP-S, PF, 30 MCG/0.3 ML DOSE VACCINE: CPT | Mod: PBBFAC | Performed by: FAMILY MEDICINE

## 2021-10-18 ENCOUNTER — IMMUNIZATION (OUTPATIENT)
Dept: PHARMACY | Facility: CLINIC | Age: 81
End: 2021-10-18
Payer: MEDICARE

## 2022-01-18 ENCOUNTER — LAB VISIT (OUTPATIENT)
Dept: LAB | Facility: HOSPITAL | Age: 82
End: 2022-01-18
Attending: FAMILY MEDICINE
Payer: MEDICARE

## 2022-01-18 DIAGNOSIS — E78.5 DYSLIPIDEMIA: ICD-10-CM

## 2022-01-18 DIAGNOSIS — E11.9 TYPE 2 DIABETES MELLITUS WITHOUT COMPLICATION, WITHOUT LONG-TERM CURRENT USE OF INSULIN: ICD-10-CM

## 2022-01-18 LAB
ALBUMIN SERPL BCP-MCNC: 3.8 G/DL (ref 3.5–5.2)
ALP SERPL-CCNC: 69 U/L (ref 55–135)
ALT SERPL W/O P-5'-P-CCNC: 15 U/L (ref 10–44)
ANION GAP SERPL CALC-SCNC: 9 MMOL/L (ref 8–16)
AST SERPL-CCNC: 15 U/L (ref 10–40)
BILIRUB SERPL-MCNC: 0.6 MG/DL (ref 0.1–1)
BUN SERPL-MCNC: 19 MG/DL (ref 8–23)
CALCIUM SERPL-MCNC: 9.3 MG/DL (ref 8.7–10.5)
CHLORIDE SERPL-SCNC: 108 MMOL/L (ref 95–110)
CHOLEST SERPL-MCNC: 186 MG/DL (ref 120–199)
CHOLEST/HDLC SERPL: 5.3 {RATIO} (ref 2–5)
CO2 SERPL-SCNC: 24 MMOL/L (ref 23–29)
CREAT SERPL-MCNC: 1.4 MG/DL (ref 0.5–1.4)
EST. GFR  (AFRICAN AMERICAN): 53.7 ML/MIN/1.73 M^2
EST. GFR  (NON AFRICAN AMERICAN): 46.5 ML/MIN/1.73 M^2
ESTIMATED AVG GLUCOSE: 148 MG/DL (ref 68–131)
GLUCOSE SERPL-MCNC: 141 MG/DL (ref 70–110)
HBA1C MFR BLD: 6.8 % (ref 4–5.6)
HDLC SERPL-MCNC: 35 MG/DL (ref 40–75)
HDLC SERPL: 18.8 % (ref 20–50)
LDLC SERPL CALC-MCNC: 128.4 MG/DL (ref 63–159)
NONHDLC SERPL-MCNC: 151 MG/DL
POTASSIUM SERPL-SCNC: 4 MMOL/L (ref 3.5–5.1)
PROT SERPL-MCNC: 7.1 G/DL (ref 6–8.4)
SODIUM SERPL-SCNC: 141 MMOL/L (ref 136–145)
TRIGL SERPL-MCNC: 113 MG/DL (ref 30–150)
TSH SERPL DL<=0.005 MIU/L-ACNC: 3.17 UIU/ML (ref 0.4–4)

## 2022-01-18 PROCEDURE — 36415 COLL VENOUS BLD VENIPUNCTURE: CPT | Mod: PN | Performed by: FAMILY MEDICINE

## 2022-01-18 PROCEDURE — 80053 COMPREHEN METABOLIC PANEL: CPT | Performed by: FAMILY MEDICINE

## 2022-01-18 PROCEDURE — 80061 LIPID PANEL: CPT | Performed by: FAMILY MEDICINE

## 2022-01-18 PROCEDURE — 83036 HEMOGLOBIN GLYCOSYLATED A1C: CPT | Performed by: FAMILY MEDICINE

## 2022-01-18 PROCEDURE — 84443 ASSAY THYROID STIM HORMONE: CPT | Performed by: FAMILY MEDICINE

## 2022-01-24 ENCOUNTER — OFFICE VISIT (OUTPATIENT)
Dept: DERMATOLOGY | Facility: CLINIC | Age: 82
End: 2022-01-24
Payer: MEDICARE

## 2022-01-24 ENCOUNTER — TELEPHONE (OUTPATIENT)
Dept: DERMATOLOGY | Facility: CLINIC | Age: 82
End: 2022-01-24
Payer: MEDICARE

## 2022-01-24 ENCOUNTER — OFFICE VISIT (OUTPATIENT)
Dept: PRIMARY CARE CLINIC | Facility: CLINIC | Age: 82
End: 2022-01-24
Payer: MEDICARE

## 2022-01-24 VITALS — BODY MASS INDEX: 25.31 KG/M2 | WEIGHT: 167 LBS | HEIGHT: 68 IN

## 2022-01-24 VITALS
SYSTOLIC BLOOD PRESSURE: 120 MMHG | BODY MASS INDEX: 25.35 KG/M2 | DIASTOLIC BLOOD PRESSURE: 82 MMHG | OXYGEN SATURATION: 99 % | WEIGHT: 167.25 LBS | HEIGHT: 68 IN | RESPIRATION RATE: 18 BRPM | HEART RATE: 67 BPM

## 2022-01-24 DIAGNOSIS — J30.89 ALLERGIC RHINITIS DUE TO OTHER ALLERGIC TRIGGER, UNSPECIFIED SEASONALITY: ICD-10-CM

## 2022-01-24 DIAGNOSIS — E11.9 TYPE 2 DIABETES MELLITUS WITHOUT COMPLICATION, WITHOUT LONG-TERM CURRENT USE OF INSULIN: ICD-10-CM

## 2022-01-24 DIAGNOSIS — E11.42 TYPE 2 DIABETES MELLITUS WITH DIABETIC POLYNEUROPATHY, WITHOUT LONG-TERM CURRENT USE OF INSULIN: ICD-10-CM

## 2022-01-24 DIAGNOSIS — R26.89 BALANCE PROBLEM: ICD-10-CM

## 2022-01-24 DIAGNOSIS — E78.2 MIXED HYPERLIPIDEMIA: Chronic | ICD-10-CM

## 2022-01-24 DIAGNOSIS — N18.30 STAGE 3 CHRONIC KIDNEY DISEASE, UNSPECIFIED WHETHER STAGE 3A OR 3B CKD: Primary | Chronic | ICD-10-CM

## 2022-01-24 DIAGNOSIS — L98.9 SCALP LESION: ICD-10-CM

## 2022-01-24 DIAGNOSIS — R05.9 COUGH: ICD-10-CM

## 2022-01-24 DIAGNOSIS — G31.84 MILD COGNITIVE IMPAIRMENT: ICD-10-CM

## 2022-01-24 DIAGNOSIS — E11.42 DIABETIC POLYNEUROPATHY ASSOCIATED WITH TYPE 2 DIABETES MELLITUS: Chronic | ICD-10-CM

## 2022-01-24 DIAGNOSIS — E78.5 DYSLIPIDEMIA: ICD-10-CM

## 2022-01-24 DIAGNOSIS — I70.0 AORTIC ATHEROSCLEROSIS: ICD-10-CM

## 2022-01-24 DIAGNOSIS — I73.9 PVD (PERIPHERAL VASCULAR DISEASE): Chronic | ICD-10-CM

## 2022-01-24 PROBLEM — E11.49 TYPE 2 DIABETES MELLITUS WITH NEUROLOGIC COMPLICATION, WITHOUT LONG-TERM CURRENT USE OF INSULIN: Chronic | Status: ACTIVE | Noted: 2021-05-10

## 2022-01-24 PROBLEM — E11.49 TYPE 2 DIABETES MELLITUS WITH NEUROLOGIC COMPLICATION, WITHOUT LONG-TERM CURRENT USE OF INSULIN: Status: ACTIVE | Noted: 2021-05-10

## 2022-01-24 LAB
ALBUMIN/CREAT UR: 12.1 UG/MG (ref 0–30)
CREAT UR-MCNC: 322 MG/DL (ref 23–375)
MICROALBUMIN UR DL<=1MG/L-MCNC: 39 UG/ML

## 2022-01-24 PROCEDURE — 99204 OFFICE O/P NEW MOD 45 MIN: CPT | Mod: S$GLB,,, | Performed by: DERMATOLOGY

## 2022-01-24 PROCEDURE — 99999 PR PBB SHADOW E&M-EST. PATIENT-LVL IV: CPT | Mod: PBBFAC,,, | Performed by: FAMILY MEDICINE

## 2022-01-24 PROCEDURE — 99999 PR PBB SHADOW E&M-EST. PATIENT-LVL IV: CPT | Mod: PBBFAC,,, | Performed by: DERMATOLOGY

## 2022-01-24 PROCEDURE — 82570 ASSAY OF URINE CREATININE: CPT | Performed by: FAMILY MEDICINE

## 2022-01-24 PROCEDURE — 99215 PR OFFICE/OUTPT VISIT, EST, LEVL V, 40-54 MIN: ICD-10-PCS | Mod: S$GLB,,, | Performed by: FAMILY MEDICINE

## 2022-01-24 PROCEDURE — 99999 PR PBB SHADOW E&M-EST. PATIENT-LVL IV: ICD-10-PCS | Mod: PBBFAC,,, | Performed by: FAMILY MEDICINE

## 2022-01-24 PROCEDURE — 82043 UR ALBUMIN QUANTITATIVE: CPT | Performed by: FAMILY MEDICINE

## 2022-01-24 PROCEDURE — 99215 OFFICE O/P EST HI 40 MIN: CPT | Mod: S$GLB,,, | Performed by: FAMILY MEDICINE

## 2022-01-24 PROCEDURE — 99204 PR OFFICE/OUTPT VISIT, NEW, LEVL IV, 45-59 MIN: ICD-10-PCS | Mod: S$GLB,,, | Performed by: DERMATOLOGY

## 2022-01-24 PROCEDURE — 99999 PR PBB SHADOW E&M-EST. PATIENT-LVL IV: ICD-10-PCS | Mod: PBBFAC,,, | Performed by: DERMATOLOGY

## 2022-01-24 RX ORDER — DOXYCYCLINE 100 MG/1
TABLET ORAL
Qty: 14 TABLET | Refills: 0 | Status: SHIPPED | OUTPATIENT
Start: 2022-01-24 | End: 2022-09-19

## 2022-01-24 RX ORDER — PRAVASTATIN SODIUM 20 MG/1
20 TABLET ORAL DAILY
Qty: 90 TABLET | Refills: 3 | Status: SHIPPED | OUTPATIENT
Start: 2022-01-24 | End: 2023-01-23 | Stop reason: SDUPTHER

## 2022-01-24 RX ORDER — GLIMEPIRIDE 2 MG/1
2 TABLET ORAL 2 TIMES DAILY
Qty: 180 TABLET | Refills: 3 | Status: SHIPPED | OUTPATIENT
Start: 2022-01-24 | End: 2022-05-23

## 2022-01-24 RX ORDER — CLOBETASOL PROPIONATE 0.5 MG/G
OINTMENT TOPICAL
Qty: 30 G | Refills: 1 | Status: SHIPPED | OUTPATIENT
Start: 2022-01-24 | End: 2022-09-19

## 2022-01-24 RX ORDER — ESCITALOPRAM OXALATE 10 MG/1
10 TABLET ORAL DAILY
Qty: 90 TABLET | Refills: 3 | Status: SHIPPED | OUTPATIENT
Start: 2022-01-24 | End: 2023-01-23 | Stop reason: SDUPTHER

## 2022-01-24 NOTE — TELEPHONE ENCOUNTER
----- Message from Jasmin Lynn MD sent at 1/24/2022  2:44 PM CST -----  Can he come this afternoon? Open slots     ----- Message -----  From: Irina Velasquez MA  Sent: 1/24/2022   1:34 PM CST  To: Mason West MD, #    Good Afternoon,   Mr. Cespedes is scheduled with you at the end of March for a possible cancerous lesion. Dr. West has asked me to reach out to you to see if you could possibly get this patient worked in sooner. If so, please reach out to the patients wife. Thank you so much.

## 2022-01-24 NOTE — ASSESSMENT & PLAN NOTE
Mild increase in A1c at 6.8%.  No hypoglycemia, remains on glimepiride 2 mg twice a day.  Neuropathy appears fairly stable.  Considered options regarding continue a sulfonylurea or switching to a shorter acting agent.  Given the fact that he is stable and has not had any issues and other diabetic medications are limited because of kidney function, cost concerns, and desire to avoid injectables, will continue the same for now but monitor

## 2022-01-24 NOTE — ASSESSMENT & PLAN NOTE
Fairly stable.  Not on metformin.  Not on any NSAIDs.  Discussed adequate fluid intake and monitoring.

## 2022-01-24 NOTE — ASSESSMENT & PLAN NOTE
He continues to use a cane.  He denies any falls or accidents.  He does not feel he needs or desires physical therapy at this time

## 2022-01-24 NOTE — PROGRESS NOTES
THIS DOCUMENT WAS MADE IN PART WITH VOICE RECOGNITION SOFTWARE.  OCCASIONALLY THIS SOFTWARE WILL MISINTERPRET WORDS OR PHRASES.      Primary Care Provider Appointment - Swapnil LOPEZ [facility] ([facility])      Patient ID: Cecilio Cespedes is a 81 y.o. male.    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  No hypoglycemia, remains on glimepiride 2 mg twice a day.  Neuropathy appears fairly stable.  Considered options regarding continue a sulfonylurea or switching to a shorter acting agent.  Given the fact that he is stable and has not had any issues and other diabetic medications are limited because of kidney function, cost concerns, and desire to avoid injectables, will continue the same for now but monitor         Relevant Medications    glimepiride (AMARYL) 2 MG tablet    Other Relevant Orders    Microalbumin/Creatinine Ratio, Urine    Hemoglobin A1C    Comprehensive Metabolic Panel    Stage 3 chronic kidney disease - Primary (Chronic)     Fairly stable.  Not on metformin.  Not on any NSAIDs.  Discussed adequate fluid intake and monitoring.         Relevant Orders    Hemoglobin A1C    Comprehensive Metabolic Panel    Mixed hyperlipidemia (Chronic)     Mild increase in lipids, room to improve diet.  Remains on pravastatin 20 mg daily.         PVD (peripheral vascular disease) (Chronic)     Stable         Aortic atherosclerosis (Chronic)     Continue pravastatin.         Balance problem     He continues to use a cane.  He denies any falls or accidents.  He does not feel he needs or desires physical therapy at this time         Mild cognitive impairment     Stable.  His wife assists with his  care           Other Visit Diagnoses     Scalp lesion        Relevant Orders    Ambulatory referral/consult to Dermatology  The earliest Dermatology appointment we can find in the system is March 23rd.  We will call the department to see if there is a wait list.  Unfortunately he apparently is restricted by his insurance as to where he can go and must wait for an [facility] physician so will reach out to see if anything can be done sooner.  The lesion is concerning for malignancy    Dyslipidemia        Relevant Medications    pravastatin (PRAVACHOL) 20 MG tablet    Type 2 diabetes mellitus without complication, without long-term current use of insulin        Relevant Medications    glimepiride (AMARYL) 2 MG tablet    Allergic rhinitis due to other allergic trigger, unspecified seasonality        Cough       Suspect cough is related to allergies.  Will try Flonase.  Discontinue Claritin and try Zyrtec or Allegra.  If symptoms do not improve re-evaluate.  If there is any worsening or significant new symptoms they should let us know right away.       Follow Up:  Four months    Forty-six minutes of total time spent on the encounter, which includes face to face time and non-face to face time preparing to see the patient (eg, review of tests), Obtaining and/or reviewing separately obtained history, Documenting clinical information in the electronic or other health record, Independently interpreting results (not separately reported) and communicating results to the patient/family/caregiver, or Care coordination (not separately reported).    Health Maintenance       Date Due Completion Date    Shingles Vaccine (2 of 3) 09/09/2016 7/15/2016    Diabetes Urine Screening 01/14/2022 1/14/2021    Foot Exam 03/02/2022 3/2/2021    Eye Exam 06/08/2022 6/8/2021    Override on 11/17/2016: Done ([facility])    Override on 11/3/2015: Done    Hemoglobin A1c 07/18/2022 1/18/2022    Lipid Panel 01/18/2023 1/18/2022    TETANUS  VACCINE 06/12/2024 6/12/2014 (Done)    Override on 6/12/2014: Done (Completed at [facility] for cut - dsn't remember specific date)              Subjective:     Chief Complaint   Patient presents with    Diabetes     F/u visit     Chronic Kidney Disease     F/u visit      I have reviewed the information entered by the ancillary staff regarding the chief complaint as well as the related history.    HPI    Patient is a/an 81 y.o.  male   RISK of ADMIT/ED: 4    Follow-up diabetes.  He is here with his wife.  Apparently he has been well and reports no acute changes or concerns.  No reported hypoglycemia but no home readings to review.  He has been eating a little bit more and has gained weight.  Recently we have been concerned about weight loss.  There has been a mild increase in his A1c and lipids.  See above for all specific details    He does report a cough, several months if not longer.  Possibly worse in the morning.  Mostly dry cough, occasional non purulent sputum.  No fever or chills.  He does have nasal congestion and runny nose.  Denies heartburn.  He is not on an ACE-inhibitor.    He does report a lesion on his scalp that is not improving.  He saw his dermatologist Dr. Kelsy Waldron late last year and was prescribed fluorouracil cream.  But apparently there was some redness and blistering and he was advised to discontinue.  His wife has been applying a topical steroid in ketoconazole since discontinuing the fluorouracil cream.  Nothing seems to help.    Although he cannot go back to his previous dermatologist because of a change in insurance and coverage.      Active Ambulatory Problems     Diagnosis Date Noted    Mixed hyperlipidemia 03/24/2014    Diabetic polyneuropathy associated with type 2 diabetes mellitus 01/30/2018    Heloma molle - Right Foot 09/11/2018    Hammer toe of right foot - Right Foot 09/11/2018    Pain in toe of right foot - Right Foot 09/11/2018    PVD (peripheral vascular disease)  2018    SOB (shortness of breath) on exertion 10/15/2018    Stage 3 chronic kidney disease 2018    Balance problem 2019    Change in bowel habits 2020    Decreased strength 2021    Type 2 diabetes mellitus with neurologic complication, without long-term current use of insulin 05/10/2021    Depressed mood 05/10/2021    Mild cognitive impairment 2022    Aortic atherosclerosis 2022     Resolved Ambulatory Problems     Diagnosis Date Noted    Tobacco dependence 07/15/2016    Decreased range of motion of lumbar spine 2019    Acute bilateral low back pain without sciatica 2019    Limitation of joint motion of lumbar spine 2019    Gait abnormality 2019     Past Medical History:   Diagnosis Date    Colon polyp     Diabetes     Diverticulosis     Hyperlipidemia     PONV (postoperative nausea and vomiting)     Type 2 diabetes mellitus        Past Surgical History:   Procedure Laterality Date    COLONOSCOPY  2011    Dr. Dejesus: 2 colon polyps removed (one was 10 mm), diverticulosis, hemorrhoids, repeat in 3 years for surveillance; biopsy: FRAGMENTS OF TUBULAR ADENOMA.    COLONOSCOPY N/A 2020    Procedure: COLONOSCOPY;  Surgeon: Denny Dejesus MD;  Location: [facility];  Service: Endoscopy;  Laterality: N/A;    HERNIA REPAIR         Past Medical History:   Diagnosis Date    Colon polyp     Diabetes 2001    Diverticulosis     Hyperlipidemia     PONV (postoperative nausea and vomiting)     Stage 3 chronic kidney disease 2018    Type 2 diabetes mellitus        Social History     Socioeconomic History    Marital status:    Tobacco Use    Smoking status: Former Smoker     Packs/day: 2.00     Years: 20.00     Pack years: 40.00     Types: Cigars     Quit date: 2016     Years since quittin.4    Smokeless tobacco: Never Used    Tobacco comment: 2 cigars per day   Substance and Sexual Activity     "Alcohol use: Yes     Comment: occasionally- not on a weekly basis    Drug use: No       Review of Systems   Constitutional: Positive for unexpected weight change (Increase).   HENT: Positive for congestion and rhinorrhea.    Eyes: Negative.    Respiratory: Positive for cough.    Cardiovascular: Negative.    Gastrointestinal: Negative.    Genitourinary: Negative.    Musculoskeletal: Positive for gait problem.   Neurological: Positive for weakness.       Objective     Physical Exam  Constitutional:       General: He is not in acute distress.     Appearance: He is well-developed. He is not diaphoretic.   HENT:      Head: Normocephalic and atraumatic.        Right Ear: Ear canal and external ear normal.      Left Ear: Ear canal and external ear normal.      Nose: Congestion present.   Eyes:      General: No scleral icterus.     Conjunctiva/sclera: Conjunctivae normal.   Cardiovascular:      Rate and Rhythm: Normal rate and regular rhythm.      Heart sounds: Murmur heard.       Pulmonary:      Effort: Pulmonary effort is normal. No respiratory distress.      Breath sounds: No wheezing or rales.   Neurological:      Mental Status: He is alert.      Coordination: Coordination normal.      Comments: He is using a cane but did use a wheelchair to transport back to the room.     Psychiatric:         Behavior: Behavior normal.       Vitals:    01/24/22 1100   BP: 120/82   BP Location: Right arm   Patient Position: Sitting   BP Method: Medium (Manual)   Pulse: 67   Resp: 18   SpO2: 99%   Weight: 75.8 kg (167 lb 3.5 oz)   Height: 5' 8" (1.727 m)       RECENT LABS:    Lab Results   Component Value Date    WBC 8.13 01/14/2021    HGB 14.9 01/14/2021    HCT 46.5 01/14/2021     01/14/2021    CHOL 186 01/18/2022    TRIG 113 01/18/2022    HDL 35 (L) 01/18/2022    ALT 15 01/18/2022    AST 15 01/18/2022     01/18/2022    K 4.0 01/18/2022     01/18/2022    CREATININE 1.4 01/18/2022    BUN 19 01/18/2022    CO2 24 " 01/18/2022    TSH 3.170 01/18/2022    PSA 1.3 03/12/2019    HGBA1C 6.8 (H) 01/18/2022       Results for orders placed or performed in visit on 01/18/22   Hemoglobin A1C   Result Value Ref Range    Hemoglobin A1C 6.8 (H) 4.0 - 5.6 %    Estimated Avg Glucose 148 (H) 68 - 131 mg/dL   Comprehensive Metabolic Panel   Result Value Ref Range    Sodium 141 136 - 145 mmol/L    Potassium 4.0 3.5 - 5.1 mmol/L    Chloride 108 95 - 110 mmol/L    CO2 24 23 - 29 mmol/L    Glucose 141 (H) 70 - 110 mg/dL    BUN 19 8 - 23 mg/dL    Creatinine 1.4 0.5 - 1.4 mg/dL    Calcium 9.3 8.7 - 10.5 mg/dL    Total Protein 7.1 6.0 - 8.4 g/dL    Albumin 3.8 3.5 - 5.2 g/dL    Total Bilirubin 0.6 0.1 - 1.0 mg/dL    Alkaline Phosphatase 69 55 - 135 U/L    AST 15 10 - 40 U/L    ALT 15 10 - 44 U/L    Anion Gap 9 8 - 16 mmol/L    eGFR if African American 53.7 (A) >60 mL/min/1.73 m^2    eGFR if non  46.5 (A) >60 mL/min/1.73 m^2   Lipid Panel   Result Value Ref Range    Cholesterol 186 120 - 199 mg/dL    Triglycerides 113 30 - 150 mg/dL    HDL 35 (L) 40 - 75 mg/dL    LDL Cholesterol 128.4 63.0 - 159.0 mg/dL    HDL/Cholesterol Ratio 18.8 (L) 20.0 - 50.0 %    Total Cholesterol/HDL Ratio 5.3 (H) 2.0 - 5.0    Non-HDL Cholesterol 151 mg/dL   TSH   Result Value Ref Range    TSH 3.170 0.400 - 4.000 uIU/mL

## 2022-01-24 NOTE — TELEPHONE ENCOUNTER
Spoke with patient and wife, offered an appointment with Dr Lynn in [place] this afternoon at 4 pm.  Appointment scheduled.   EMS

## 2022-01-24 NOTE — PROGRESS NOTES
Subjective:       Patient ID:  Cecilio Cespedes is a 81 y.o. male who presents for   Chief Complaint   Patient presents with    Spot     LOV: 12/21/20 - GWYN Valdez    C/o spot on the scalp  1 month, painful  Was given Fluorouracil 5% cream to use by Dr. Matthews in [place]  Stopped using after about 1 week, last application at least 2-3 weeks ago  Used hydrocortisone cream for the itching    Derm Hx:  Denies phx NMSC, MM  Denies fhx NMSC, MM      Current Outpatient Medications:     aspirin-sod bicarb-citric acid (CHASE-SELTZER) 324 mg TbEF, Take 325 mg by mouth every 6 (six) hours as needed., Disp: , Rfl:     bisacodyl (DULCOLAX) 5 mg EC tablet, Take 5 mg by mouth daily as needed for Constipation., Disp: , Rfl:     blood sugar diagnostic Strp, Dispense with lancets of choice to check bid  Dx code e11.42, Disp: 200 each, Rfl: prn    EScitalopram oxalate (LEXAPRO) 10 MG tablet, Take 1 tablet (10 mg total) by mouth once daily., Disp: 90 tablet, Rfl: 3    glimepiride (AMARYL) 2 MG tablet, Take 1 tablet (2 mg total) by mouth 2 (two) times a day., Disp: 180 tablet, Rfl: 3    lancets Misc, To check BG 2 times daily, to use with insurance preferred meter, Disp: 200 each, Rfl: 3    loratadine (CLARITIN) 10 mg tablet, Take 10 mg by mouth once daily., Disp: , Rfl:     pravastatin (PRAVACHOL) 20 MG tablet, Take 1 tablet (20 mg total) by mouth once daily., Disp: 90 tablet, Rfl: 3    sodium phosphates (FLEET PEDIATRIC) 9.5-3.5 gram/59 mL Enem, Place 1 enema rectally as needed., Disp: , Rfl:          Review of Systems   Constitutional: Negative for fever, chills and fatigue.   Respiratory: Negative for cough and shortness of breath.    Skin: Positive for itching and wears hat. Negative for rash.   Hematologic/Lymphatic: Bruises/bleeds easily.        Objective:    Physical Exam   Constitutional: He appears well-developed and well-nourished.   Neurological: He is alert and oriented to person, place, and  time.   Psychiatric: He has a normal mood and affect.   Skin:   Areas Examined (abnormalities noted in diagram):   Scalp / Hair Palpated and Inspected              Diagram Legend     Erythematous scaling macule/papule c/w actinic keratosis       Vascular papule c/w angioma      Pigmented verrucoid papule/plaque c/w seborrheic keratosis      Yellow umbilicated papule c/w sebaceous hyperplasia      Irregularly shaped tan macule c/w lentigo     1-2 mm smooth white papules consistent with Milia      Movable subcutaneous cyst with punctum c/w epidermal inclusion cyst      Subcutaneous movable cyst c/w pilar cyst      Firm pink to brown papule c/w dermatofibroma      Pedunculated fleshy papule(s) c/w skin tag(s)      Evenly pigmented macule c/w junctional nevus     Mildly variegated pigmented, slightly irregular-bordered macule c/w mildly atypical nevus      Flesh colored to evenly pigmented papule c/w intradermal nevus       Pink pearly papule/plaque c/w basal cell carcinoma      Erythematous hyperkeratotic cursted plaque c/w SCC      Surgical scar with no sign of skin cancer recurrence      Open and closed comedones      Inflammatory papules and pustules      Verrucoid papule consistent consistent with wart     Erythematous eczematous patches and plaques     Dystrophic onycholytic nail with subungual debris c/w onychomycosis     Umbilicated papule    Erythematous-base heme-crusted tan verrucoid plaque consistent with inflamed seborrheic keratosis     Erythematous Silvery Scaling Plaque c/w Psoriasis     See annotation          Assessment / Plan:        Scalp lesion  -     Ambulatory referral/consult to Dermatology  -     doxycycline monohydrate 100 mg Tab; Take 1 po BID x 7 days, take with food and tall glass of water  Dispense: 14 tablet; Refill: 0  -     clobetasol 0.05% (TEMOVATE) 0.05 % Oint; Apply thin film to AA on scalp BID  Dispense: 30 g; Refill: 1    dermatitis medicamentosa? Vs erosive derm of  scalp?  Discussed both entities  Less likely is SCC  Treat as above   reevaluate in 2 weeks   biopsy if not significantly improved    Discussed benefits and risks of doxycyline therapy including but not limited to GI discomfort, esophageal irritation/ulceration, and increased sun sensitivity. Patient was counseled to take medicine with meals and at least 1 hour before lying down.            No follow-ups on file.

## 2022-02-11 ENCOUNTER — OFFICE VISIT (OUTPATIENT)
Dept: DERMATOLOGY | Facility: CLINIC | Age: 82
End: 2022-02-11
Payer: MEDICARE

## 2022-02-11 DIAGNOSIS — L98.8 EROSIVE PUSTULAR DERMATOSIS OF SCALP: Primary | ICD-10-CM

## 2022-02-11 PROCEDURE — 99212 OFFICE O/P EST SF 10 MIN: CPT | Mod: S$GLB,,, | Performed by: DERMATOLOGY

## 2022-02-11 PROCEDURE — 99212 PR OFFICE/OUTPT VISIT, EST, LEVL II, 10-19 MIN: ICD-10-PCS | Mod: S$GLB,,, | Performed by: DERMATOLOGY

## 2022-02-11 PROCEDURE — 99999 PR PBB SHADOW E&M-EST. PATIENT-LVL II: ICD-10-PCS | Mod: PBBFAC,,, | Performed by: DERMATOLOGY

## 2022-02-11 PROCEDURE — 99999 PR PBB SHADOW E&M-EST. PATIENT-LVL II: CPT | Mod: PBBFAC,,, | Performed by: DERMATOLOGY

## 2022-02-11 NOTE — PROGRESS NOTES
Subjective:       Patient ID:  Cecilio Cespedes is a 81 y.o. male who presents for   Chief Complaint   Patient presents with    Follow-up     scalp     LOV 1/24/22    Patient here today to recheck spot on scalp  Patient states he is doing much better  Patient completed course of Doxycycline for 7 days  Using Clobetasol ointment BID    Derm Hx:  Denies phx NMSC, MM  Denies fhx NMSC, MM      Review of Systems   Constitutional: Negative for fever, chills and fatigue.   Respiratory: Negative for cough and shortness of breath.    Gastrointestinal: Negative for nausea and vomiting.   Skin: Positive for itching and wears hat. Negative for rash.   Hematologic/Lymphatic: Bruises/bleeds easily.        Objective:    Physical Exam   Constitutional: He appears well-developed and well-nourished.   Neurological: He is alert and oriented to person, place, and time.   Psychiatric: He has a normal mood and affect.   Skin:   Areas Examined (abnormalities noted in diagram):   Scalp / Hair Palpated and Inspected              Diagram Legend     Erythematous scaling macule/papule c/w actinic keratosis       Vascular papule c/w angioma      Pigmented verrucoid papule/plaque c/w seborrheic keratosis      Yellow umbilicated papule c/w sebaceous hyperplasia      Irregularly shaped tan macule c/w lentigo     1-2 mm smooth white papules consistent with Milia      Movable subcutaneous cyst with punctum c/w epidermal inclusion cyst      Subcutaneous movable cyst c/w pilar cyst      Firm pink to brown papule c/w dermatofibroma      Pedunculated fleshy papule(s) c/w skin tag(s)      Evenly pigmented macule c/w junctional nevus     Mildly variegated pigmented, slightly irregular-bordered macule c/w mildly atypical nevus      Flesh colored to evenly pigmented papule c/w intradermal nevus       Pink pearly papule/plaque c/w basal cell carcinoma      Erythematous hyperkeratotic cursted plaque c/w SCC      Surgical scar with no sign of skin cancer  recurrence      Open and closed comedones      Inflammatory papules and pustules      Verrucoid papule consistent consistent with wart     Erythematous eczematous patches and plaques     Dystrophic onycholytic nail with subungual debris c/w onychomycosis     Umbilicated papule    Erythematous-base heme-crusted tan verrucoid plaque consistent with inflamed seborrheic keratosis     Erythematous Silvery Scaling Plaque c/w Psoriasis     See annotation      Assessment / Plan:        Erosive pustular dermatosis of scalp    resolved with doxy x 1 week and clobetasol ointment  Discontinue both  vaseline and strict sun protection           No follow-ups on file.

## 2022-04-12 ENCOUNTER — IMMUNIZATION (OUTPATIENT)
Dept: FAMILY MEDICINE | Facility: CLINIC | Age: 82
End: 2022-04-12
Payer: MEDICARE

## 2022-04-12 DIAGNOSIS — Z23 NEED FOR VACCINATION: Primary | ICD-10-CM

## 2022-04-12 PROCEDURE — 91305 COVID-19, MRNA, LNP-S, PF, 30 MCG/0.3 ML DOSE VACCINE (PFIZER): CPT | Mod: PBBFAC | Performed by: FAMILY MEDICINE

## 2022-05-11 ENCOUNTER — TELEPHONE (OUTPATIENT)
Dept: PHARMACY | Facility: CLINIC | Age: 82
End: 2022-05-11
Payer: MEDICARE

## 2022-05-16 ENCOUNTER — LAB VISIT (OUTPATIENT)
Dept: LAB | Facility: HOSPITAL | Age: 82
End: 2022-05-16
Attending: FAMILY MEDICINE
Payer: MEDICARE

## 2022-05-16 DIAGNOSIS — N18.30 STAGE 3 CHRONIC KIDNEY DISEASE, UNSPECIFIED WHETHER STAGE 3A OR 3B CKD: Chronic | ICD-10-CM

## 2022-05-16 DIAGNOSIS — E11.42 TYPE 2 DIABETES MELLITUS WITH DIABETIC POLYNEUROPATHY, WITHOUT LONG-TERM CURRENT USE OF INSULIN: ICD-10-CM

## 2022-05-16 LAB
ALBUMIN SERPL BCP-MCNC: 3.9 G/DL (ref 3.5–5.2)
ALP SERPL-CCNC: 56 U/L (ref 55–135)
ALT SERPL W/O P-5'-P-CCNC: 13 U/L (ref 10–44)
ANION GAP SERPL CALC-SCNC: 10 MMOL/L (ref 8–16)
AST SERPL-CCNC: 14 U/L (ref 10–40)
BILIRUB SERPL-MCNC: 0.6 MG/DL (ref 0.1–1)
BUN SERPL-MCNC: 22 MG/DL (ref 8–23)
CALCIUM SERPL-MCNC: 9.3 MG/DL (ref 8.7–10.5)
CHLORIDE SERPL-SCNC: 106 MMOL/L (ref 95–110)
CO2 SERPL-SCNC: 24 MMOL/L (ref 23–29)
CREAT SERPL-MCNC: 1.3 MG/DL (ref 0.5–1.4)
EST. GFR  (AFRICAN AMERICAN): 58.3 ML/MIN/1.73 M^2
EST. GFR  (NON AFRICAN AMERICAN): 50.5 ML/MIN/1.73 M^2
ESTIMATED AVG GLUCOSE: 137 MG/DL (ref 68–131)
GLUCOSE SERPL-MCNC: 130 MG/DL (ref 70–110)
HBA1C MFR BLD: 6.4 % (ref 4–5.6)
POTASSIUM SERPL-SCNC: 3.9 MMOL/L (ref 3.5–5.1)
PROT SERPL-MCNC: 7.1 G/DL (ref 6–8.4)
SODIUM SERPL-SCNC: 140 MMOL/L (ref 136–145)

## 2022-05-16 PROCEDURE — 36415 COLL VENOUS BLD VENIPUNCTURE: CPT | Mod: PN | Performed by: FAMILY MEDICINE

## 2022-05-16 PROCEDURE — 80053 COMPREHEN METABOLIC PANEL: CPT | Performed by: FAMILY MEDICINE

## 2022-05-16 PROCEDURE — 83036 HEMOGLOBIN GLYCOSYLATED A1C: CPT | Performed by: FAMILY MEDICINE

## 2022-05-23 ENCOUNTER — OFFICE VISIT (OUTPATIENT)
Dept: PRIMARY CARE CLINIC | Facility: CLINIC | Age: 82
End: 2022-05-23
Payer: MEDICARE

## 2022-05-23 VITALS
HEART RATE: 80 BPM | WEIGHT: 157.31 LBS | DIASTOLIC BLOOD PRESSURE: 76 MMHG | RESPIRATION RATE: 18 BRPM | BODY MASS INDEX: 23.84 KG/M2 | OXYGEN SATURATION: 99 % | SYSTOLIC BLOOD PRESSURE: 116 MMHG | HEIGHT: 68 IN

## 2022-05-23 DIAGNOSIS — G31.84 MILD COGNITIVE IMPAIRMENT: ICD-10-CM

## 2022-05-23 DIAGNOSIS — R26.89 BALANCE PROBLEM: ICD-10-CM

## 2022-05-23 DIAGNOSIS — E11.42 DIABETIC POLYNEUROPATHY ASSOCIATED WITH TYPE 2 DIABETES MELLITUS: Primary | Chronic | ICD-10-CM

## 2022-05-23 DIAGNOSIS — N18.30 STAGE 3 CHRONIC KIDNEY DISEASE, UNSPECIFIED WHETHER STAGE 3A OR 3B CKD: Chronic | ICD-10-CM

## 2022-05-23 DIAGNOSIS — E11.42 TYPE 2 DIABETES MELLITUS WITH DIABETIC POLYNEUROPATHY, WITHOUT LONG-TERM CURRENT USE OF INSULIN: Chronic | ICD-10-CM

## 2022-05-23 DIAGNOSIS — R53.82 CHRONIC FATIGUE: ICD-10-CM

## 2022-05-23 DIAGNOSIS — E11.9 TYPE 2 DIABETES MELLITUS WITHOUT COMPLICATION, WITHOUT LONG-TERM CURRENT USE OF INSULIN: ICD-10-CM

## 2022-05-23 DIAGNOSIS — E78.2 MIXED HYPERLIPIDEMIA: Chronic | ICD-10-CM

## 2022-05-23 PROCEDURE — 99214 PR OFFICE/OUTPT VISIT, EST, LEVL IV, 30-39 MIN: ICD-10-PCS | Mod: S$GLB,,, | Performed by: FAMILY MEDICINE

## 2022-05-23 PROCEDURE — 99214 OFFICE O/P EST MOD 30 MIN: CPT | Mod: S$GLB,,, | Performed by: FAMILY MEDICINE

## 2022-05-23 PROCEDURE — 99999 PR PBB SHADOW E&M-EST. PATIENT-LVL V: CPT | Mod: PBBFAC,,, | Performed by: FAMILY MEDICINE

## 2022-05-23 PROCEDURE — 99999 PR PBB SHADOW E&M-EST. PATIENT-LVL V: ICD-10-PCS | Mod: PBBFAC,,, | Performed by: FAMILY MEDICINE

## 2022-05-23 RX ORDER — INSULIN PUMP SYRINGE, 3 ML
EACH MISCELLANEOUS
Qty: 1 EACH | Status: SHIPPED | OUTPATIENT
Start: 2022-05-23

## 2022-05-23 RX ORDER — GLIMEPIRIDE 2 MG/1
2 TABLET ORAL
Start: 2022-05-23 | End: 2022-09-19

## 2022-05-23 NOTE — PROGRESS NOTES
THIS DOCUMENT WAS MADE IN PART WITH VOICE RECOGNITION SOFTWARE.  OCCASIONALLY THIS SOFTWARE WILL MISINTERPRET WORDS OR PHRASES.      Primary Care Provider Appointment - [facility],   [facility] ([facility])      Patient ID: Cecilio Cespedes is a 82 y.o. male.    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  A1c down to 6.4%.  Diabetic medications:  1. Glimepiride 2 mg, two daily.    He does not report any symptomatic hypoglycemia, but not checking glucose either.    Will reduce dose of glimepiride 2 mg, to one tablet with 1st meal of the day           Relevant Medications    glimepiride (AMARYL) 2 MG tablet    Other Relevant Orders    Hemoglobin A1C    Comprehensive Metabolic Panel    TSH    Stage 3 chronic kidney disease (Chronic)     Stable, no NSAID usage.  No uremic symptoms or voiding changes.           Relevant Orders    CBC Auto Differential    Mixed hyperlipidemia (Chronic)     Stable on pravastatin           Balance problem     Persistent, no falls.  But remains very sedentary.           Mild cognitive impairment    Relevant Orders    Ambulatory referral/consult to Adult Neuropsychology    Chronic fatigue     His wife reports excessive fatigue.  He will go to better on eight or nine, wake up a two with the afternoon, eat one meal, be wait for few hours, then go back to bed unless she forces him to get up.  He will wake occasionally to urinate.  This has not improved with treatment of possible depression with the Lexapro.  No worse on the Lexapro.  I do not identify any other obvious medications that could be sedating.  Claritin was still listed but he is not taking this,  will discontinue.  I do recommend regular glucose monitoring at home.  And request consultation with neuropsychiatry for further evaluation.  I also recommended a vitamin-D supplement, 2000 IU daily.  And consider B vitamin complex           Relevant Orders    Ambulatory referral/consult to Adult Neuropsychology    TSH      Other Visit Diagnoses     Type 2 diabetes mellitus without complication, without long-term current use of insulin        Relevant Medications    glimepiride (AMARYL) 2 MG tablet          Follow Up:  3-4 months    Health Maintenance       Date Due Completion Date    Shingles Vaccine (2 of 3) 09/09/2016 7/15/2016    Foot Exam 03/02/2022 3/2/2021    Eye Exam 06/08/2022 6/8/2021    Override on 11/17/2016: Done ([facility])    Override on 11/3/2015: Done    Hemoglobin A1c 11/16/2022 5/16/2022    Lipid Panel 01/18/2023 1/18/2022    Diabetes Urine Screening 01/24/2023 1/24/2022    TETANUS VACCINE 06/12/2024 6/12/2014 (Done)    Override on 6/12/2014: Done (Completed at [facility] ER for cut - dsn't remember specific date)              Subjective:     Chief Complaint   Patient presents with    Chronic Kidney Disease     4 month f/u visit      I have reviewed the information entered by the ancillary staff regarding the chief complaint as well as the related history.    HPI    Patient is a/an 82 y.o.  male   RISK of ADMIT/ED: 4    He is here with his wife.  No acute changes but persistent severe fatigue.  See above for all details discussed today    For complete problem list, past medical history, surgical history, social history, etc., see appropriate section in the electronic medical record    Review of Systems   Constitutional: Positive for fatigue.   Respiratory: Negative.    Cardiovascular: Negative.    Gastrointestinal: Negative.    Genitourinary: Negative.    Musculoskeletal: Negative.        Objective     Physical Exam  Constitutional:       General: He is not in acute distress.      "Appearance: He is well-developed. He is not diaphoretic.   HENT:      Head: Normocephalic and atraumatic.   Eyes:      General: No scleral icterus.     Conjunctiva/sclera: Conjunctivae normal.   Cardiovascular:      Rate and Rhythm: Normal rate and regular rhythm.      Comments: Pulses are normal.  Nail beds are pink with good capillary refill  Pulmonary:      Effort: Pulmonary effort is normal. No respiratory distress.   Neurological:      General: No focal deficit present.      Mental Status: He is alert.      Comments: He ambulates with a cane.  Although he did require a wheelchair for the long walk back   Psychiatric:         Behavior: Behavior normal.       Vitals:    05/23/22 1102   BP: 116/76   BP Location: Left arm   Patient Position: Sitting   BP Method: Medium (Manual)   Pulse: 80   Resp: 18   SpO2: 99%   Weight: 71.3 kg (157 lb 4.8 oz)   Height: 5' 8" (1.727 m)       RECENT LABS:    Lab Results   Component Value Date    WBC 8.13 01/14/2021    HGB 14.9 01/14/2021    HCT 46.5 01/14/2021     01/14/2021    CHOL 186 01/18/2022    TRIG 113 01/18/2022    HDL 35 (L) 01/18/2022    ALT 13 05/16/2022    AST 14 05/16/2022     05/16/2022    K 3.9 05/16/2022     05/16/2022    CREATININE 1.3 05/16/2022    BUN 22 05/16/2022    CO2 24 05/16/2022    TSH 3.170 01/18/2022    PSA 1.3 03/12/2019    HGBA1C 6.4 (H) 05/16/2022       Results for orders placed or performed in visit on 05/16/22   Hemoglobin A1C   Result Value Ref Range    Hemoglobin A1C 6.4 (H) 4.0 - 5.6 %    Estimated Avg Glucose 137 (H) 68 - 131 mg/dL   Comprehensive Metabolic Panel   Result Value Ref Range    Sodium 140 136 - 145 mmol/L    Potassium 3.9 3.5 - 5.1 mmol/L    Chloride 106 95 - 110 mmol/L    CO2 24 23 - 29 mmol/L    Glucose 130 (H) 70 - 110 mg/dL    BUN 22 8 - 23 mg/dL    Creatinine 1.3 0.5 - 1.4 mg/dL    Calcium 9.3 8.7 - 10.5 mg/dL    Total Protein 7.1 6.0 - 8.4 g/dL    Albumin 3.9 3.5 - 5.2 g/dL    Total Bilirubin 0.6 0.1 - 1.0 " mg/dL    Alkaline Phosphatase 56 55 - 135 U/L    AST 14 10 - 40 U/L    ALT 13 10 - 44 U/L    Anion Gap 10 8 - 16 mmol/L    eGFR if African American 58.3 (A) >60 mL/min/1.73 m^2    eGFR if non African American 50.5 (A) >60 mL/min/1.73 m^2

## 2022-05-23 NOTE — ASSESSMENT & PLAN NOTE
His wife reports excessive fatigue.  He will go to better on eight or nine, wake up a two with the afternoon, eat one meal, be wait for few hours, then go back to bed unless she forces him to get up.  He will wake occasionally to urinate.  This has not improved with treatment of possible depression with the Lexapro.  No worse on the Lexapro.  I do not identify any other obvious medications that could be sedating.  Claritin was still listed but he is not taking this, will discontinue.  I do recommend regular glucose monitoring at home.  And request consultation with neuropsychiatry for further evaluation.  I also recommended a vitamin-D supplement, 2000 IU daily.  And consider B vitamin complex

## 2022-05-23 NOTE — ASSESSMENT & PLAN NOTE
Diabetes has improved.  A1c down to 6.4%.  Diabetic medications:  1. Glimepiride 2 mg, two daily.    He does not report any symptomatic hypoglycemia, but not checking glucose either.    Will reduce dose of glimepiride 2 mg, to one tablet with 1st meal of the day

## 2022-06-03 ENCOUNTER — PES CALL (OUTPATIENT)
Dept: ADMINISTRATIVE | Facility: CLINIC | Age: 82
End: 2022-06-03
Payer: MEDICARE

## 2022-07-07 ENCOUNTER — PES CALL (OUTPATIENT)
Dept: ADMINISTRATIVE | Facility: CLINIC | Age: 82
End: 2022-07-07
Payer: MEDICARE

## 2022-07-08 ENCOUNTER — PES CALL (OUTPATIENT)
Dept: ADMINISTRATIVE | Facility: CLINIC | Age: 82
End: 2022-07-08
Payer: MEDICARE

## 2022-07-19 ENCOUNTER — PES CALL (OUTPATIENT)
Dept: ADMINISTRATIVE | Facility: CLINIC | Age: 82
End: 2022-07-19
Payer: MEDICARE

## 2022-07-20 ENCOUNTER — OFFICE VISIT (OUTPATIENT)
Dept: OPTOMETRY | Facility: CLINIC | Age: 82
End: 2022-07-20
Payer: MEDICARE

## 2022-07-20 DIAGNOSIS — E11.9 TYPE 2 DIABETES MELLITUS WITHOUT RETINOPATHY: Primary | ICD-10-CM

## 2022-07-20 DIAGNOSIS — H25.11 NUCLEAR SCLEROSIS OF RIGHT EYE: ICD-10-CM

## 2022-07-20 DIAGNOSIS — Z96.1 PSEUDOPHAKIA, LEFT EYE: ICD-10-CM

## 2022-07-20 PROCEDURE — 1101F PR PT FALLS ASSESS DOC 0-1 FALLS W/OUT INJ PAST YR: ICD-10-PCS | Mod: CPTII,S$GLB,, | Performed by: OPTOMETRIST

## 2022-07-20 PROCEDURE — 1159F PR MEDICATION LIST DOCUMENTED IN MEDICAL RECORD: ICD-10-PCS | Mod: CPTII,S$GLB,, | Performed by: OPTOMETRIST

## 2022-07-20 PROCEDURE — 1160F RVW MEDS BY RX/DR IN RCRD: CPT | Mod: CPTII,S$GLB,, | Performed by: OPTOMETRIST

## 2022-07-20 PROCEDURE — 1101F PT FALLS ASSESS-DOCD LE1/YR: CPT | Mod: CPTII,S$GLB,, | Performed by: OPTOMETRIST

## 2022-07-20 PROCEDURE — 1126F AMNT PAIN NOTED NONE PRSNT: CPT | Mod: CPTII,S$GLB,, | Performed by: OPTOMETRIST

## 2022-07-20 PROCEDURE — 92004 COMPRE OPH EXAM NEW PT 1/>: CPT | Mod: S$GLB,,, | Performed by: OPTOMETRIST

## 2022-07-20 PROCEDURE — 99999 PR PBB SHADOW E&M-EST. PATIENT-LVL III: ICD-10-PCS | Mod: PBBFAC,,, | Performed by: OPTOMETRIST

## 2022-07-20 PROCEDURE — 1126F PR PAIN SEVERITY QUANTIFIED, NO PAIN PRESENT: ICD-10-PCS | Mod: CPTII,S$GLB,, | Performed by: OPTOMETRIST

## 2022-07-20 PROCEDURE — 2023F PR DILATED RETINAL EXAM W/O EVID OF RETINOPATHY: ICD-10-PCS | Mod: CPTII,S$GLB,, | Performed by: OPTOMETRIST

## 2022-07-20 PROCEDURE — 92004 PR EYE EXAM, NEW PATIENT,COMPREHESV: ICD-10-PCS | Mod: S$GLB,,, | Performed by: OPTOMETRIST

## 2022-07-20 PROCEDURE — 99999 PR PBB SHADOW E&M-EST. PATIENT-LVL III: CPT | Mod: PBBFAC,,, | Performed by: OPTOMETRIST

## 2022-07-20 PROCEDURE — 1160F PR REVIEW ALL MEDS BY PRESCRIBER/CLIN PHARMACIST DOCUMENTED: ICD-10-PCS | Mod: CPTII,S$GLB,, | Performed by: OPTOMETRIST

## 2022-07-20 PROCEDURE — 2023F DILAT RTA XM W/O RTNOPTHY: CPT | Mod: CPTII,S$GLB,, | Performed by: OPTOMETRIST

## 2022-07-20 PROCEDURE — 3288F FALL RISK ASSESSMENT DOCD: CPT | Mod: CPTII,S$GLB,, | Performed by: OPTOMETRIST

## 2022-07-20 PROCEDURE — 1159F MED LIST DOCD IN RCRD: CPT | Mod: CPTII,S$GLB,, | Performed by: OPTOMETRIST

## 2022-07-20 PROCEDURE — 3288F PR FALLS RISK ASSESSMENT DOCUMENTED: ICD-10-PCS | Mod: CPTII,S$GLB,, | Performed by: OPTOMETRIST

## 2022-07-20 RX ORDER — MULTIVIT WITH MINERALS/HERBS
1 TABLET ORAL DAILY
COMMUNITY
End: 2023-11-15

## 2022-07-20 NOTE — PROGRESS NOTES
HPI     NP/Last eye exam was  +DM2  +Phaco OS    Pt. Denies blurry VA, flashes, floaters, pain, or use of gtts.    Hemoglobin A1C       Date                     Value               Ref Range             Status                05/16/2022               6.4 (H)             4.0 - 5.6 %           Final                 01/18/2022               6.8 (H)             4.0 - 5.6 %           Final                 09/14/2021               6.4 (H)             4.0 - 5.6 %           Final              Last edited by Valerie Owens on 7/20/2022  1:38 PM. (History)            Assessment /Plan     For exam results, see Encounter Report.    Type 2 diabetes mellitus without retinopathy    Nuclear sclerosis of right eye    Pseudophakia, left eye      1. No diabetic retinopathy, no csme. Return in 1 year for dilated eye exam.  2. Educated pt on presence of cataract and effects on vision. No surgery at this time. Recheck in one year.  3. Monitor condition. Patient to report any changes. RTC 1 year recheck.

## 2022-07-25 ENCOUNTER — TELEPHONE (OUTPATIENT)
Dept: PRIMARY CARE CLINIC | Facility: CLINIC | Age: 82
End: 2022-07-25

## 2022-07-25 NOTE — TELEPHONE ENCOUNTER
----- Message from Marycruz Troncoso sent at 7/25/2022  9:43 AM CDT -----  Regarding: Call  Patient Coy called in regards to Handicap papers they'd came to us about a week ago. The patient was requesting a call back to the number [phone] whenever possible.

## 2022-07-25 NOTE — TELEPHONE ENCOUNTER
Spoke to patient's wife and stated to her that the handicap form was filled out and placed in the mail. Verbalized understanding.

## 2022-08-17 ENCOUNTER — TELEPHONE (OUTPATIENT)
Dept: FAMILY MEDICINE | Facility: CLINIC | Age: 82
End: 2022-08-17
Payer: MEDICARE

## 2022-09-12 ENCOUNTER — LAB VISIT (OUTPATIENT)
Dept: LAB | Facility: HOSPITAL | Age: 82
End: 2022-09-12
Attending: FAMILY MEDICINE
Payer: MEDICARE

## 2022-09-12 DIAGNOSIS — E11.42 TYPE 2 DIABETES MELLITUS WITH DIABETIC POLYNEUROPATHY, WITHOUT LONG-TERM CURRENT USE OF INSULIN: Chronic | ICD-10-CM

## 2022-09-12 DIAGNOSIS — R53.82 CHRONIC FATIGUE: ICD-10-CM

## 2022-09-12 DIAGNOSIS — N18.30 STAGE 3 CHRONIC KIDNEY DISEASE, UNSPECIFIED WHETHER STAGE 3A OR 3B CKD: Chronic | ICD-10-CM

## 2022-09-12 LAB
ALBUMIN SERPL BCP-MCNC: 4 G/DL (ref 3.5–5.2)
ALP SERPL-CCNC: 54 U/L (ref 55–135)
ALT SERPL W/O P-5'-P-CCNC: 12 U/L (ref 10–44)
ANION GAP SERPL CALC-SCNC: 13 MMOL/L (ref 8–16)
AST SERPL-CCNC: 14 U/L (ref 10–40)
BASOPHILS # BLD AUTO: 0.04 K/UL (ref 0–0.2)
BASOPHILS NFR BLD: 0.6 % (ref 0–1.9)
BILIRUB SERPL-MCNC: 0.9 MG/DL (ref 0.1–1)
BUN SERPL-MCNC: 22 MG/DL (ref 8–23)
CALCIUM SERPL-MCNC: 9.5 MG/DL (ref 8.7–10.5)
CHLORIDE SERPL-SCNC: 106 MMOL/L (ref 95–110)
CO2 SERPL-SCNC: 23 MMOL/L (ref 23–29)
CREAT SERPL-MCNC: 1.3 MG/DL (ref 0.5–1.4)
DIFFERENTIAL METHOD: NORMAL
EOSINOPHIL # BLD AUTO: 0.2 K/UL (ref 0–0.5)
EOSINOPHIL NFR BLD: 2.2 % (ref 0–8)
ERYTHROCYTE [DISTWIDTH] IN BLOOD BY AUTOMATED COUNT: 13.2 % (ref 11.5–14.5)
EST. GFR  (NO RACE VARIABLE): 54.5 ML/MIN/1.73 M^2
ESTIMATED AVG GLUCOSE: 131 MG/DL (ref 68–131)
GLUCOSE SERPL-MCNC: 175 MG/DL (ref 70–110)
HBA1C MFR BLD: 6.2 % (ref 4–5.6)
HCT VFR BLD AUTO: 43 % (ref 40–54)
HGB BLD-MCNC: 14.2 G/DL (ref 14–18)
IMM GRANULOCYTES # BLD AUTO: 0.02 K/UL (ref 0–0.04)
IMM GRANULOCYTES NFR BLD AUTO: 0.3 % (ref 0–0.5)
LYMPHOCYTES # BLD AUTO: 1.7 K/UL (ref 1–4.8)
LYMPHOCYTES NFR BLD: 25.3 % (ref 18–48)
MCH RBC QN AUTO: 30.5 PG (ref 27–31)
MCHC RBC AUTO-ENTMCNC: 33 G/DL (ref 32–36)
MCV RBC AUTO: 92 FL (ref 82–98)
MONOCYTES # BLD AUTO: 0.5 K/UL (ref 0.3–1)
MONOCYTES NFR BLD: 6.8 % (ref 4–15)
NEUTROPHILS # BLD AUTO: 4.4 K/UL (ref 1.8–7.7)
NEUTROPHILS NFR BLD: 64.8 % (ref 38–73)
NRBC BLD-RTO: 0 /100 WBC
PLATELET # BLD AUTO: 258 K/UL (ref 150–450)
PMV BLD AUTO: 12.2 FL (ref 9.2–12.9)
POTASSIUM SERPL-SCNC: 3.7 MMOL/L (ref 3.5–5.1)
PROT SERPL-MCNC: 7 G/DL (ref 6–8.4)
RBC # BLD AUTO: 4.66 M/UL (ref 4.6–6.2)
SODIUM SERPL-SCNC: 142 MMOL/L (ref 136–145)
TSH SERPL DL<=0.005 MIU/L-ACNC: 2.81 UIU/ML (ref 0.4–4)
WBC # BLD AUTO: 6.77 K/UL (ref 3.9–12.7)

## 2022-09-12 PROCEDURE — 36415 COLL VENOUS BLD VENIPUNCTURE: CPT | Mod: PN | Performed by: FAMILY MEDICINE

## 2022-09-12 PROCEDURE — 85025 COMPLETE CBC W/AUTO DIFF WBC: CPT | Performed by: FAMILY MEDICINE

## 2022-09-12 PROCEDURE — 80053 COMPREHEN METABOLIC PANEL: CPT | Performed by: FAMILY MEDICINE

## 2022-09-12 PROCEDURE — 84443 ASSAY THYROID STIM HORMONE: CPT | Performed by: FAMILY MEDICINE

## 2022-09-12 PROCEDURE — 83036 HEMOGLOBIN GLYCOSYLATED A1C: CPT | Performed by: FAMILY MEDICINE

## 2022-09-19 ENCOUNTER — OFFICE VISIT (OUTPATIENT)
Dept: PRIMARY CARE CLINIC | Facility: CLINIC | Age: 82
End: 2022-09-19
Payer: MEDICARE

## 2022-09-19 VITALS
SYSTOLIC BLOOD PRESSURE: 120 MMHG | HEART RATE: 78 BPM | HEIGHT: 68 IN | WEIGHT: 156.5 LBS | BODY MASS INDEX: 23.72 KG/M2 | DIASTOLIC BLOOD PRESSURE: 68 MMHG | OXYGEN SATURATION: 99 %

## 2022-09-19 DIAGNOSIS — H61.23 BILATERAL IMPACTED CERUMEN: ICD-10-CM

## 2022-09-19 DIAGNOSIS — E11.42 DIABETIC POLYNEUROPATHY ASSOCIATED WITH TYPE 2 DIABETES MELLITUS: Primary | Chronic | ICD-10-CM

## 2022-09-19 DIAGNOSIS — E78.2 MIXED HYPERLIPIDEMIA: Chronic | ICD-10-CM

## 2022-09-19 DIAGNOSIS — N18.30 STAGE 3 CHRONIC KIDNEY DISEASE, UNSPECIFIED WHETHER STAGE 3A OR 3B CKD: Chronic | ICD-10-CM

## 2022-09-19 DIAGNOSIS — E11.42 TYPE 2 DIABETES MELLITUS WITH DIABETIC POLYNEUROPATHY, WITHOUT LONG-TERM CURRENT USE OF INSULIN: Chronic | ICD-10-CM

## 2022-09-19 DIAGNOSIS — I73.9 PVD (PERIPHERAL VASCULAR DISEASE): Chronic | ICD-10-CM

## 2022-09-19 DIAGNOSIS — R42 DIZZINESS: ICD-10-CM

## 2022-09-19 DIAGNOSIS — R26.89 BALANCE PROBLEM: ICD-10-CM

## 2022-09-19 PROCEDURE — 1159F MED LIST DOCD IN RCRD: CPT | Mod: CPTII,S$GLB,, | Performed by: FAMILY MEDICINE

## 2022-09-19 PROCEDURE — 3078F PR MOST RECENT DIASTOLIC BLOOD PRESSURE < 80 MM HG: ICD-10-PCS | Mod: CPTII,S$GLB,, | Performed by: FAMILY MEDICINE

## 2022-09-19 PROCEDURE — 90694 VACC AIIV4 NO PRSRV 0.5ML IM: CPT | Mod: S$GLB,,, | Performed by: FAMILY MEDICINE

## 2022-09-19 PROCEDURE — 90694 FLU VACCINE - QUADRIVALENT - ADJUVANTED: ICD-10-PCS | Mod: S$GLB,,, | Performed by: FAMILY MEDICINE

## 2022-09-19 PROCEDURE — 3288F FALL RISK ASSESSMENT DOCD: CPT | Mod: CPTII,S$GLB,, | Performed by: FAMILY MEDICINE

## 2022-09-19 PROCEDURE — 99999 PR PBB SHADOW E&M-EST. PATIENT-LVL IV: CPT | Mod: PBBFAC,,, | Performed by: FAMILY MEDICINE

## 2022-09-19 PROCEDURE — 1159F PR MEDICATION LIST DOCUMENTED IN MEDICAL RECORD: ICD-10-PCS | Mod: CPTII,S$GLB,, | Performed by: FAMILY MEDICINE

## 2022-09-19 PROCEDURE — 3078F DIAST BP <80 MM HG: CPT | Mod: CPTII,S$GLB,, | Performed by: FAMILY MEDICINE

## 2022-09-19 PROCEDURE — 1160F RVW MEDS BY RX/DR IN RCRD: CPT | Mod: CPTII,S$GLB,, | Performed by: FAMILY MEDICINE

## 2022-09-19 PROCEDURE — 3074F SYST BP LT 130 MM HG: CPT | Mod: CPTII,S$GLB,, | Performed by: FAMILY MEDICINE

## 2022-09-19 PROCEDURE — 99214 PR OFFICE/OUTPT VISIT, EST, LEVL IV, 30-39 MIN: ICD-10-PCS | Mod: 25,S$GLB,, | Performed by: FAMILY MEDICINE

## 2022-09-19 PROCEDURE — 3288F PR FALLS RISK ASSESSMENT DOCUMENTED: ICD-10-PCS | Mod: CPTII,S$GLB,, | Performed by: FAMILY MEDICINE

## 2022-09-19 PROCEDURE — 99214 OFFICE O/P EST MOD 30 MIN: CPT | Mod: 25,S$GLB,, | Performed by: FAMILY MEDICINE

## 2022-09-19 PROCEDURE — 1101F PR PT FALLS ASSESS DOC 0-1 FALLS W/OUT INJ PAST YR: ICD-10-PCS | Mod: CPTII,S$GLB,, | Performed by: FAMILY MEDICINE

## 2022-09-19 PROCEDURE — 1160F PR REVIEW ALL MEDS BY PRESCRIBER/CLIN PHARMACIST DOCUMENTED: ICD-10-PCS | Mod: CPTII,S$GLB,, | Performed by: FAMILY MEDICINE

## 2022-09-19 PROCEDURE — G0008 FLU VACCINE - QUADRIVALENT - ADJUVANTED: ICD-10-PCS | Mod: S$GLB,,, | Performed by: FAMILY MEDICINE

## 2022-09-19 PROCEDURE — 1126F AMNT PAIN NOTED NONE PRSNT: CPT | Mod: CPTII,S$GLB,, | Performed by: FAMILY MEDICINE

## 2022-09-19 PROCEDURE — 99999 PR PBB SHADOW E&M-EST. PATIENT-LVL IV: ICD-10-PCS | Mod: PBBFAC,,, | Performed by: FAMILY MEDICINE

## 2022-09-19 PROCEDURE — 1101F PT FALLS ASSESS-DOCD LE1/YR: CPT | Mod: CPTII,S$GLB,, | Performed by: FAMILY MEDICINE

## 2022-09-19 PROCEDURE — 3074F PR MOST RECENT SYSTOLIC BLOOD PRESSURE < 130 MM HG: ICD-10-PCS | Mod: CPTII,S$GLB,, | Performed by: FAMILY MEDICINE

## 2022-09-19 PROCEDURE — G0008 ADMIN INFLUENZA VIRUS VAC: HCPCS | Mod: S$GLB,,, | Performed by: FAMILY MEDICINE

## 2022-09-19 PROCEDURE — 1126F PR PAIN SEVERITY QUANTIFIED, NO PAIN PRESENT: ICD-10-PCS | Mod: CPTII,S$GLB,, | Performed by: FAMILY MEDICINE

## 2022-09-19 NOTE — ASSESSMENT & PLAN NOTE
Remains stable and under excellent control.  Will discontinue glimepiride completely.  Continue healthy diet and continue to monitor.    Current diabetic medications:  Glimepiride 2 mg daily (recently reduced from 4 mg daily).  , will discontinue glimepiride completely today    Still no hypoglycemia.    Diabetic health maintenance:  Foot exam today, other topics were up-to-date.      Follow back in about three months months

## 2022-09-19 NOTE — ASSESSMENT & PLAN NOTE
Encourage regular light exercise.  Offered PT again.  He declines because he felt it was not helping.  I advised him it could be helping to reduce the rate of decline and he will let me know if he reconsiders.

## 2022-09-19 NOTE — PROGRESS NOTES
THIS DOCUMENT WAS MADE IN PART WITH VOICE RECOGNITION SOFTWARE.  OCCASIONALLY THIS SOFTWARE WILL MISINTERPRET WORDS OR PHRASES.      Primary Care Provider Appointment   [facility] ([facility])  [address], [place] [address]   Ph: [phone]  Fax: [phone]      Patient ID: Cecilio Cespedes is a 82 y.o. male.    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  Discontinue glimepiride completely.  Current diabetic medications:  Glimepiride 2 mg daily (recently reduced from 4 mg daily).  Will discontinue glimepiride completely at this point.  Continue to monitor and continue healthy diet.    Still no hypoglycemia.    Diabetic health maintenance:  Foot exam today, other topics were up-to-date.      Follow back in about three months         Stage 3 chronic kidney disease (Chronic)     Stable, no nephrotoxic drugs.  No uremic symptoms.  Age-appropriate.  Continue to monitor.         Relevant Orders    Hemoglobin A1C    Comprehensive Metabolic Panel    Lipid Panel    TSH    Mixed hyperlipidemia (Chronic)     Stable continue pravastatin 20 mg daily         Relevant Orders    Hemoglobin A1C    Comprehensive Metabolic Panel    Lipid Panel    TSH    PVD (peripheral vascular disease) (Chronic)     Stable no significant symptoms, no ulcerations, no cyanosis.         Balance problem     Encourage regular light exercise.  Offered PT again.  He declines because he felt it was not helping.  I advised him it could be helping to reduce the rate of decline and he will let me know if he  reconsiders.          Other Visit Diagnoses       Dizziness        Bilateral impacted cerumen       He will follow with his ENT physician.  Discussed precautions about reducing risk of falls.  His dizziness may be mixed combination of mild lightheadedness as well as possible vertigo.  He will follow with his ENT doctor and let me know if anything changes.  No focal neurological deficits noted on exam today just his chronic generalized weakness.         Follow Up:  3 months      Health Maintenance         Date Due Completion Date    Shingles Vaccine (2 of 3) 09/09/2016 7/15/2016    Foot Exam 03/02/2022 3/2/2021    Influenza Vaccine (1) 09/01/2022 10/18/2021    Override on 10/29/2019: Done    Lipid Panel 01/18/2023 1/18/2022    Diabetes Urine Screening 01/24/2023 1/24/2022    Hemoglobin A1c 03/12/2023 9/12/2022    Eye Exam 07/20/2023 7/20/2022    Override on 11/17/2016: Done ([facility])    Override on 11/3/2015: Done    TETANUS VACCINE 06/12/2024 6/12/2014 (Done)    Override on 6/12/2014: Done (Completed at [facility] ER for cut - dsn't remember specific date)            Subjective:     Chief Complaint   Patient presents with    Diabetes     Follow up     I have reviewed the information entered by the ancillary staff regarding the chief complaint as well as the related history.    HPI    Patient is a/an 82 y.o.  male   RISK of ADMIT/ED: 4    Dizziness in am, some days, usually in am, before getting up, not when 1st    For complete problem list, past medical history, surgical history, social history, etc., see appropriate section in the electronic medical record    Review of Systems   HENT: Negative.     Respiratory: Negative.     Cardiovascular: Negative.    Gastrointestinal: Negative.    Genitourinary: Negative.    Neurological:  Positive for dizziness and weakness.     Objective     Physical Exam  Constitutional:       General: He is not in acute distress.     Appearance: He is well-developed. He is not  "diaphoretic.   HENT:      Head: Normocephalic and atraumatic.   Eyes:      General: No scleral icterus.     Conjunctiva/sclera: Conjunctivae normal.   Cardiovascular:      Rate and Rhythm: Normal rate and regular rhythm.      Pulses:           Dorsalis pedis pulses are 1+ on the right side and 1+ on the left side.        Posterior tibial pulses are 1+ on the right side and 1+ on the left side.      Heart sounds: Murmur heard.      Comments: Pulses are normal.  Nail beds are pink with good capillary refill  Pulmonary:      Effort: Pulmonary effort is normal. No respiratory distress.      Breath sounds: No wheezing or rales.   Feet:      Right foot:      Protective Sensation: 6 sites tested.  6 sites sensed.      Skin integrity: Dry skin present. No ulcer or skin breakdown.      Toenail Condition: Right toenails are abnormally thick.      Left foot:      Protective Sensation: 6 sites tested.  5 sites sensed.      Skin integrity: Dry skin present. No ulcer or skin breakdown.      Toenail Condition: Left toenails are abnormally thick.   Neurological:      General: No focal deficit present.      Mental Status: He is alert. Mental status is at baseline.      Cranial Nerves: No cranial nerve deficit.      Comments: He ambulates with a cane.  Although once again he did require a wheelchair for the long walk back   Psychiatric:         Behavior: Behavior normal.     Vitals:    09/19/22 1021   BP: 120/68   BP Location: Right arm   Patient Position: Sitting   BP Method: Medium (Manual)   Pulse: 78   SpO2: 99%   Weight: 71 kg (156 lb 8.4 oz)   Height: 5' 8" (1.727 m)       RECENT LABS:    Lab Results   Component Value Date    WBC 6.77 09/12/2022    HGB 14.2 09/12/2022    HCT 43.0 09/12/2022     09/12/2022    CHOL 186 01/18/2022    TRIG 113 01/18/2022    HDL 35 (L) 01/18/2022    ALT 12 09/12/2022    AST 14 09/12/2022     09/12/2022    K 3.7 09/12/2022     09/12/2022    CREATININE 1.3 09/12/2022    BUN 22 " 09/12/2022    CO2 23 09/12/2022    TSH 2.811 09/12/2022    PSA 1.3 03/12/2019    HGBA1C 6.2 (H) 09/12/2022       Results for orders placed or performed in visit on 09/12/22   Hemoglobin A1C   Result Value Ref Range    Hemoglobin A1C 6.2 (H) 4.0 - 5.6 %    Estimated Avg Glucose 131 68 - 131 mg/dL   Comprehensive Metabolic Panel   Result Value Ref Range    Sodium 142 136 - 145 mmol/L    Potassium 3.7 3.5 - 5.1 mmol/L    Chloride 106 95 - 110 mmol/L    CO2 23 23 - 29 mmol/L    Glucose 175 (H) 70 - 110 mg/dL    BUN 22 8 - 23 mg/dL    Creatinine 1.3 0.5 - 1.4 mg/dL    Calcium 9.5 8.7 - 10.5 mg/dL    Total Protein 7.0 6.0 - 8.4 g/dL    Albumin 4.0 3.5 - 5.2 g/dL    Total Bilirubin 0.9 0.1 - 1.0 mg/dL    Alkaline Phosphatase 54 (L) 55 - 135 U/L    AST 14 10 - 40 U/L    ALT 12 10 - 44 U/L    Anion Gap 13 8 - 16 mmol/L    eGFR 54.5 (A) >60 mL/min/1.73 m^2   TSH   Result Value Ref Range    TSH 2.811 0.400 - 4.000 uIU/mL   CBC Auto Differential   Result Value Ref Range    WBC 6.77 3.90 - 12.70 K/uL    RBC 4.66 4.60 - 6.20 M/uL    Hemoglobin 14.2 14.0 - 18.0 g/dL    Hematocrit 43.0 40.0 - 54.0 %    MCV 92 82 - 98 fL    MCH 30.5 27.0 - 31.0 pg    MCHC 33.0 32.0 - 36.0 g/dL    RDW 13.2 11.5 - 14.5 %    Platelets 258 150 - 450 K/uL    MPV 12.2 9.2 - 12.9 fL    Immature Granulocytes 0.3 0.0 - 0.5 %    Gran # (ANC) 4.4 1.8 - 7.7 K/uL    Immature Grans (Abs) 0.02 0.00 - 0.04 K/uL    Lymph # 1.7 1.0 - 4.8 K/uL    Mono # 0.5 0.3 - 1.0 K/uL    Eos # 0.2 0.0 - 0.5 K/uL    Baso # 0.04 0.00 - 0.20 K/uL    nRBC 0 0 /100 WBC    Gran % 64.8 38.0 - 73.0 %    Lymph % 25.3 18.0 - 48.0 %    Mono % 6.8 4.0 - 15.0 %    Eosinophil % 2.2 0.0 - 8.0 %    Basophil % 0.6 0.0 - 1.9 %    Differential Method Automated

## 2022-10-13 ENCOUNTER — OFFICE VISIT (OUTPATIENT)
Dept: OTOLARYNGOLOGY | Facility: CLINIC | Age: 82
End: 2022-10-13
Payer: MEDICARE

## 2022-10-13 VITALS — BODY MASS INDEX: 23.72 KG/M2 | HEIGHT: 68 IN | TEMPERATURE: 99 F | WEIGHT: 156.5 LBS

## 2022-10-13 DIAGNOSIS — H61.23 IMPACTED CERUMEN, BILATERAL: ICD-10-CM

## 2022-10-13 DIAGNOSIS — J31.0 CHRONIC RHINITIS: Primary | ICD-10-CM

## 2022-10-13 PROCEDURE — 1126F PR PAIN SEVERITY QUANTIFIED, NO PAIN PRESENT: ICD-10-PCS | Mod: CPTII,S$GLB,, | Performed by: NURSE PRACTITIONER

## 2022-10-13 PROCEDURE — 99999 PR PBB SHADOW E&M-EST. PATIENT-LVL III: ICD-10-PCS | Mod: PBBFAC,,, | Performed by: NURSE PRACTITIONER

## 2022-10-13 PROCEDURE — 99203 PR OFFICE/OUTPT VISIT, NEW, LEVL III, 30-44 MIN: ICD-10-PCS | Mod: 25,S$GLB,, | Performed by: NURSE PRACTITIONER

## 2022-10-13 PROCEDURE — 1126F AMNT PAIN NOTED NONE PRSNT: CPT | Mod: CPTII,S$GLB,, | Performed by: NURSE PRACTITIONER

## 2022-10-13 PROCEDURE — 1160F PR REVIEW ALL MEDS BY PRESCRIBER/CLIN PHARMACIST DOCUMENTED: ICD-10-PCS | Mod: CPTII,S$GLB,, | Performed by: NURSE PRACTITIONER

## 2022-10-13 PROCEDURE — 1160F RVW MEDS BY RX/DR IN RCRD: CPT | Mod: CPTII,S$GLB,, | Performed by: NURSE PRACTITIONER

## 2022-10-13 PROCEDURE — 99203 OFFICE O/P NEW LOW 30 MIN: CPT | Mod: 25,S$GLB,, | Performed by: NURSE PRACTITIONER

## 2022-10-13 PROCEDURE — 1101F PT FALLS ASSESS-DOCD LE1/YR: CPT | Mod: CPTII,S$GLB,, | Performed by: NURSE PRACTITIONER

## 2022-10-13 PROCEDURE — 69210 PR REMOVAL IMPACTED CERUMEN REQUIRING INSTRUMENTATION, UNILATERAL: ICD-10-PCS | Mod: S$GLB,,, | Performed by: NURSE PRACTITIONER

## 2022-10-13 PROCEDURE — 3288F PR FALLS RISK ASSESSMENT DOCUMENTED: ICD-10-PCS | Mod: CPTII,S$GLB,, | Performed by: NURSE PRACTITIONER

## 2022-10-13 PROCEDURE — 1159F PR MEDICATION LIST DOCUMENTED IN MEDICAL RECORD: ICD-10-PCS | Mod: CPTII,S$GLB,, | Performed by: NURSE PRACTITIONER

## 2022-10-13 PROCEDURE — 3288F FALL RISK ASSESSMENT DOCD: CPT | Mod: CPTII,S$GLB,, | Performed by: NURSE PRACTITIONER

## 2022-10-13 PROCEDURE — 1101F PR PT FALLS ASSESS DOC 0-1 FALLS W/OUT INJ PAST YR: ICD-10-PCS | Mod: CPTII,S$GLB,, | Performed by: NURSE PRACTITIONER

## 2022-10-13 PROCEDURE — 99999 PR PBB SHADOW E&M-EST. PATIENT-LVL III: CPT | Mod: PBBFAC,,, | Performed by: NURSE PRACTITIONER

## 2022-10-13 PROCEDURE — 69210 REMOVE IMPACTED EAR WAX UNI: CPT | Mod: S$GLB,,, | Performed by: NURSE PRACTITIONER

## 2022-10-13 PROCEDURE — 1159F MED LIST DOCD IN RCRD: CPT | Mod: CPTII,S$GLB,, | Performed by: NURSE PRACTITIONER

## 2022-10-13 RX ORDER — IPRATROPIUM BROMIDE 42 UG/1
2 SPRAY, METERED NASAL 3 TIMES DAILY
Qty: 15 ML | Refills: 12 | Status: SHIPPED | OUTPATIENT
Start: 2022-10-13 | End: 2023-10-13

## 2022-10-13 NOTE — PROGRESS NOTES
Subjective:       Patient ID: Cecilio Cespedes is a 82 y.o. male.    Chief Complaint: Cerumen Impaction, Sinus Problem, and sneezes a lot    Sinus Problem  Patient is new to ENT, referred by Dr. West for sneezing, rhinorrhea, and cerumen impaction. No allergist or allergy testing. Takes nothing for allergies. No nasal sprays. No blood thinners.     Review of Systems   Constitutional: Negative.    Eyes: Negative.    Cardiovascular: Negative.    Gastrointestinal:  Positive for constipation.   Endocrine: Negative.    Genitourinary: Negative.    Musculoskeletal: Negative.    Integumentary:  Negative.   Allergic/Immunologic: Negative.    Neurological:  Positive for dizziness.   Hematological: Negative.    Psychiatric/Behavioral: Negative.         Objective:      Physical Exam  Vitals and nursing note reviewed.   Constitutional:       General: He is not in acute distress.     Appearance: He is well-developed. He is not ill-appearing.   HENT:      Head: Normocephalic and atraumatic.      Right Ear: Hearing, tympanic membrane, ear canal and external ear normal. No middle ear effusion. Tympanic membrane is not erythematous.      Left Ear: Hearing, tympanic membrane, ear canal and external ear normal.  No middle ear effusion. Tympanic membrane is not erythematous.      Nose: Rhinorrhea present. No mucosal edema.   Eyes:      General: Lids are normal. No scleral icterus.        Right eye: No discharge.         Left eye: No discharge.   Neck:      Trachea: Trachea normal. No tracheal deviation.   Cardiovascular:      Rate and Rhythm: Normal rate.   Pulmonary:      Effort: Pulmonary effort is normal. No respiratory distress.      Breath sounds: No stridor. No wheezing.   Musculoskeletal:         General: Normal range of motion.      Cervical back: Normal range of motion.   Skin:     General: Skin is warm and dry.   Neurological:      Mental Status: He is alert and oriented to person, place, and time.      Coordination:  Coordination is intact.      Gait: Gait normal.   Psychiatric:         Attention and Perception: Attention normal.         Mood and Affect: Mood normal.         Speech: Speech normal.         Behavior: Behavior normal. Behavior is cooperative.       SEPARATE PROCEDURE IN OFFICE:   Procedure: Removal of impacted cerumen, bilateral   Pre Procedure Diagnosis: Cerumen Impaction   Post Procedure Diagnosis: Cerumen Impaction   Verbal informed consent in regards to risk of trauma to ear canal, ear drum or hearing, discomfort during procedure and/or inability to remove cerumen impaction in one session or unforeseen events or complications.   No anesthesia.     Procedure in detail:   Ear canal visualized bilateral with appropriate size ear speculum utilizing Operating Head Binocular Otomicroscope   Utilizing the following:  Ring curet, delicate alligator forceps, and/or suction cannula was used. The impacted cerumen of the ear canals was removed atraumatically. The TM and EAC were then inspected and found to be clear of wax. See description of TMs/EACs in PE above.   Complications: No   Condition: Improved/Good    Assessment:       Problem List Items Addressed This Visit    None  Visit Diagnoses       Chronic rhinitis    -  Primary    Relevant Medications    ipratropium (ATROVENT) 42 mcg (0.06 %) nasal spray    Impacted cerumen, bilateral                Plan:     Atrovent nasal spray BID-TID    Patient encouraged to return to clinic if symptoms worsen/persist and as needed for further ENT symptoms or concerns.

## 2022-10-13 NOTE — PATIENT INSTRUCTIONS
"DIFFERENT TYPES OF "ENT-APPROVED" NASAL SPRAYS:  Flonase / fluticasone / Nasacort / Rhinocort (steroid spray) is best for stuffy, pressure, fullness.  Astelin / azelastine (antihistamine spray) is best for itchy, drippy, sneezy.  Atrovent / ipratropium is best for chronic watery nasal drip ("leaky faucet" nose), which may worsen with eating.    Use as directed, spraying 1-2 times in each nostril each day. It may take 2-3 days to 2-3 weeks to begin seeing improvement. This medication needs to be taken consistently to see results. Overall, this is a well-tolerated medication with low side effects. The benefit of nasal steroids as opposed to oral steroids is that the nasal steroid spray works primarily in the nose. Common side effects can include: headache, nasal dryness, minor nose bleed.  Rare side effects may include:  septal perforation, elevation in eye pressure, dry eyes, change in smell, allergic reaction.  Notify your provider if you have any concerns or experience these symptoms.     Nasal spray instructions:  Blow nose first gently to clean. Keep chin level with the floor (do not tilt head forward or back). Using the opposite hand (example: right hand for left nostril, left hand for right nostril) insert nasal spray taking caution to direct it AWAY from the middle wall inside the nose (septum) to avoid irritating nasal septum which could cause nosebleed.  Do not tilt spray up but rather flat and out along the roof of your mouth to spray. Angle the tip of the spray out slightly toward the direction of the ears; then spray. Do not take quick vigorous sniff but rather slow gentle inhalation while waiting for medication to absorb into nasal passages. Then administer second spray in same way.     Nasal saline rinse kit (use Neti pot or Advaction sinus rinse kit) -- Rinse your sinuses once to twice daily to reduce the allergen burden in your nose. Use sterile water (boiled tap water which has cooled) or distilled " bottled water. Add 1/4 teaspoon sea salt and a pinch of baking soda or a mixture packet from the maker of your sinus rinse kit.  Rinse through both sides of nose to cleanse sinus and nasal passages, bending forward with head tilted down. Keep your mouth open, without holding your breath. Squeeze bottle gently until solution starts draining from the opposite nasal passage. After bottle is empty, blow nose very gently, without pinching nose completely, to avoid pressure on eardrums.  There are useful YouTube videos that show demonstration of how to do these properly.     Ponaris Nasal Emollient is used for the relief of: nasal congestion due to colds, nasal irritation, allergy exacerbations, nasal crusting. Specifically prepared iodized organic oils of pine, eucalyptus, peppermint, cajeput, and cottonseed. To order Ponaris: ask your pharmacist to order it for you or we carry it in our pharmacy downstairs on the first floor.

## 2022-12-09 ENCOUNTER — IMMUNIZATION (OUTPATIENT)
Dept: FAMILY MEDICINE | Facility: CLINIC | Age: 82
End: 2022-12-09
Payer: MEDICARE

## 2022-12-09 DIAGNOSIS — Z23 NEED FOR VACCINATION: Primary | ICD-10-CM

## 2022-12-09 PROCEDURE — 91312 COVID-19, MRNA, LNP-S, BIVALENT BOOSTER, PF, 30 MCG/0.3 ML DOSE: ICD-10-PCS | Mod: S$GLB,,, | Performed by: FAMILY MEDICINE

## 2022-12-09 PROCEDURE — 91312 COVID-19, MRNA, LNP-S, BIVALENT BOOSTER, PF, 30 MCG/0.3 ML DOSE: CPT | Mod: S$GLB,,, | Performed by: FAMILY MEDICINE

## 2022-12-09 PROCEDURE — 0124A COVID-19, MRNA, LNP-S, BIVALENT BOOSTER, PF, 30 MCG/0.3 ML DOSE: CPT | Mod: PBBFAC | Performed by: FAMILY MEDICINE

## 2023-01-17 ENCOUNTER — LAB VISIT (OUTPATIENT)
Dept: LAB | Facility: HOSPITAL | Age: 83
End: 2023-01-17
Attending: FAMILY MEDICINE
Payer: MEDICARE

## 2023-01-17 DIAGNOSIS — N18.30 STAGE 3 CHRONIC KIDNEY DISEASE, UNSPECIFIED WHETHER STAGE 3A OR 3B CKD: Chronic | ICD-10-CM

## 2023-01-17 DIAGNOSIS — E78.2 MIXED HYPERLIPIDEMIA: Chronic | ICD-10-CM

## 2023-01-17 DIAGNOSIS — E11.42 DIABETIC POLYNEUROPATHY ASSOCIATED WITH TYPE 2 DIABETES MELLITUS: Chronic | ICD-10-CM

## 2023-01-17 LAB
ALBUMIN SERPL BCP-MCNC: 3.9 G/DL (ref 3.5–5.2)
ALP SERPL-CCNC: 55 U/L (ref 55–135)
ALT SERPL W/O P-5'-P-CCNC: 15 U/L (ref 10–44)
ANION GAP SERPL CALC-SCNC: 11 MMOL/L (ref 8–16)
AST SERPL-CCNC: 12 U/L (ref 10–40)
BILIRUB SERPL-MCNC: 0.7 MG/DL (ref 0.1–1)
BUN SERPL-MCNC: 19 MG/DL (ref 8–23)
CALCIUM SERPL-MCNC: 9.8 MG/DL (ref 8.7–10.5)
CHLORIDE SERPL-SCNC: 105 MMOL/L (ref 95–110)
CHOLEST SERPL-MCNC: 179 MG/DL (ref 120–199)
CHOLEST/HDLC SERPL: 4.6 {RATIO} (ref 2–5)
CO2 SERPL-SCNC: 24 MMOL/L (ref 23–29)
CREAT SERPL-MCNC: 1.4 MG/DL (ref 0.5–1.4)
EST. GFR  (NO RACE VARIABLE): 49.9 ML/MIN/1.73 M^2
ESTIMATED AVG GLUCOSE: 214 MG/DL (ref 68–131)
GLUCOSE SERPL-MCNC: 296 MG/DL (ref 70–110)
HBA1C MFR BLD: 9.1 % (ref 4–5.6)
HDLC SERPL-MCNC: 39 MG/DL (ref 40–75)
HDLC SERPL: 21.8 % (ref 20–50)
LDLC SERPL CALC-MCNC: 109 MG/DL (ref 63–159)
NONHDLC SERPL-MCNC: 140 MG/DL
POTASSIUM SERPL-SCNC: 4.1 MMOL/L (ref 3.5–5.1)
PROT SERPL-MCNC: 7.1 G/DL (ref 6–8.4)
SODIUM SERPL-SCNC: 140 MMOL/L (ref 136–145)
TRIGL SERPL-MCNC: 155 MG/DL (ref 30–150)
TSH SERPL DL<=0.005 MIU/L-ACNC: 2.3 UIU/ML (ref 0.4–4)

## 2023-01-17 PROCEDURE — 80053 COMPREHEN METABOLIC PANEL: CPT | Performed by: FAMILY MEDICINE

## 2023-01-17 PROCEDURE — 84443 ASSAY THYROID STIM HORMONE: CPT | Performed by: FAMILY MEDICINE

## 2023-01-17 PROCEDURE — 36415 COLL VENOUS BLD VENIPUNCTURE: CPT | Mod: PN | Performed by: FAMILY MEDICINE

## 2023-01-17 PROCEDURE — 80061 LIPID PANEL: CPT | Performed by: FAMILY MEDICINE

## 2023-01-17 PROCEDURE — 83036 HEMOGLOBIN GLYCOSYLATED A1C: CPT | Performed by: FAMILY MEDICINE

## 2023-01-23 ENCOUNTER — TELEPHONE (OUTPATIENT)
Dept: PRIMARY CARE CLINIC | Facility: CLINIC | Age: 83
End: 2023-01-23
Payer: MEDICARE

## 2023-01-23 ENCOUNTER — OFFICE VISIT (OUTPATIENT)
Dept: PRIMARY CARE CLINIC | Facility: CLINIC | Age: 83
End: 2023-01-23
Payer: MEDICARE

## 2023-01-23 VITALS
HEART RATE: 96 BPM | RESPIRATION RATE: 20 BRPM | DIASTOLIC BLOOD PRESSURE: 60 MMHG | SYSTOLIC BLOOD PRESSURE: 102 MMHG | HEIGHT: 68 IN | WEIGHT: 156.06 LBS | BODY MASS INDEX: 23.65 KG/M2 | OXYGEN SATURATION: 98 %

## 2023-01-23 DIAGNOSIS — I73.9 PVD (PERIPHERAL VASCULAR DISEASE): Chronic | ICD-10-CM

## 2023-01-23 DIAGNOSIS — F32.4 MAJOR DEPRESSIVE DISORDER IN PARTIAL REMISSION, UNSPECIFIED WHETHER RECURRENT: ICD-10-CM

## 2023-01-23 DIAGNOSIS — I70.0 AORTIC ATHEROSCLEROSIS: Chronic | ICD-10-CM

## 2023-01-23 DIAGNOSIS — E11.9 TYPE 2 DIABETES MELLITUS WITHOUT COMPLICATION, WITHOUT LONG-TERM CURRENT USE OF INSULIN: ICD-10-CM

## 2023-01-23 DIAGNOSIS — E78.2 MIXED HYPERLIPIDEMIA: Chronic | ICD-10-CM

## 2023-01-23 DIAGNOSIS — E11.42 DIABETIC POLYNEUROPATHY ASSOCIATED WITH TYPE 2 DIABETES MELLITUS: Primary | Chronic | ICD-10-CM

## 2023-01-23 DIAGNOSIS — E11.42 TYPE 2 DIABETES MELLITUS WITH DIABETIC POLYNEUROPATHY, WITHOUT LONG-TERM CURRENT USE OF INSULIN: Chronic | ICD-10-CM

## 2023-01-23 DIAGNOSIS — E78.5 DYSLIPIDEMIA: ICD-10-CM

## 2023-01-23 DIAGNOSIS — G31.84 MILD COGNITIVE IMPAIRMENT: ICD-10-CM

## 2023-01-23 DIAGNOSIS — R26.89 BALANCE PROBLEM: ICD-10-CM

## 2023-01-23 DIAGNOSIS — R53.1 DECREASED STRENGTH: ICD-10-CM

## 2023-01-23 DIAGNOSIS — N18.30 STAGE 3 CHRONIC KIDNEY DISEASE, UNSPECIFIED WHETHER STAGE 3A OR 3B CKD: Chronic | ICD-10-CM

## 2023-01-23 LAB — GLUCOSE SERPL-MCNC: 375 MG/DL (ref 70–110)

## 2023-01-23 PROCEDURE — 3078F DIAST BP <80 MM HG: CPT | Mod: CPTII,S$GLB,, | Performed by: FAMILY MEDICINE

## 2023-01-23 PROCEDURE — 1160F RVW MEDS BY RX/DR IN RCRD: CPT | Mod: CPTII,S$GLB,, | Performed by: FAMILY MEDICINE

## 2023-01-23 PROCEDURE — 3074F SYST BP LT 130 MM HG: CPT | Mod: CPTII,S$GLB,, | Performed by: FAMILY MEDICINE

## 2023-01-23 PROCEDURE — 1100F PTFALLS ASSESS-DOCD GE2>/YR: CPT | Mod: CPTII,S$GLB,, | Performed by: FAMILY MEDICINE

## 2023-01-23 PROCEDURE — 99999 PR PBB SHADOW E&M-EST. PATIENT-LVL IV: ICD-10-PCS | Mod: PBBFAC,,, | Performed by: FAMILY MEDICINE

## 2023-01-23 PROCEDURE — 3074F PR MOST RECENT SYSTOLIC BLOOD PRESSURE < 130 MM HG: ICD-10-PCS | Mod: CPTII,S$GLB,, | Performed by: FAMILY MEDICINE

## 2023-01-23 PROCEDURE — 99214 OFFICE O/P EST MOD 30 MIN: CPT | Mod: S$GLB,,, | Performed by: FAMILY MEDICINE

## 2023-01-23 PROCEDURE — 82962 GLUCOSE BLOOD TEST: CPT | Mod: S$GLB,,, | Performed by: FAMILY MEDICINE

## 2023-01-23 PROCEDURE — 1126F AMNT PAIN NOTED NONE PRSNT: CPT | Mod: CPTII,S$GLB,, | Performed by: FAMILY MEDICINE

## 2023-01-23 PROCEDURE — 1160F PR REVIEW ALL MEDS BY PRESCRIBER/CLIN PHARMACIST DOCUMENTED: ICD-10-PCS | Mod: CPTII,S$GLB,, | Performed by: FAMILY MEDICINE

## 2023-01-23 PROCEDURE — 3078F PR MOST RECENT DIASTOLIC BLOOD PRESSURE < 80 MM HG: ICD-10-PCS | Mod: CPTII,S$GLB,, | Performed by: FAMILY MEDICINE

## 2023-01-23 PROCEDURE — 3288F PR FALLS RISK ASSESSMENT DOCUMENTED: ICD-10-PCS | Mod: CPTII,S$GLB,, | Performed by: FAMILY MEDICINE

## 2023-01-23 PROCEDURE — 1159F PR MEDICATION LIST DOCUMENTED IN MEDICAL RECORD: ICD-10-PCS | Mod: CPTII,S$GLB,, | Performed by: FAMILY MEDICINE

## 2023-01-23 PROCEDURE — 99999 PR PBB SHADOW E&M-EST. PATIENT-LVL IV: CPT | Mod: PBBFAC,,, | Performed by: FAMILY MEDICINE

## 2023-01-23 PROCEDURE — 1159F MED LIST DOCD IN RCRD: CPT | Mod: CPTII,S$GLB,, | Performed by: FAMILY MEDICINE

## 2023-01-23 PROCEDURE — 1126F PR PAIN SEVERITY QUANTIFIED, NO PAIN PRESENT: ICD-10-PCS | Mod: CPTII,S$GLB,, | Performed by: FAMILY MEDICINE

## 2023-01-23 PROCEDURE — 82962 POCT GLUCOSE, HAND-HELD DEVICE: ICD-10-PCS | Mod: S$GLB,,, | Performed by: FAMILY MEDICINE

## 2023-01-23 PROCEDURE — 99214 PR OFFICE/OUTPT VISIT, EST, LEVL IV, 30-39 MIN: ICD-10-PCS | Mod: S$GLB,,, | Performed by: FAMILY MEDICINE

## 2023-01-23 PROCEDURE — 3072F LOW RISK FOR RETINOPATHY: CPT | Mod: CPTII,S$GLB,, | Performed by: FAMILY MEDICINE

## 2023-01-23 PROCEDURE — 3288F FALL RISK ASSESSMENT DOCD: CPT | Mod: CPTII,S$GLB,, | Performed by: FAMILY MEDICINE

## 2023-01-23 PROCEDURE — 1100F PR PT FALLS ASSESS DOC 2+ FALLS/FALL W/INJURY/YR: ICD-10-PCS | Mod: CPTII,S$GLB,, | Performed by: FAMILY MEDICINE

## 2023-01-23 PROCEDURE — 3072F PR LOW RISK FOR RETINOPATHY: ICD-10-PCS | Mod: CPTII,S$GLB,, | Performed by: FAMILY MEDICINE

## 2023-01-23 RX ORDER — GLIMEPIRIDE 1 MG/1
2 TABLET ORAL
COMMUNITY
End: 2023-01-23

## 2023-01-23 RX ORDER — PRAVASTATIN SODIUM 20 MG/1
20 TABLET ORAL DAILY
Qty: 90 TABLET | Refills: 3 | Status: SHIPPED | OUTPATIENT
Start: 2023-01-23 | End: 2024-02-06 | Stop reason: SDUPTHER

## 2023-01-23 RX ORDER — GLIMEPIRIDE 2 MG/1
2 TABLET ORAL
Qty: 90 TABLET | Refills: 0 | Status: SHIPPED | OUTPATIENT
Start: 2023-01-23 | End: 2023-06-05 | Stop reason: SDUPTHER

## 2023-01-23 RX ORDER — GLIMEPIRIDE 2 MG/1
2 TABLET ORAL
Qty: 90 TABLET | Refills: 0
Start: 2023-01-23 | End: 2023-01-23 | Stop reason: SDUPTHER

## 2023-01-23 RX ORDER — ESCITALOPRAM OXALATE 10 MG/1
10 TABLET ORAL DAILY
Qty: 90 TABLET | Refills: 3 | Status: SHIPPED | OUTPATIENT
Start: 2023-01-23 | End: 2024-01-17 | Stop reason: SDUPTHER

## 2023-01-23 NOTE — PROGRESS NOTES
Discussed elevated blood sugar, what to do to lower, explained that doing some sort of exercise. Body movement, will help burn off sugar. Explained the high blood sugars ruin veins in the body, like the legs, kidneys and eyes. Pt and his wife verbalized understanding. Pt drank 8 oz water, has another 8 oz to drink.  Pt will start checking blood sugar at his home

## 2023-01-23 NOTE — TELEPHONE ENCOUNTER
----- Message from Rosa Sullivan sent at 1/23/2023  1:27 PM CST -----  Regarding: patient call back  [phone] - call back; she have questions about her  medical condition.    Rosa

## 2023-01-23 NOTE — PROGRESS NOTES
THIS DOCUMENT WAS MADE IN PART WITH VOICE RECOGNITION SOFTWARE.  OCCASIONALLY THIS SOFTWARE WILL MISINTERPRET WORDS OR PHRASES.      Primary Care Provider Appointment   [facility] ([facility])  [address], [place] [address]   Ph: [phone]  Fax: [phone]      Patient ID: Cecilio Cespedes is a 82 y.o. male.    ASSESSMENT/PLAN by Problem List:  Problem List Items Addressed This Visit       Diabetic polyneuropathy associated with type 2 diabetes mellitus - Primary (Chronic)     He does have mild neuropathy.  This is stable.  Recent increase in A1c, see below for diabetes management.         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  I had reduce the glimepiride and eventually discontinue this because of safety recommendations in the elderly.  Although, his wife did not discontinue the 2 mg glimepiride and discontinue the pravastatin incorrectly.    Well at this point will continue 2 mg glimepiride, add Jardiance 10 mg, check glucose at home, return in three months for re-evaluation.  Extensive discussion regarding diet today with patient and wife present.         Relevant Medications    empagliflozin (JARDIANCE) 10 mg tablet    glimepiride (AMARYL) 2 MG tablet    Other Relevant Orders    Hemoglobin A1C    Lipid Panel    Comprehensive Metabolic Panel    Ambulatory referral/consult to Home Health    POCT Glucose, Hand-Held Device (Completed)    Stage 3 chronic kidney disease (Chronic)     Stable, continue to monitor, avoid NSAIDs.         Mixed hyperlipidemia (Chronic)     Stable continue pravastatin reviewed nutrition.         PVD (peripheral vascular disease) (Chronic)     Stable, no claudication no ulcerations.         Aortic atherosclerosis (Chronic)     Stable continue pravastatin 20 mg         Balance  problem    Relevant Orders    Ambulatory referral/consult to Home Health    Decreased strength    Relevant Orders    Ambulatory referral/consult to Home Health    Mild cognitive impairment    Major depressive disorder in partial remission     positive depression screen, overall he is doing well on Lexapro 10 mg.  Consider increasing the dosage however will hold off because of other changes today.  Follow in three months but call if any concerns.  No SI          Other Visit Diagnoses       Type 2 diabetes mellitus without complication, without long-term current use of insulin        Relevant Medications    glimepiride (AMARYL) 2 MG tablet    Dyslipidemia        Relevant Medications    pravastatin (PRAVACHOL) 20 MG tablet                Follow Up:  Three months    Forty-four minutes of total time spent on the encounter, which includes face to face time and non-face to face time preparing to see the patient (eg, review of tests), Obtaining and/or reviewing separately obtained history, Documenting clinical information in the electronic or other health record, Independently interpreting results (not separately reported) and communicating results to the patient/family/caregiver, or Care coordination (not separately reported).    Health Maintenance         Date Due Completion Date    Shingles Vaccine (2 of 3) 09/09/2016 7/15/2016    Hemoglobin A1c 04/17/2023 1/17/2023    Eye Exam 07/20/2023 7/20/2022    Override on 11/17/2016: Done ([facility])    Override on 11/3/2015: Done    Diabetes Urine Screening 01/17/2024 1/17/2023    Lipid Panel 01/17/2024 1/17/2023    TETANUS VACCINE 06/12/2024 6/12/2014 (Done)    Override on 6/12/2014: Done ([facility] - dsn't remember specific date)            Subjective:     Chief Complaint   Patient presents with    Follow-up     Routine f/u, blood work done last week     I have reviewed the information entered by the ancillary staff regarding the chief  "complaint as well as the related history.    HPI    Patient is a/an 82 y.o.  male   RISK of ADMIT/ED: 5    Reduced glimiperide, did not stop  Increase in sweets, soft drinks, processed carbs  Has not been checking glucose      For complete problem list, past medical history, surgical history, social history, etc., see appropriate section in the electronic medical record    Review of Systems   HENT: Negative.     Respiratory: Negative.     Cardiovascular: Negative.    Genitourinary:  Positive for frequency.   Musculoskeletal: Negative.      Objective     Physical Exam  Vitals reviewed.   Constitutional:       General: He is not in acute distress.     Appearance: He is well-developed. He is not diaphoretic.   HENT:      Head: Normocephalic and atraumatic.   Eyes:      General: No scleral icterus.     Conjunctiva/sclera: Conjunctivae normal.   Cardiovascular:      Rate and Rhythm: Normal rate and regular rhythm.      Heart sounds: Murmur heard.   Pulmonary:      Effort: Pulmonary effort is normal. No respiratory distress.   Musculoskeletal:      Cervical back: Normal range of motion and neck supple.   Skin:     General: Skin is warm and dry.   Neurological:      Mental Status: He is alert.      Deep Tendon Reflexes: Reflexes are normal and symmetric.   Psychiatric:         Behavior: Behavior normal.     Vitals:    01/23/23 1034   BP: 102/60   BP Location: Right arm   Patient Position: Sitting   BP Method: Medium (Manual)   Pulse: 96   Resp: 20   SpO2: 98%   Weight: 70.8 kg (156 lb 1.4 oz)   Height: 5' 8" (1.727 m)       RECENT LABS:    Lab Results   Component Value Date    WBC 6.77 09/12/2022    HGB 14.2 09/12/2022    HCT 43.0 09/12/2022     09/12/2022    CHOL 179 01/17/2023    TRIG 155 (H) 01/17/2023    HDL 39 (L) 01/17/2023    ALT 15 01/17/2023    AST 12 01/17/2023     01/17/2023    K 4.1 01/17/2023     01/17/2023    CREATININE 1.4 01/17/2023    BUN 19 01/17/2023    CO2 24 01/17/2023    TSH 2.301 " 01/17/2023    PSA 1.3 03/12/2019    HGBA1C 9.1 (H) 01/17/2023       Results for orders placed or performed in visit on 01/23/23   POCT Glucose, Hand-Held Device   Result Value Ref Range    POC Glucose 375 (A) 70 - 110 MG/DL

## 2023-01-23 NOTE — ASSESSMENT & PLAN NOTE
Significant increase in A1c.  I had reduce the glimepiride and eventually discontinue this because of safety recommendations in the elderly.  Although, his wife did not discontinue the 2 mg glimepiride and discontinue the pravastatin incorrectly.    Well at this point will continue 2 mg glimepiride, add Jardiance 10 mg, check glucose at home regularly, his wife will assist., return in three months for re-evaluation.  Extensive discussion regarding diet today with patient and wife present.

## 2023-01-23 NOTE — ASSESSMENT & PLAN NOTE
positive depression screen, overall he is doing well on Lexapro 10 mg.  Consider increasing the dosage however will hold off because of other changes today.  Follow in three months but call if any concerns.  No SI

## 2023-01-23 NOTE — ASSESSMENT & PLAN NOTE
He does have mild neuropathy.  This is stable.  Recent increase in A1c, see below for diabetes management.

## 2023-01-24 PROCEDURE — G0180 MD CERTIFICATION HHA PATIENT: HCPCS | Mod: ,,, | Performed by: FAMILY MEDICINE

## 2023-01-24 PROCEDURE — G0180 PR HOME HEALTH MD CERTIFICATION: ICD-10-PCS | Mod: ,,, | Performed by: FAMILY MEDICINE

## 2023-01-25 ENCOUNTER — TELEPHONE (OUTPATIENT)
Dept: PRIMARY CARE CLINIC | Facility: CLINIC | Age: 83
End: 2023-01-25
Payer: MEDICARE

## 2023-01-25 NOTE — TELEPHONE ENCOUNTER
Spoke to Karrie, she verbalized her understanding of Dr. West's recommendations and also stated that the patients Glimepiride was out of the basket of medications they have at home and so the patient has not been taking it for at least 4 months that she knows of. I informed her that I would bring this to Dr. West's attention.

## 2023-01-25 NOTE — TELEPHONE ENCOUNTER
No that is perfectly okay.  Now if he starts to drop into the low 100s or below 100 then I would want to back down.  But as long as he is in the 130-200 range then he should be fine.

## 2023-01-25 NOTE — TELEPHONE ENCOUNTER
Spoke to patients wife Karrie, she is wanting to know if the patients blood sugar is dropping too low too fast since his medications were changed at his visit on Monday. His reading yesterday at 1:00 pm was 311, at 6:30 pm yesterday before dinner it was 211, and then this morning before breakfast it was 168. Patient feels good other than some fatigue. Please advise because wife is concerned that he is dropping too quickly.

## 2023-01-26 ENCOUNTER — TELEPHONE (OUTPATIENT)
Dept: PRIMARY CARE CLINIC | Facility: CLINIC | Age: 83
End: 2023-01-26
Payer: MEDICARE

## 2023-01-26 NOTE — TELEPHONE ENCOUNTER
Pt wife called, Ms. Yoon, answered her questions about dm diet. No other questions.  Once PT is done in the home, pt will come to clinic to see the health .

## 2023-01-26 NOTE — TELEPHONE ENCOUNTER
----- Message from Hyun Soriano RN sent at 1/24/2023  9:21 AM CST -----  Regarding: FW: call back    ----- Message -----  From: Mckayla Barrow  Sent: 1/24/2023   9:16 AM CST  To: [facility] Staff  Subject: call back                                        His wife called, and stated that she was told by Miss Ochoa that she will call them, but as of now they haven't heard back from her. She has some questions regarding her  visit yesterday. Please calll back @ [phone].

## 2023-02-09 ENCOUNTER — TELEPHONE (OUTPATIENT)
Dept: PRIMARY CARE CLINIC | Facility: CLINIC | Age: 83
End: 2023-02-09
Payer: MEDICARE

## 2023-02-09 NOTE — TELEPHONE ENCOUNTER
----- Message from Suellen Polanco MA sent at 2/8/2023  2:40 PM CST -----  Regarding: Two wheel walker for pt  Contact: pt [phone]  Spoke to Antonia ibrahim/ [facility] and she said orders were sent to clinic. Can you assist with this at your earliest convenience.    Thanks  SJ  ----- Message -----  From: Bebo Adorno  Sent: 2/8/2023   1:57 PM CST  To: [facility] Staff    Antonia with [employer] [facility] @ [phone] is following up on request for 2 wheel walker. Please call and advise.    Thank you and have a great day.

## 2023-02-23 ENCOUNTER — EXTERNAL HOME HEALTH (OUTPATIENT)
Dept: HOME HEALTH SERVICES | Facility: HOSPITAL | Age: 83
End: 2023-02-23
Payer: MEDICARE

## 2023-04-15 ENCOUNTER — DOCUMENT SCAN (OUTPATIENT)
Dept: HOME HEALTH SERVICES | Facility: HOSPITAL | Age: 83
End: 2023-04-15
Payer: MEDICARE

## 2023-04-24 ENCOUNTER — LAB VISIT (OUTPATIENT)
Dept: LAB | Facility: HOSPITAL | Age: 83
End: 2023-04-24
Attending: FAMILY MEDICINE
Payer: MEDICARE

## 2023-04-24 DIAGNOSIS — E11.42 TYPE 2 DIABETES MELLITUS WITH DIABETIC POLYNEUROPATHY, WITHOUT LONG-TERM CURRENT USE OF INSULIN: Chronic | ICD-10-CM

## 2023-04-24 LAB
ALBUMIN SERPL BCP-MCNC: 4.1 G/DL (ref 3.5–5.2)
ALP SERPL-CCNC: 56 U/L (ref 55–135)
ALT SERPL W/O P-5'-P-CCNC: 21 U/L (ref 10–44)
ANION GAP SERPL CALC-SCNC: 11 MMOL/L (ref 8–16)
AST SERPL-CCNC: 18 U/L (ref 10–40)
BILIRUB SERPL-MCNC: 0.4 MG/DL (ref 0.1–1)
BUN SERPL-MCNC: 27 MG/DL (ref 8–23)
CALCIUM SERPL-MCNC: 10 MG/DL (ref 8.7–10.5)
CHLORIDE SERPL-SCNC: 106 MMOL/L (ref 95–110)
CO2 SERPL-SCNC: 25 MMOL/L (ref 23–29)
CREAT SERPL-MCNC: 1.5 MG/DL (ref 0.5–1.4)
EST. GFR  (NO RACE VARIABLE): 45.6 ML/MIN/1.73 M^2
ESTIMATED AVG GLUCOSE: 143 MG/DL (ref 68–131)
GLUCOSE SERPL-MCNC: 157 MG/DL (ref 70–110)
HBA1C MFR BLD: 6.6 % (ref 4–5.6)
POTASSIUM SERPL-SCNC: 4.5 MMOL/L (ref 3.5–5.1)
PROT SERPL-MCNC: 7.4 G/DL (ref 6–8.4)
SODIUM SERPL-SCNC: 142 MMOL/L (ref 136–145)

## 2023-04-24 PROCEDURE — 83036 HEMOGLOBIN GLYCOSYLATED A1C: CPT | Performed by: FAMILY MEDICINE

## 2023-04-24 PROCEDURE — 36415 COLL VENOUS BLD VENIPUNCTURE: CPT | Mod: PN | Performed by: FAMILY MEDICINE

## 2023-04-24 PROCEDURE — 80053 COMPREHEN METABOLIC PANEL: CPT | Performed by: FAMILY MEDICINE

## 2023-05-01 ENCOUNTER — DOCUMENTATION ONLY (OUTPATIENT)
Dept: PRIMARY CARE CLINIC | Facility: CLINIC | Age: 83
End: 2023-05-01

## 2023-05-01 ENCOUNTER — OFFICE VISIT (OUTPATIENT)
Dept: PRIMARY CARE CLINIC | Facility: CLINIC | Age: 83
End: 2023-05-01
Payer: MEDICARE

## 2023-05-01 VITALS
HEIGHT: 68 IN | HEART RATE: 80 BPM | WEIGHT: 151 LBS | SYSTOLIC BLOOD PRESSURE: 122 MMHG | OXYGEN SATURATION: 98 % | DIASTOLIC BLOOD PRESSURE: 66 MMHG | TEMPERATURE: 98 F | BODY MASS INDEX: 22.88 KG/M2 | RESPIRATION RATE: 17 BRPM

## 2023-05-01 DIAGNOSIS — R26.89 BALANCE PROBLEM: ICD-10-CM

## 2023-05-01 DIAGNOSIS — R53.82 CHRONIC FATIGUE: ICD-10-CM

## 2023-05-01 DIAGNOSIS — E11.9 TYPE 2 DIABETES MELLITUS WITHOUT COMPLICATION, WITHOUT LONG-TERM CURRENT USE OF INSULIN: ICD-10-CM

## 2023-05-01 DIAGNOSIS — F32.4 MAJOR DEPRESSIVE DISORDER IN PARTIAL REMISSION, UNSPECIFIED WHETHER RECURRENT: ICD-10-CM

## 2023-05-01 DIAGNOSIS — G31.84 MILD COGNITIVE IMPAIRMENT: ICD-10-CM

## 2023-05-01 DIAGNOSIS — E11.42 TYPE 2 DIABETES MELLITUS WITH DIABETIC POLYNEUROPATHY, WITHOUT LONG-TERM CURRENT USE OF INSULIN: Primary | Chronic | ICD-10-CM

## 2023-05-01 PROCEDURE — 99999 PR PBB SHADOW E&M-EST. PATIENT-LVL V: CPT | Mod: PBBFAC,,, | Performed by: FAMILY MEDICINE

## 2023-05-01 PROCEDURE — 3288F FALL RISK ASSESSMENT DOCD: CPT | Mod: CPTII,S$GLB,, | Performed by: FAMILY MEDICINE

## 2023-05-01 PROCEDURE — 1126F AMNT PAIN NOTED NONE PRSNT: CPT | Mod: CPTII,S$GLB,, | Performed by: FAMILY MEDICINE

## 2023-05-01 PROCEDURE — 3288F PR FALLS RISK ASSESSMENT DOCUMENTED: ICD-10-PCS | Mod: CPTII,S$GLB,, | Performed by: FAMILY MEDICINE

## 2023-05-01 PROCEDURE — 1126F PR PAIN SEVERITY QUANTIFIED, NO PAIN PRESENT: ICD-10-PCS | Mod: CPTII,S$GLB,, | Performed by: FAMILY MEDICINE

## 2023-05-01 PROCEDURE — 99215 PR OFFICE/OUTPT VISIT, EST, LEVL V, 40-54 MIN: ICD-10-PCS | Mod: S$GLB,,, | Performed by: FAMILY MEDICINE

## 2023-05-01 PROCEDURE — 3072F PR LOW RISK FOR RETINOPATHY: ICD-10-PCS | Mod: CPTII,S$GLB,, | Performed by: FAMILY MEDICINE

## 2023-05-01 PROCEDURE — 1159F PR MEDICATION LIST DOCUMENTED IN MEDICAL RECORD: ICD-10-PCS | Mod: CPTII,S$GLB,, | Performed by: FAMILY MEDICINE

## 2023-05-01 PROCEDURE — 3074F PR MOST RECENT SYSTOLIC BLOOD PRESSURE < 130 MM HG: ICD-10-PCS | Mod: CPTII,S$GLB,, | Performed by: FAMILY MEDICINE

## 2023-05-01 PROCEDURE — 3072F LOW RISK FOR RETINOPATHY: CPT | Mod: CPTII,S$GLB,, | Performed by: FAMILY MEDICINE

## 2023-05-01 PROCEDURE — 3078F PR MOST RECENT DIASTOLIC BLOOD PRESSURE < 80 MM HG: ICD-10-PCS | Mod: CPTII,S$GLB,, | Performed by: FAMILY MEDICINE

## 2023-05-01 PROCEDURE — 1160F RVW MEDS BY RX/DR IN RCRD: CPT | Mod: CPTII,S$GLB,, | Performed by: FAMILY MEDICINE

## 2023-05-01 PROCEDURE — 99215 OFFICE O/P EST HI 40 MIN: CPT | Mod: S$GLB,,, | Performed by: FAMILY MEDICINE

## 2023-05-01 PROCEDURE — 3074F SYST BP LT 130 MM HG: CPT | Mod: CPTII,S$GLB,, | Performed by: FAMILY MEDICINE

## 2023-05-01 PROCEDURE — 3078F DIAST BP <80 MM HG: CPT | Mod: CPTII,S$GLB,, | Performed by: FAMILY MEDICINE

## 2023-05-01 PROCEDURE — 1159F MED LIST DOCD IN RCRD: CPT | Mod: CPTII,S$GLB,, | Performed by: FAMILY MEDICINE

## 2023-05-01 PROCEDURE — 1101F PR PT FALLS ASSESS DOC 0-1 FALLS W/OUT INJ PAST YR: ICD-10-PCS | Mod: CPTII,S$GLB,, | Performed by: FAMILY MEDICINE

## 2023-05-01 PROCEDURE — 99999 PR PBB SHADOW E&M-EST. PATIENT-LVL V: ICD-10-PCS | Mod: PBBFAC,,, | Performed by: FAMILY MEDICINE

## 2023-05-01 PROCEDURE — 1160F PR REVIEW ALL MEDS BY PRESCRIBER/CLIN PHARMACIST DOCUMENTED: ICD-10-PCS | Mod: CPTII,S$GLB,, | Performed by: FAMILY MEDICINE

## 2023-05-01 PROCEDURE — 1101F PT FALLS ASSESS-DOCD LE1/YR: CPT | Mod: CPTII,S$GLB,, | Performed by: FAMILY MEDICINE

## 2023-05-01 NOTE — PROGRESS NOTES
Patient is presenting for scheduled appointment with MD. She is being referred to clinician secondary to recent onset of lack of motivation, anhedonia.  Clinician met with client regarding interest in receiving individual therapy. Patient verbalized agreement and is scheduled to return on  5/11/2023 at 10:00.

## 2023-05-01 NOTE — PROGRESS NOTES
THIS DOCUMENT WAS MADE IN PART WITH VOICE RECOGNITION SOFTWARE.  OCCASIONALLY THIS SOFTWARE WILL MISINTERPRET WORDS OR PHRASES.      Primary Care Provider Appointment   [facility]       Patient ID: Cecilio Cespedes is a 83 y.o. male.    ASSESSMENT/PLAN by Problem List:    1. Type 2 diabetes mellitus with diabetic polyneuropathy, without long-term current use of insulin  Assessment & Plan:  His diabetes is doing much better.  Continue Jardiance.  If his A1c remains excellent I may look at reducing the glimepiride as previously discussed.  He will need to continue to work on healthy diet.    Orders:  -     empagliflozin (JARDIANCE) 10 mg tablet; Take 1 tablet (10 mg total) by mouth once daily.  Dispense: 90 tablet; Refill: 3    2. Type 2 diabetes mellitus without complication, without long-term current use of insulin  -     Hemoglobin A1C; Future; Expected date: 05/01/2023  -     Comprehensive Metabolic Panel; Future; Expected date: 05/01/2023  -     Lipid Panel; Future; Expected date: 05/01/2023    3. Major depressive disorder in partial remission, unspecified whether recurrent  -     Ambulatory referral/consult to Adult Neuropsychology; Future; Expected date: 05/08/2023  -     Ambulatory referral/consult to Value Based Primary Care Behavioral Health; Future; Expected date: 05/08/2023    4. Chronic fatigue  -     Ambulatory referral/consult to Adult Neuropsychology; Future; Expected date: 05/08/2023  -     Ambulatory referral/consult to Value Based Primary Care Behavioral Health; Future; Expected date: 05/08/2023    5. Balance problem  Assessment & Plan:  Patient did home health and PT for awhile but his wife feels the physical therapy did not last long enough although he was not really motivated showing improvement.  She does have some concerns about using the walker correctly and would like a brief outpatient consult to measure the walker high correctly and instruct him.  She  is not convinced he will want to do long-term PT.  Referral entered.    Orders:  -     Ambulatory referral/consult to Physical/Occupational Therapy; Future; Expected date: 05/08/2023    6. Mild cognitive impairment     Patient continues to have very little motivation, fatigue, excessive sleepiness.  Concern was possible depression but has not improve with Lexapro.  I recommended consultation with neuropsychology for further evaluation and referral to [facility] for individual cognitive behavioral therapy.      Follow Up:  3-4 months    43 minutes of total time spent on the encounter, time includes face to face time, and some or all of the following: review of chart, lab, imaging, consultant notes, ER, hospital, documentation, care coordination, etc.    Subjective:     Chief Complaint   Patient presents with    Follow-up     Discuss labs     I have reviewed the information entered by the ancillary staff regarding the chief complaint as well as the related history.    HPI    Patient is a/an 83 y.o.  male     He is here with his wife.  Diabetes looks much better.  Following a more reasonable diet and continues with his medications regularly now.      His wife mentioned that he continues to remain tired, no motivation.  We have tried to treat his depression but it has not helped..  See above for all details    For complete problem list, past medical history, surgical history, social history, etc., see appropriate section in the electronic medical record    Review of Systems   HENT: Negative.     Respiratory: Negative.     Cardiovascular: Negative.    Gastrointestinal: Negative.    Neurological:  Positive for weakness.   Psychiatric/Behavioral:  Positive for dysphoric mood.      Objective     Physical Exam  Vitals reviewed.   Constitutional:       General: He is not in acute distress.     Appearance: He is well-developed. He is not diaphoretic.   HENT:      Head: Normocephalic and atraumatic.   Eyes:      General: No scleral  "icterus.     Conjunctiva/sclera: Conjunctivae normal.   Cardiovascular:      Rate and Rhythm: Normal rate and regular rhythm.      Heart sounds: Murmur heard.   Pulmonary:      Effort: Pulmonary effort is normal. No respiratory distress.   Musculoskeletal:      Cervical back: Normal range of motion and neck supple.   Skin:     General: Skin is warm and dry.   Neurological:      Mental Status: He is alert.      Deep Tendon Reflexes: Reflexes are normal and symmetric.      Comments: He was brought back in a wheelchair today   Psychiatric:         Behavior: Behavior normal.     Vitals:    05/01/23 1031   BP: 122/66   BP Location: Right arm   Patient Position: Sitting   BP Method: Medium (Manual)   Pulse: 80   Resp: 17   Temp: 97.8 °F (36.6 °C)   TempSrc: Oral   SpO2: 98%   Weight: 68.5 kg (151 lb 0.2 oz)   Height: 5' 8" (1.727 m)       "

## 2023-05-02 ENCOUNTER — TELEPHONE (OUTPATIENT)
Dept: PRIMARY CARE CLINIC | Facility: CLINIC | Age: 83
End: 2023-05-02
Payer: MEDICARE

## 2023-05-02 DIAGNOSIS — F32.4 MAJOR DEPRESSIVE DISORDER IN PARTIAL REMISSION, UNSPECIFIED WHETHER RECURRENT: Primary | ICD-10-CM

## 2023-05-09 ENCOUNTER — CLINICAL SUPPORT (OUTPATIENT)
Dept: REHABILITATION | Facility: HOSPITAL | Age: 83
End: 2023-05-09
Attending: FAMILY MEDICINE
Payer: MEDICARE

## 2023-05-09 DIAGNOSIS — R26.89 BALANCE PROBLEM: ICD-10-CM

## 2023-05-09 DIAGNOSIS — R26.9 GAIT DIFFICULTY: ICD-10-CM

## 2023-05-09 PROCEDURE — 97530 THERAPEUTIC ACTIVITIES: CPT | Mod: PN | Performed by: PHYSICAL THERAPIST

## 2023-05-09 PROCEDURE — 97112 NEUROMUSCULAR REEDUCATION: CPT | Mod: PN | Performed by: PHYSICAL THERAPIST

## 2023-05-09 PROCEDURE — 97162 PT EVAL MOD COMPLEX 30 MIN: CPT | Mod: PN | Performed by: PHYSICAL THERAPIST

## 2023-05-09 NOTE — PATIENT INSTRUCTIONS
Marching in Place    March in place slowly, with high knees. 20x         STANDING HEEL RAISES    While standing, raise up on your toes as you lift your heels off the ground. 20 times        Sit to Stand    Feet shoulder width apart. Repeat 5-10x times. Stand tall each time.Repeat every hour or two.      SINGLE LEG STANCE - LATERAL SLS  Stand on one leg and maintain your balance.   Next, hold your leg out to the side of your body.   Maintain a slightly bent knee on the stance side. Hold for 10 seconds. Repeat 3 times on each leg.

## 2023-05-09 NOTE — PLAN OF CARE
[facility]   Physical Therapy Initial Evaluation        Date: 5/9/2023   Name: Cecilio Cespedes  Clinic Number: [id number]    Therapy Diagnosis:   Encounter Diagnosis   Name Primary?    Balance problem      Physician: Mason West, *    Physician Orders: PT Eval and Treat   Medical Diagnosis from Referral: balance problem  Evaluation Date: 5/9/2023  Authorization Period Expiration: 4/30/24  Plan of Care Expiration: 8/9/23  Progress Note Due: 6/9/23  Visit # / Visits authorized: 1/ 1   FOTO: 1/3    Precautions: Standard and Diabetes     Time In: 10:55AM  Time Out: 11:55AM  Total Appointment Time (timed & untimed codes): 60 minutes      SUBJECTIVE     Date of onset: several years    History of current condition - Gene reports: he feels unsteady with gait and has a fear of falling. He has not fallen to date. He has been to PT for balance problems in past, but did not feel that he was benefitting from therapy and has not continued with exercise on his own. He recently had home health PT for 1-2 months. He admits to not continuing exercises on his own. A rolling walker was ordered, but did not come in until home health physical therapy had ended. He is accompanied by his wife today. They would like to have walker adjusted and get instructions on gait with walker. In further discussion, pt agreed to trying a few exercises on his own.    Current Activity Level: sedentary    Falls: no    Imaging, N/A     Prior Therapy: yes, 2021out pt PT, recent home health PT  Social History:   lives with their spouse  Occupation: retired  Prior Level of Function: enjoyed yard work, activity outdoors  Current Level of Function: sedentary    Pain:  Current 0/10, worst 0/10, best 0/10   Location: weakness of legs    Description: unsteady with gait  Aggravating Factors: Walking  Easing Factors: rest    Patients goals: to use walker appropriately, to prevent falls     Medical History:   Past Medical  History:   Diagnosis Date    Colon polyp     Diabetes 2001    Diverticulosis     Hyperlipidemia     PONV (postoperative nausea and vomiting)     Stage 3 chronic kidney disease 12/12/2018    Type 2 diabetes mellitus        Surgical History:   Cecilio Cespedes  has a past surgical history that includes Hernia repair; Colonoscopy (03/14/2011); Colonoscopy (N/A, 9/1/2020); and Cataract extraction (Left).    Medications:   Cecilio has a current medication list which includes the following prescription(s): aspirin-sod bicarb-citric acid, b complex vitamins, bisacodyl, blood sugar diagnostic, blood-glucose meter, empagliflozin, escitalopram oxalate, glimepiride, ipratropium, lancets, pravastatin, and sodium phosphates.    Allergies:   Review of patient's allergies indicates:   Allergen Reactions    No known allergies           OBJECTIVE       Posture:  Pt presents with postural abnormalities which include:    [x] Forward Head   [x] Decreased Lumbar Lordosis   [x] Rounded Shoulder   [] Genu Recurvatum   [x] Increased Thoracic Kyphosis [] Genu Valgus   [] Trunk Deviated    [] Pes Planus   [] Scapular Winging    [] Other:     Flexibility:    Muscle Tested  Right Left  Limitation   Hip Flexors [] Normal      [x] Limited [] Normal      [x] Limited    Hamstrings  [] Normal      [x] Limited [] Normal      [x] Limited    Gastrocnemius  [] Normal      [x] Limited [] Normal      [x] Limited           Strength:    L/E MMT Right  (spine) Left   Modified (90/90) Abdominal Strength  4/5 ---   Hip Flexion  4/5 4/5   Hip Extension  4-/5 4-/5   Hip Abduction  4-/5 4-/5   Knee Extension 4+/5 4+/5   Knee Flexion 5/5 5/5   Hip IR 4/5 4/5   Hip ER 4/5 4/5   Ankle DF 5/5 5/5   Ankle PF 4/5 4/5        Sensation:  [x] Intact to Light Touch   [] Impaired:    Gait Analysis: The patient ambulated with the following assistive device: rolling walker; the pt presents with the following gait abnormalities: decreased step length bilateral, decreased  heel strike bilateral, decreased push off bilateral, decreased knee flexion bilateral, and shuffling gait with flexed posture    Fall risk assessment:    Single leg stance Right: 0 seconds ; Left 0 seconds- requires hand held assist Less than 4.9 sec high risk  5-6.4 sec increased risk  6.5 or greater low risk   5x sit to stand 25 seconds  Slow, but was able to complete without hand held assist Greater than 14 sec high risk  12.1-14 sec increased risk  12 sec or less low risk   Timed up and go 35 seconds Greater than 14 sec high risk  11.1-14 sec increased risk  11 sec or less low risk             Limitation/Restriction for FOTO balance Survey    Therapist reviewed FOTO scores for Cecilio Cespedes on 5/9/2023.   FOTO documents entered into [employer] - see Media section.    Limitation Score: not captured         TREATMENT     Total Treatment time (time-based codes) separate from Evaluation: 40 minutes      Gene received the treatments listed below:      neuromuscular re-education activities to improve: Balance, Coordination, Kinesthetic, Sense, Proprioception, and Posture for 30 minutes. The following activities were included:  Walking with rolling walker with focus on standing erect, closer to walker, longer steps, heel strike addressing balance issues 200'  Heel raises 2/10  March in place (knees up) x20  Isometric hip abduction 10 sec x3 ea  Sit to stand 3/5      therapeutic activities to improve functional performance for 10  minutes, including:  Education regarding fall risks around home, including:  -Hand rail in bathroom  - seat in shower, getting into/out of shower  -area rugs, thresholds  -well lit path for night time, sitting on side of bed before getting up, using walker and having it next to bed  - discussed fear of falling as a fall risk  - importance of daily exercise to improve strength and balance    PATIENT EDUCATION AND HOME EXERCISES     Education provided:   - Patient  was instructed in home exercise  program  to address the deficits listed above and to address overall condition and quality of life. Patient  was encouraged to participate in cardiovascular exercise and wellness exercise in fitness center with assistance of health  at [facility] location.   - Patient was educated on all the above exercise prior/during/after for proper posture, positioning, and execution for safe performance with home exercise program.    Written Home Exercises Provided: yes. Exercises were reviewed and Carl was able to demonstrate them prior to the end of the session.  Gene demonstrated good  understanding of the education provided. See EMR under Patient Instructions for exercises provided during therapy sessions.    ASSESSMENT     Cecilio is a 83 y.o. male referred to outpatient Physical Therapy with a medical diagnosis of balance problem. The patient presents with signs and symptoms consistent with diagnosis along with gait difficulty and impairments which include weakness, gait instability, impaired balance, and decreased lower extremity function. He has some issues with depression which are being addressed and lack of motivation. Discussed at length the components of his function with gait that are under his control and what he can expect if he can commit to doing a few exercises. He has a tendency to lean forward with gait and then starts taking short, shuffling steps. He needed repeated cueing to correct this. Discussed activities he enjoyed doing. He states he used to enjoy being outdoors, but sits inside most of day. Encouraged him to set up a chair outside to enjoy sitting outside a little bit. Encouraged him to do 4 exercises throughout day that should help improve his function with balance and gait. Will follow up by phone in 2 weeks. Pt did not want to commit to more frequent physical therapy at this time. Walker was adjusted to appropriate height and pt was instructed in proper use.    Patient  will require follow  up with physical therapy to monitor and establish safe and effective exercise program    Patient prognosis is Fair.   Patient will benefit from skilled outpatient Physical Therapy to address the deficits stated above and in the chart below, provide patient /family education, and to maximize patientt's level of independence.     Plan of care discussed with patient: Yes  Patient's spiritual, cultural and educational needs considered and patient is agreeable to the plan of care and goals as stated below:     Anticipated Barriers for therapy: adherence to treatment plan and coping style    Medical Necessity is demonstrated by the following :  History  Co-morbidities and personal factors that may impact the plan of care [] LOW: no personal factors / co-morbidities  [] MODERATE: 1-2 personal factors / co-morbidities  [x] HIGH: 3+ personal factors / co-morbidities    Moderate / High Support Documentation:   Past Medical History:   Diagnosis Date    Colon polyp     Diabetes 2001    Diverticulosis     Hyperlipidemia     PONV (postoperative nausea and vomiting)     Stage 3 chronic kidney disease 12/12/2018    Type 2 diabetes mellitus          Examination  Body Structures and Functions, activity limitations and participation restrictions that may impact the plan of care [] LOW: addressing 1-2 elements  [] MODERATE: 3+ elements  [x] HIGH: 3+ elements (please support below)    Moderate / High Support Documentation: see evaluation/objective measurements above.  Gait, balance, strength, community life, leisure/recreation, mobility   Clinical Presentation [] LOW: stable  [x] MODERATE: Evolving  [] HIGH: Unstable     Decision Making/ Complexity Score: moderate       Short term goals: 4 weeks  Pt will improve flexibility of hamstrings and heel cords to improve balance and reduce risk of falls.  Pt will improve score on TUG test by 10% to achieve steadiness with gait activities.  Pt will decrease time to complete 5 rep sit to stand to  improve lower extremity strength needed for steadiness with gait.  Pt will be independent with home exercise program of balance and lower extremity exercises to be performed daily.    Long term goals: 8 weeks  Pt will be able to ambulate in home and community safely with assistive device on varied terrain.  2.   Pt will be independent with self management of his/her condition with home exercise program, posture, positioning and improved body mechanics.  3.   Pt will safely transition to and participate in wellness/fitness program.      PLAN   Plan of care Certification: 5/9/2023 to 8/9/23.     Patient is being seen for 1 visit to establish safe and effective home exercise program that can be performed independently. Will contact pt in 2-3 weeks to monitor his consistency with exercise. Will discuss coming into fitness center and working with health  to safely exercise.  Pearl Atkinson, PT      I CERTIFY THE NEED FOR THESE SERVICES FURNISHED UNDER THIS PLAN OF TREATMENT AND WHILE UNDER MY CARE   Physician's comments:     Physician's Signature: ___________________________________________________       .temp

## 2023-05-12 ENCOUNTER — TELEPHONE (OUTPATIENT)
Dept: PRIMARY CARE CLINIC | Facility: CLINIC | Age: 83
End: 2023-05-12
Payer: MEDICARE

## 2023-05-12 NOTE — TELEPHONE ENCOUNTER
Contacted pt to reschedule canceled appointment. His wife stated he is sleeping. She acknowledged pt has a hx of agreeing to appointments, but then does not follow up. She verbalized agreement to talk to pt about rescheduling. She was encouraged to contact clinician if needed.

## 2023-05-15 ENCOUNTER — OFFICE VISIT (OUTPATIENT)
Dept: UROLOGY | Facility: CLINIC | Age: 83
End: 2023-05-15
Payer: MEDICARE

## 2023-05-15 VITALS — HEIGHT: 68 IN | BODY MASS INDEX: 22.96 KG/M2

## 2023-05-15 DIAGNOSIS — R39.15 URINARY URGENCY: ICD-10-CM

## 2023-05-15 DIAGNOSIS — R35.1 NOCTURIA MORE THAN TWICE PER NIGHT: ICD-10-CM

## 2023-05-15 DIAGNOSIS — R35.0 FREQUENCY OF URINATION: Primary | ICD-10-CM

## 2023-05-15 LAB
BILIRUBIN, UA POC OHS: NEGATIVE
BLOOD, UA POC OHS: NEGATIVE
CLARITY, UA POC OHS: CLEAR
COLOR, UA POC OHS: YELLOW
GLUCOSE, UA POC OHS: >=1000
KETONES, UA POC OHS: NEGATIVE
LEUKOCYTES, UA POC OHS: NEGATIVE
NITRITE, UA POC OHS: NEGATIVE
PH, UA POC OHS: 6
PROTEIN, UA POC OHS: NEGATIVE
SPECIFIC GRAVITY, UA POC OHS: 1.02
UROBILINOGEN, UA POC OHS: 1

## 2023-05-15 PROCEDURE — 1101F PR PT FALLS ASSESS DOC 0-1 FALLS W/OUT INJ PAST YR: ICD-10-PCS | Mod: CPTII,S$GLB,, | Performed by: UROLOGY

## 2023-05-15 PROCEDURE — 3072F PR LOW RISK FOR RETINOPATHY: ICD-10-PCS | Mod: CPTII,S$GLB,, | Performed by: UROLOGY

## 2023-05-15 PROCEDURE — 1159F PR MEDICATION LIST DOCUMENTED IN MEDICAL RECORD: ICD-10-PCS | Mod: CPTII,S$GLB,, | Performed by: UROLOGY

## 2023-05-15 PROCEDURE — 1101F PT FALLS ASSESS-DOCD LE1/YR: CPT | Mod: CPTII,S$GLB,, | Performed by: UROLOGY

## 2023-05-15 PROCEDURE — 81003 URINALYSIS AUTO W/O SCOPE: CPT | Mod: QW,S$GLB,, | Performed by: UROLOGY

## 2023-05-15 PROCEDURE — 99203 OFFICE O/P NEW LOW 30 MIN: CPT | Mod: S$GLB,,, | Performed by: UROLOGY

## 2023-05-15 PROCEDURE — 99203 PR OFFICE/OUTPT VISIT, NEW, LEVL III, 30-44 MIN: ICD-10-PCS | Mod: S$GLB,,, | Performed by: UROLOGY

## 2023-05-15 PROCEDURE — 99999 PR PBB SHADOW E&M-EST. PATIENT-LVL II: ICD-10-PCS | Mod: PBBFAC,,, | Performed by: UROLOGY

## 2023-05-15 PROCEDURE — 99999 PR PBB SHADOW E&M-EST. PATIENT-LVL II: CPT | Mod: PBBFAC,,, | Performed by: UROLOGY

## 2023-05-15 PROCEDURE — 1159F MED LIST DOCD IN RCRD: CPT | Mod: CPTII,S$GLB,, | Performed by: UROLOGY

## 2023-05-15 PROCEDURE — 3072F LOW RISK FOR RETINOPATHY: CPT | Mod: CPTII,S$GLB,, | Performed by: UROLOGY

## 2023-05-15 PROCEDURE — 3288F PR FALLS RISK ASSESSMENT DOCUMENTED: ICD-10-PCS | Mod: CPTII,S$GLB,, | Performed by: UROLOGY

## 2023-05-15 PROCEDURE — 81003 POCT URINALYSIS(INSTRUMENT): ICD-10-PCS | Mod: QW,S$GLB,, | Performed by: UROLOGY

## 2023-05-15 PROCEDURE — 3288F FALL RISK ASSESSMENT DOCD: CPT | Mod: CPTII,S$GLB,, | Performed by: UROLOGY

## 2023-05-15 RX ORDER — MIRABEGRON 50 MG/1
50 TABLET, FILM COATED, EXTENDED RELEASE ORAL DAILY
Qty: 30 TABLET | Refills: 11 | Status: SHIPPED | OUTPATIENT
Start: 2023-05-15 | End: 2023-11-15

## 2023-05-15 NOTE — PROGRESS NOTES
Subjective:       Patient ID: Cecilio Cespedes is a 83 y.o. male.    Chief Complaint: Urinary Frequency    HPI    83-year-old with urinary incontinence beginning about 6 months ago.  He is seen today with his wife.  He is had symptoms of urinary frequency and urgency and nocturia for a while but have gotten more severe in the last 6 months.  He denies hematuria and dysuria.  His urinalysis is clear today.  He is not tried any previous BPH medications.  He is had no previous prostate surgery.  Urine dipstick shows negative for all components except glucose  PVR:  5 ml      Past Medical History:   Diagnosis Date    Colon polyp     Diabetes 2001    Diverticulosis     Hyperlipidemia     PONV (postoperative nausea and vomiting)     Stage 3 chronic kidney disease 12/12/2018    Type 2 diabetes mellitus      Past Surgical History:   Procedure Laterality Date    CATARACT EXTRACTION Left     COLONOSCOPY  03/14/2011    Dr. Dejesus: 2 colon polyps removed (one was 10 mm), diverticulosis, hemorrhoids, repeat in 3 years for surveillance; biopsy: FRAGMENTS OF TUBULAR ADENOMA.    COLONOSCOPY N/A 9/1/2020    Procedure: COLONOSCOPY;  Surgeon: Denny Dejesus MD;  Location: [facility];  Service: Endoscopy;  Laterality: N/A;    HERNIA REPAIR         Current Outpatient Medications:     aspirin-sod bicarb-citric acid (CHASE-SELTZER) 324 mg TbEF, Take 325 mg by mouth every 6 (six) hours as needed., Disp: , Rfl:     b complex vitamins tablet, Take 1 tablet by mouth once daily., Disp: , Rfl:     bisacodyl (DULCOLAX) 5 mg EC tablet, Take 5 mg by mouth daily as needed for Constipation., Disp: , Rfl:     blood sugar diagnostic Strp, Check blood glucose once to twice daily, Disp: 200 each, Rfl: PRN    blood-glucose meter kit, Check blood glucose once to twice daily, Disp: 1 each, Rfl: PRN    empagliflozin (JARDIANCE) 10 mg tablet, Take 1 tablet (10 mg total) by mouth once daily., Disp: 90 tablet, Rfl: 3    EScitalopram oxalate (LEXAPRO) 10  MG tablet, Take 1 tablet (10 mg total) by mouth once daily., Disp: 90 tablet, Rfl: 3    glimepiride (AMARYL) 2 MG tablet, Take 1 tablet (2 mg total) by mouth daily with breakfast., Disp: 90 tablet, Rfl: 0    ipratropium (ATROVENT) 42 mcg (0.06 %) nasal spray, Use 2 sprays by Nasal route 3 (three) times daily. Use 2-3 times per day if necessary for chronic nasal drip, Disp: 15 mL, Rfl: 12    lancets 28 gauge Misc, Check blood glucose once to twice daily, Disp: 200 each, Rfl: PRN    pravastatin (PRAVACHOL) 20 MG tablet, Take 1 tablet (20 mg total) by mouth once daily., Disp: 90 tablet, Rfl: 3    sodium phosphates (FLEET PEDIATRIC) 9.5-3.5 gram/59 mL Enem, Place 1 enema rectally as needed., Disp: , Rfl:     mirabegron (MYRBETRIQ) 50 mg Tb24, Take 1 tablet (50 mg total) by mouth once daily., Disp: 30 tablet, Rfl: 11      Review of Systems   Constitutional:  Negative for fever.   Genitourinary:  Positive for frequency and urgency. Negative for dysuria and hematuria.     Objective:      Physical Exam  Vitals reviewed.   Constitutional:       Appearance: He is well-developed.   Pulmonary:      Effort: Pulmonary effort is normal.   Skin:     Findings: No rash.   Neurological:      Mental Status: He is alert and oriented to person, place, and time.       Assessment:       1. Frequency of urination    2. Nocturia more than twice per night    3. Urinary urgency        Plan:       Frequency of urination  -     POCT Urinalysis(Instrument)  -     POCT Bladder Scan    Nocturia more than twice per night    Urinary urgency    Other orders  -     mirabegron (MYRBETRIQ) 50 mg Tb24; Take 1 tablet (50 mg total) by mouth once daily.  Dispense: 30 tablet; Refill: 11      I recommended trial of Myrbetriq.  Samples given.

## 2023-06-05 DIAGNOSIS — E11.9 TYPE 2 DIABETES MELLITUS WITHOUT COMPLICATION, WITHOUT LONG-TERM CURRENT USE OF INSULIN: ICD-10-CM

## 2023-06-05 RX ORDER — GLIMEPIRIDE 2 MG/1
2 TABLET ORAL
Qty: 90 TABLET | Refills: 0 | Status: CANCELLED | OUTPATIENT
Start: 2023-06-05

## 2023-06-05 RX ORDER — GLIMEPIRIDE 2 MG/1
2 TABLET ORAL
Qty: 90 TABLET | Refills: 0 | Status: SHIPPED | OUTPATIENT
Start: 2023-06-05 | End: 2023-09-05 | Stop reason: SDUPTHER

## 2023-06-05 NOTE — TELEPHONE ENCOUNTER
No care due was identified.  [facility] Embedded Care Due Messages. Reference number: [id number].   6/05/2023 10:40:54 AM CDT

## 2023-06-22 ENCOUNTER — PATIENT MESSAGE (OUTPATIENT)
Dept: OPTOMETRY | Facility: CLINIC | Age: 83
End: 2023-06-22
Payer: MEDICARE

## 2023-08-07 ENCOUNTER — OFFICE VISIT (OUTPATIENT)
Dept: OPTOMETRY | Facility: CLINIC | Age: 83
End: 2023-08-07
Payer: MEDICARE

## 2023-08-07 DIAGNOSIS — Z96.1 PSEUDOPHAKIA, LEFT EYE: ICD-10-CM

## 2023-08-07 DIAGNOSIS — H25.11 NUCLEAR SCLEROSIS OF RIGHT EYE: ICD-10-CM

## 2023-08-07 DIAGNOSIS — E11.9 TYPE 2 DIABETES MELLITUS WITHOUT RETINOPATHY: Primary | ICD-10-CM

## 2023-08-07 PROCEDURE — 3288F FALL RISK ASSESSMENT DOCD: CPT | Mod: CPTII,S$GLB,, | Performed by: OPTOMETRIST

## 2023-08-07 PROCEDURE — 1159F PR MEDICATION LIST DOCUMENTED IN MEDICAL RECORD: ICD-10-PCS | Mod: CPTII,S$GLB,, | Performed by: OPTOMETRIST

## 2023-08-07 PROCEDURE — 2023F DILAT RTA XM W/O RTNOPTHY: CPT | Mod: CPTII,S$GLB,, | Performed by: OPTOMETRIST

## 2023-08-07 PROCEDURE — 92014 COMPRE OPH EXAM EST PT 1/>: CPT | Mod: S$GLB,,, | Performed by: OPTOMETRIST

## 2023-08-07 PROCEDURE — 99999 PR PBB SHADOW E&M-EST. PATIENT-LVL III: ICD-10-PCS | Mod: PBBFAC,,, | Performed by: OPTOMETRIST

## 2023-08-07 PROCEDURE — 1160F RVW MEDS BY RX/DR IN RCRD: CPT | Mod: CPTII,S$GLB,, | Performed by: OPTOMETRIST

## 2023-08-07 PROCEDURE — 1159F MED LIST DOCD IN RCRD: CPT | Mod: CPTII,S$GLB,, | Performed by: OPTOMETRIST

## 2023-08-07 PROCEDURE — 1126F PR PAIN SEVERITY QUANTIFIED, NO PAIN PRESENT: ICD-10-PCS | Mod: CPTII,S$GLB,, | Performed by: OPTOMETRIST

## 2023-08-07 PROCEDURE — 99999 PR PBB SHADOW E&M-EST. PATIENT-LVL III: CPT | Mod: PBBFAC,,, | Performed by: OPTOMETRIST

## 2023-08-07 PROCEDURE — 1101F PR PT FALLS ASSESS DOC 0-1 FALLS W/OUT INJ PAST YR: ICD-10-PCS | Mod: CPTII,S$GLB,, | Performed by: OPTOMETRIST

## 2023-08-07 PROCEDURE — 1160F PR REVIEW ALL MEDS BY PRESCRIBER/CLIN PHARMACIST DOCUMENTED: ICD-10-PCS | Mod: CPTII,S$GLB,, | Performed by: OPTOMETRIST

## 2023-08-07 PROCEDURE — 1101F PT FALLS ASSESS-DOCD LE1/YR: CPT | Mod: CPTII,S$GLB,, | Performed by: OPTOMETRIST

## 2023-08-07 PROCEDURE — 3288F PR FALLS RISK ASSESSMENT DOCUMENTED: ICD-10-PCS | Mod: CPTII,S$GLB,, | Performed by: OPTOMETRIST

## 2023-08-07 PROCEDURE — 92014 PR EYE EXAM, EST PATIENT,COMPREHESV: ICD-10-PCS | Mod: S$GLB,,, | Performed by: OPTOMETRIST

## 2023-08-07 PROCEDURE — 2023F PR DILATED RETINAL EXAM W/O EVID OF RETINOPATHY: ICD-10-PCS | Mod: CPTII,S$GLB,, | Performed by: OPTOMETRIST

## 2023-08-07 PROCEDURE — 1126F AMNT PAIN NOTED NONE PRSNT: CPT | Mod: CPTII,S$GLB,, | Performed by: OPTOMETRIST

## 2023-08-07 NOTE — PROGRESS NOTES
HPI    Pt here for annual DM eye exam DLS- 07/20/22    Pt states his va is stable, wearing OTC specs.   Dm and htn are well controlled on meds.   Denies F/F and GTTS.     Hemoglobin A1C       Date                     Value               Ref Range             Status                04/24/2023               6.6 (H)             4.0 - 5.6 %           Final              Comment:    ADA Screening Guidelines:  5.7-6.4%  Consistent with   prediabetes  >or=6.5%  Consistent with diabetes    High levels of fetal   hemoglobin interfere with the HbA1C  assay. Heterozygous hemoglobin   variants (HbS, HgC, etc)do  not significantly interfere with this assay.     However, presence of multiple variants may affect accuracy.         01/17/2023               9.1 (H)             4.0 - 5.6 %           Final              Comment:    ADA Screening Guidelines:  5.7-6.4%  Consistent with   prediabetes  >or=6.5%  Consistent with diabetes    High levels of fetal   hemoglobin interfere with the HbA1C  assay. Heterozygous hemoglobin   variants (HbS, HgC, etc)do  not significantly interfere with this assay.     However, presence of multiple variants may affect accuracy.         09/12/2022               6.2 (H)             4.0 - 5.6 %           Final              Comment:    ADA Screening Guidelines:  5.7-6.4%  Consistent with   prediabetes  >or=6.5%  Consistent with diabetes    High levels of fetal   hemoglobin interfere with the HbA1C  assay. Heterozygous hemoglobin   variants (HbS, HgC, etc)do  not significantly interfere with this assay.     However, presence of multiple variants may affect accuracy.    ----------   Last edited by Armida Orourke on 8/7/2023  2:47 PM.            Assessment /Plan     For exam results, see Encounter Report.    Type 2 diabetes mellitus without retinopathy    Nuclear sclerosis of right eye    Pseudophakia, left eye      No diabetic retinopathy, no csme. Return in 1 year for dilated eye exam.   2. Educated pt on  presence of cataracts and effects on vision. No surgery at this time. Recheck in one year.   3. Monitor condition. Patient to report any changes. RTC 1 year recheck.

## 2023-08-08 PROBLEM — R26.89 BALANCE PROBLEM: Status: RESOLVED | Noted: 2019-03-22 | Resolved: 2023-08-08

## 2023-08-08 PROBLEM — R26.9 GAIT DIFFICULTY: Status: RESOLVED | Noted: 2023-05-09 | Resolved: 2023-08-08

## 2023-08-28 ENCOUNTER — LAB VISIT (OUTPATIENT)
Dept: LAB | Facility: HOSPITAL | Age: 83
End: 2023-08-28
Attending: FAMILY MEDICINE
Payer: MEDICARE

## 2023-08-28 DIAGNOSIS — E11.9 TYPE 2 DIABETES MELLITUS WITHOUT COMPLICATION, WITHOUT LONG-TERM CURRENT USE OF INSULIN: ICD-10-CM

## 2023-08-28 LAB
ALBUMIN SERPL BCP-MCNC: 4 G/DL (ref 3.5–5.2)
ALP SERPL-CCNC: 51 U/L (ref 55–135)
ALT SERPL W/O P-5'-P-CCNC: 24 U/L (ref 10–44)
ANION GAP SERPL CALC-SCNC: 11 MMOL/L (ref 8–16)
AST SERPL-CCNC: 19 U/L (ref 10–40)
BILIRUB SERPL-MCNC: 0.5 MG/DL (ref 0.1–1)
BUN SERPL-MCNC: 18 MG/DL (ref 8–23)
CALCIUM SERPL-MCNC: 9.8 MG/DL (ref 8.7–10.5)
CHLORIDE SERPL-SCNC: 105 MMOL/L (ref 95–110)
CHOLEST SERPL-MCNC: 179 MG/DL (ref 120–199)
CHOLEST/HDLC SERPL: 4.6 {RATIO} (ref 2–5)
CO2 SERPL-SCNC: 24 MMOL/L (ref 23–29)
CREAT SERPL-MCNC: 1.4 MG/DL (ref 0.5–1.4)
EST. GFR  (NO RACE VARIABLE): 49.6 ML/MIN/1.73 M^2
ESTIMATED AVG GLUCOSE: 140 MG/DL (ref 68–131)
GLUCOSE SERPL-MCNC: 134 MG/DL (ref 70–110)
HBA1C MFR BLD: 6.5 % (ref 4–5.6)
HDLC SERPL-MCNC: 39 MG/DL (ref 40–75)
HDLC SERPL: 21.8 % (ref 20–50)
LDLC SERPL CALC-MCNC: 107.6 MG/DL (ref 63–159)
NONHDLC SERPL-MCNC: 140 MG/DL
POTASSIUM SERPL-SCNC: 4.1 MMOL/L (ref 3.5–5.1)
PROT SERPL-MCNC: 7.1 G/DL (ref 6–8.4)
SODIUM SERPL-SCNC: 140 MMOL/L (ref 136–145)
TRIGL SERPL-MCNC: 162 MG/DL (ref 30–150)

## 2023-08-28 PROCEDURE — 80053 COMPREHEN METABOLIC PANEL: CPT | Performed by: FAMILY MEDICINE

## 2023-08-28 PROCEDURE — 80061 LIPID PANEL: CPT | Performed by: FAMILY MEDICINE

## 2023-08-28 PROCEDURE — 36415 COLL VENOUS BLD VENIPUNCTURE: CPT | Mod: PN | Performed by: FAMILY MEDICINE

## 2023-08-28 PROCEDURE — 83036 HEMOGLOBIN GLYCOSYLATED A1C: CPT | Performed by: FAMILY MEDICINE

## 2023-09-05 DIAGNOSIS — E11.9 TYPE 2 DIABETES MELLITUS WITHOUT COMPLICATION, WITHOUT LONG-TERM CURRENT USE OF INSULIN: ICD-10-CM

## 2023-09-05 RX ORDER — GLIMEPIRIDE 2 MG/1
2 TABLET ORAL
Qty: 90 TABLET | Refills: 0 | Status: SHIPPED | OUTPATIENT
Start: 2023-09-05 | End: 2023-11-15

## 2023-09-11 ENCOUNTER — OFFICE VISIT (OUTPATIENT)
Dept: PRIMARY CARE CLINIC | Facility: CLINIC | Age: 83
End: 2023-09-11
Payer: MEDICARE

## 2023-09-11 VITALS
DIASTOLIC BLOOD PRESSURE: 76 MMHG | BODY MASS INDEX: 23.51 KG/M2 | SYSTOLIC BLOOD PRESSURE: 118 MMHG | RESPIRATION RATE: 18 BRPM | TEMPERATURE: 98 F | HEART RATE: 88 BPM | WEIGHT: 154.63 LBS | OXYGEN SATURATION: 94 %

## 2023-09-11 DIAGNOSIS — R26.89 BALANCE PROBLEM: ICD-10-CM

## 2023-09-11 DIAGNOSIS — R42 DIZZINESS: ICD-10-CM

## 2023-09-11 DIAGNOSIS — E11.42 TYPE 2 DIABETES MELLITUS WITH DIABETIC POLYNEUROPATHY, WITHOUT LONG-TERM CURRENT USE OF INSULIN: Primary | ICD-10-CM

## 2023-09-11 DIAGNOSIS — R29.898 WEAKNESS OF BOTH LOWER EXTREMITIES: ICD-10-CM

## 2023-09-11 PROCEDURE — G0008 FLU VACCINE - QUADRIVALENT - ADJUVANTED: ICD-10-PCS | Mod: S$GLB,,, | Performed by: FAMILY MEDICINE

## 2023-09-11 PROCEDURE — 3078F PR MOST RECENT DIASTOLIC BLOOD PRESSURE < 80 MM HG: ICD-10-PCS | Mod: CPTII,S$GLB,, | Performed by: FAMILY MEDICINE

## 2023-09-11 PROCEDURE — 99214 OFFICE O/P EST MOD 30 MIN: CPT | Mod: 25,S$GLB,, | Performed by: FAMILY MEDICINE

## 2023-09-11 PROCEDURE — 1158F PR ADVANCE CARE PLANNING DISCUSS DOCUMENTED IN MEDICAL RECORD: ICD-10-PCS | Mod: CPTII,S$GLB,, | Performed by: FAMILY MEDICINE

## 2023-09-11 PROCEDURE — 1160F PR REVIEW ALL MEDS BY PRESCRIBER/CLIN PHARMACIST DOCUMENTED: ICD-10-PCS | Mod: CPTII,S$GLB,, | Performed by: FAMILY MEDICINE

## 2023-09-11 PROCEDURE — 3074F SYST BP LT 130 MM HG: CPT | Mod: CPTII,S$GLB,, | Performed by: FAMILY MEDICINE

## 2023-09-11 PROCEDURE — 1126F PR PAIN SEVERITY QUANTIFIED, NO PAIN PRESENT: ICD-10-PCS | Mod: CPTII,S$GLB,, | Performed by: FAMILY MEDICINE

## 2023-09-11 PROCEDURE — 99214 PR OFFICE/OUTPT VISIT, EST, LEVL IV, 30-39 MIN: ICD-10-PCS | Mod: 25,S$GLB,, | Performed by: FAMILY MEDICINE

## 2023-09-11 PROCEDURE — 90694 VACC AIIV4 NO PRSRV 0.5ML IM: CPT | Mod: S$GLB,,, | Performed by: FAMILY MEDICINE

## 2023-09-11 PROCEDURE — 1159F PR MEDICATION LIST DOCUMENTED IN MEDICAL RECORD: ICD-10-PCS | Mod: CPTII,S$GLB,, | Performed by: FAMILY MEDICINE

## 2023-09-11 PROCEDURE — 1159F MED LIST DOCD IN RCRD: CPT | Mod: CPTII,S$GLB,, | Performed by: FAMILY MEDICINE

## 2023-09-11 PROCEDURE — 1101F PR PT FALLS ASSESS DOC 0-1 FALLS W/OUT INJ PAST YR: ICD-10-PCS | Mod: CPTII,S$GLB,, | Performed by: FAMILY MEDICINE

## 2023-09-11 PROCEDURE — 1126F AMNT PAIN NOTED NONE PRSNT: CPT | Mod: CPTII,S$GLB,, | Performed by: FAMILY MEDICINE

## 2023-09-11 PROCEDURE — 1158F ADVNC CARE PLAN TLK DOCD: CPT | Mod: CPTII,S$GLB,, | Performed by: FAMILY MEDICINE

## 2023-09-11 PROCEDURE — 3074F PR MOST RECENT SYSTOLIC BLOOD PRESSURE < 130 MM HG: ICD-10-PCS | Mod: CPTII,S$GLB,, | Performed by: FAMILY MEDICINE

## 2023-09-11 PROCEDURE — 1101F PT FALLS ASSESS-DOCD LE1/YR: CPT | Mod: CPTII,S$GLB,, | Performed by: FAMILY MEDICINE

## 2023-09-11 PROCEDURE — 3288F PR FALLS RISK ASSESSMENT DOCUMENTED: ICD-10-PCS | Mod: CPTII,S$GLB,, | Performed by: FAMILY MEDICINE

## 2023-09-11 PROCEDURE — 1160F RVW MEDS BY RX/DR IN RCRD: CPT | Mod: CPTII,S$GLB,, | Performed by: FAMILY MEDICINE

## 2023-09-11 PROCEDURE — G0008 ADMIN INFLUENZA VIRUS VAC: HCPCS | Mod: S$GLB,,, | Performed by: FAMILY MEDICINE

## 2023-09-11 PROCEDURE — 99999 PR PBB SHADOW E&M-EST. PATIENT-LVL V: ICD-10-PCS | Mod: PBBFAC,,, | Performed by: FAMILY MEDICINE

## 2023-09-11 PROCEDURE — 90694 FLU VACCINE - QUADRIVALENT - ADJUVANTED: ICD-10-PCS | Mod: S$GLB,,, | Performed by: FAMILY MEDICINE

## 2023-09-11 PROCEDURE — 99999 PR PBB SHADOW E&M-EST. PATIENT-LVL V: CPT | Mod: PBBFAC,,, | Performed by: FAMILY MEDICINE

## 2023-09-11 PROCEDURE — 3288F FALL RISK ASSESSMENT DOCD: CPT | Mod: CPTII,S$GLB,, | Performed by: FAMILY MEDICINE

## 2023-09-11 PROCEDURE — 3078F DIAST BP <80 MM HG: CPT | Mod: CPTII,S$GLB,, | Performed by: FAMILY MEDICINE

## 2023-09-11 NOTE — PROGRESS NOTES
THIS DOCUMENT WAS MADE IN PART WITH VOICE RECOGNITION SOFTWARE.  OCCASIONALLY THIS SOFTWARE WILL MISINTERPRET WORDS OR PHRASES.      [facility] Provider App[facility]ment   [facility]       Patient ID: Cecilio Cespedes is a 83 y.o. male.    ASSESSMENT/PLAN by Problem List:    1. Type 2 diabetes mellitus with diabetic polyneuropathy, without long-term current use of insulin  Assessment & Plan:  Remains very good, will stop glimepiride       Orders:  -     Comprehensive Metabolic Panel; Future; Expected date: 09/11/2023  -     Hemoglobin A1C; Future; Expected date: 09/11/2023    2. Dizziness  -     Ambulatory referral/consult to Physical/Occupational Therapy; Future; Expected date: 09/18/2023    3. Balance problem  -     Ambulatory referral/consult to Physical/Occupational Therapy; Future; Expected date: 09/18/2023    4. Weakness of both lower extremities  -     Ambulatory referral/consult to Physical/Occupational Therapy; Future; Expected date: 09/18/2023    Other orders  -     Influenza (FLUAD) - Quadrivalent (Adjuvanted) *Preferred* (65+) (PF)       No acute neurological changes to explain his dizziness.  Recommend some PT and balance.  He states he will try to do a better job of complying this time.  If not improving he may wish to see ENT.  Fall precautions reviewed    Follow Up:  Three months    Advance Care Planning     Date: 09/11/2023    Living Will  Power of   I initiated the process of voluntary advance care planning today and explained the importance of this process to the patient.  I introduced the concept of advance directives to the patient, as well. Then the patient received detailed information about the importance of designating a Health Care Power of  (HCPOA). He was also instructed to communicate with this person about their wishes for future healthcare, should he become sick and lose decision-making capacity. The patient has previously appointed a  HCPOA. After our discussion, the patient has decided to complete a HCPOA and has appointed his significant other, health care agent:  wife      Has completed, I advise to review and send us copies.                Subjective:     Chief Complaint   Patient presents with    Follow-up    Dizziness     Pt states he's been feeling dizzy for 2wks.     I have reviewed the information entered by the ancillary staff regarding the chief complaint as well as the related history.    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    Routine follow-up.  He complains of dizziness.  Often on for about a month.  Denies any falls or accidents.  It can happen when he is lying down or standing.    Diabetes remains good.  No hypoglycemia.    Continues to remain fairly sedentary with lack of motivation.  But in the past he has not follow with physical therapy and declined to come for counseling with our social      For complete problem list, past medical history, surgical history, social history, etc., see appropriate section in the electronic medical record    Review of Systems   Constitutional:  Negative for activity change and unexpected weight change.   HENT:  Negative for hearing loss, rhinorrhea and trouble swallowing.    Eyes:  Negative for discharge and visual disturbance.   Respiratory:  Negative for chest tightness and wheezing.    Cardiovascular:  Negative for chest pain and palpitations.   Gastrointestinal:  Positive for constipation and diarrhea. Negative for blood in stool and vomiting.   Endocrine: Negative for polydipsia and polyuria.   Genitourinary:  Negative for difficulty urinating, hematuria and urgency.   Musculoskeletal:  Negative for arthralgias, joint swelling and neck pain.   Neurological:  Negative for weakness and headaches.   Psychiatric/Behavioral:  Positive for dysphoric mood. Negative for confusion and suicidal ideas.        Objective     Physical Exam  Vitals reviewed.   Constitutional:       General: He is  not in acute distress.     Appearance: He is well-developed. He is not diaphoretic.   HENT:      Head: Normocephalic and atraumatic.      Right Ear: Tympanic membrane, ear canal and external ear normal. There is no impacted cerumen.      Left Ear: Tympanic membrane, ear canal and external ear normal. There is no impacted cerumen.      Nose: No congestion.      Mouth/Throat:      Pharynx: No oropharyngeal exudate.   Eyes:      General: No scleral icterus.     Conjunctiva/sclera: Conjunctivae normal.   Cardiovascular:      Rate and Rhythm: Normal rate and regular rhythm.      Heart sounds: Murmur heard.   Pulmonary:      Effort: Pulmonary effort is normal. No respiratory distress.      Breath sounds: No wheezing.   Musculoskeletal:      Cervical back: Normal range of motion and neck supple.   Skin:     General: Skin is warm and dry.   Neurological:      Mental Status: He is alert.      Deep Tendon Reflexes: Reflexes are normal and symmetric.      Comments: He was brought back in a wheelchair today  Subtle tremor with intention  Muscle strength appears to be normal and symmetrical bilaterally.  Finger-to-nose is slow but otherwise normal  No nystagmus.  Rapid alternating movements were normal.   Psychiatric:         Behavior: Behavior normal.       Vitals:    09/11/23 1028   BP: 118/76   BP Location: Right arm   Patient Position: Sitting   BP Method: Medium (Manual)   Pulse: 88   Resp: 18   Temp: 97.7 °F (36.5 °C)   TempSrc: Oral   SpO2: (!) 94%   Weight: 70.1 kg (154 lb 10.4 oz)

## 2023-09-12 ENCOUNTER — TELEPHONE (OUTPATIENT)
Dept: PRIMARY CARE CLINIC | Facility: CLINIC | Age: 83
End: 2023-09-12
Payer: MEDICARE

## 2023-09-12 NOTE — TELEPHONE ENCOUNTER
Called patient and spoke to wife, Karrie, who said patient refuses to have AWV. I advised Karrie to call back if patient changes his mind, and she agreed.

## 2023-09-27 ENCOUNTER — TELEPHONE (OUTPATIENT)
Dept: PRIMARY CARE CLINIC | Facility: CLINIC | Age: 83
End: 2023-09-27
Payer: MEDICARE

## 2023-09-27 DIAGNOSIS — R30.0 DYSURIA: Primary | ICD-10-CM

## 2023-09-27 NOTE — TELEPHONE ENCOUNTER
Spoke with pt's wife, she reports pain around waist area, rubbing heating cream on it without relief.  Urinating a lot, taking Myrbetriq for past 4 months, wife reports that it is not working and that she stopped Myrbetriq.  Wasn't hurting as bad last night, only with movement, wife believes pain is muscular in nature, and is going to give him some tylenol.  Encouraged wife to follow up with urology.  Reports that she is going to make an appt again with Dr. Murphy.  Dr. Cowart ordered urine panel and pt's wife will  specimen cup today.

## 2023-09-27 NOTE — TELEPHONE ENCOUNTER
----- Message from Odessa Fernandez sent at 9/26/2023  3:05 PM CDT -----  Contact: Wife  Type:  Sooner Appointment Request    Caller is requesting a sooner appointment.  Caller declined first available appointment listed below.  Caller will not accept being placed on the waitlist and is requesting a message be sent to doctor.    Name of Caller:  Wife   When is the first available appointment?  NA   Symptoms:  Rt side pain over a week   Best Call Back Number:  [phone]    Additional Information:  Please call would like to be seen tomorrow.

## 2023-09-28 ENCOUNTER — LAB VISIT (OUTPATIENT)
Dept: LAB | Facility: HOSPITAL | Age: 83
End: 2023-09-28
Attending: FAMILY MEDICINE
Payer: MEDICARE

## 2023-09-28 DIAGNOSIS — R30.0 DYSURIA: ICD-10-CM

## 2023-09-28 LAB
BACTERIA #/AREA URNS AUTO: ABNORMAL /HPF
BACTERIA #/AREA URNS AUTO: NORMAL /HPF
BILIRUB UR QL STRIP: NEGATIVE
CLARITY UR REFRACT.AUTO: CLEAR
COLOR UR AUTO: YELLOW
GLUCOSE UR QL STRIP: ABNORMAL
HGB UR QL STRIP: NEGATIVE
KETONES UR QL STRIP: NEGATIVE
LEUKOCYTE ESTERASE UR QL STRIP: NEGATIVE
MICROSCOPIC COMMENT: ABNORMAL
MICROSCOPIC COMMENT: NORMAL
NITRITE UR QL STRIP: NEGATIVE
PH UR STRIP: 6 [PH] (ref 5–8)
PROT UR QL STRIP: NEGATIVE
RBC #/AREA URNS AUTO: 0 /HPF (ref 0–4)
RBC #/AREA URNS AUTO: 0 /HPF (ref 0–4)
SP GR UR STRIP: 1.02 (ref 1–1.03)
SQUAMOUS #/AREA URNS AUTO: 2 /HPF
SQUAMOUS #/AREA URNS AUTO: 2 /HPF
URN SPEC COLLECT METH UR: ABNORMAL
WBC #/AREA URNS AUTO: 5 /HPF (ref 0–5)
WBC #/AREA URNS AUTO: 6 /HPF (ref 0–5)
YEAST UR QL AUTO: ABNORMAL

## 2023-09-28 PROCEDURE — 87088 URINE BACTERIA CULTURE: CPT | Performed by: FAMILY MEDICINE

## 2023-09-28 PROCEDURE — 87186 SC STD MICRODIL/AGAR DIL: CPT | Performed by: FAMILY MEDICINE

## 2023-09-28 PROCEDURE — 81001 URINALYSIS AUTO W/SCOPE: CPT | Performed by: FAMILY MEDICINE

## 2023-09-28 PROCEDURE — 87077 CULTURE AEROBIC IDENTIFY: CPT | Performed by: FAMILY MEDICINE

## 2023-09-28 PROCEDURE — 87086 URINE CULTURE/COLONY COUNT: CPT | Performed by: FAMILY MEDICINE

## 2023-09-29 ENCOUNTER — TELEPHONE (OUTPATIENT)
Dept: PRIMARY CARE CLINIC | Facility: CLINIC | Age: 83
End: 2023-09-29
Payer: MEDICARE

## 2023-09-29 RX ORDER — AMOXICILLIN 875 MG/1
875 TABLET, FILM COATED ORAL 2 TIMES DAILY
Qty: 20 TABLET | Refills: 0 | Status: SHIPPED | OUTPATIENT
Start: 2023-09-29 | End: 2023-10-09

## 2023-09-29 NOTE — TELEPHONE ENCOUNTER
Spoke with pt's wife, discussed your message, she verbalized understanding and will start ABX tonight.

## 2023-09-30 LAB
BACTERIA UR CULT: ABNORMAL
BACTERIA UR CULT: ABNORMAL

## 2023-10-19 RX ORDER — LANCETS 28 GAUGE
EACH MISCELLANEOUS
Qty: 200 EACH | Status: SHIPPED | OUTPATIENT
Start: 2023-10-19

## 2023-11-15 ENCOUNTER — OFFICE VISIT (OUTPATIENT)
Dept: UROLOGY | Facility: CLINIC | Age: 83
End: 2023-11-15
Payer: MEDICARE

## 2023-11-15 VITALS — HEIGHT: 68 IN | BODY MASS INDEX: 23.43 KG/M2 | WEIGHT: 154.56 LBS

## 2023-11-15 DIAGNOSIS — R10.9 FLANK PAIN: ICD-10-CM

## 2023-11-15 DIAGNOSIS — N32.81 OVERACTIVE BLADDER: Primary | ICD-10-CM

## 2023-11-15 LAB
BILIRUBIN, UA POC OHS: NEGATIVE
BLOOD, UA POC OHS: ABNORMAL
CLARITY, UA POC OHS: CLEAR
COLOR, UA POC OHS: YELLOW
GLUCOSE, UA POC OHS: >=1000
KETONES, UA POC OHS: NEGATIVE
LEUKOCYTES, UA POC OHS: NEGATIVE
NITRITE, UA POC OHS: NEGATIVE
PH, UA POC OHS: 5.5
PROTEIN, UA POC OHS: NEGATIVE
SPECIFIC GRAVITY, UA POC OHS: 1.01
UROBILINOGEN, UA POC OHS: 1

## 2023-11-15 PROCEDURE — 81003 POCT URINALYSIS(INSTRUMENT): ICD-10-PCS | Mod: QW,S$GLB,, | Performed by: UROLOGY

## 2023-11-15 PROCEDURE — 3288F FALL RISK ASSESSMENT DOCD: CPT | Mod: CPTII,S$GLB,, | Performed by: UROLOGY

## 2023-11-15 PROCEDURE — 1101F PT FALLS ASSESS-DOCD LE1/YR: CPT | Mod: CPTII,S$GLB,, | Performed by: UROLOGY

## 2023-11-15 PROCEDURE — 99214 PR OFFICE/OUTPT VISIT, EST, LEVL IV, 30-39 MIN: ICD-10-PCS | Mod: S$GLB,,, | Performed by: UROLOGY

## 2023-11-15 PROCEDURE — 1101F PR PT FALLS ASSESS DOC 0-1 FALLS W/OUT INJ PAST YR: ICD-10-PCS | Mod: CPTII,S$GLB,, | Performed by: UROLOGY

## 2023-11-15 PROCEDURE — 99214 OFFICE O/P EST MOD 30 MIN: CPT | Mod: S$GLB,,, | Performed by: UROLOGY

## 2023-11-15 PROCEDURE — 1159F PR MEDICATION LIST DOCUMENTED IN MEDICAL RECORD: ICD-10-PCS | Mod: CPTII,S$GLB,, | Performed by: UROLOGY

## 2023-11-15 PROCEDURE — 3288F PR FALLS RISK ASSESSMENT DOCUMENTED: ICD-10-PCS | Mod: CPTII,S$GLB,, | Performed by: UROLOGY

## 2023-11-15 PROCEDURE — 99999 PR PBB SHADOW E&M-EST. PATIENT-LVL III: CPT | Mod: PBBFAC,,, | Performed by: UROLOGY

## 2023-11-15 PROCEDURE — 99999 PR PBB SHADOW E&M-EST. PATIENT-LVL III: ICD-10-PCS | Mod: PBBFAC,,, | Performed by: UROLOGY

## 2023-11-15 PROCEDURE — 81003 URINALYSIS AUTO W/O SCOPE: CPT | Mod: QW,S$GLB,, | Performed by: UROLOGY

## 2023-11-15 PROCEDURE — 1159F MED LIST DOCD IN RCRD: CPT | Mod: CPTII,S$GLB,, | Performed by: UROLOGY

## 2023-11-15 RX ORDER — SOLIFENACIN SUCCINATE 10 MG/1
10 TABLET, FILM COATED ORAL DAILY
Qty: 30 TABLET | Refills: 11 | Status: SHIPPED | OUTPATIENT
Start: 2023-11-15

## 2023-11-15 NOTE — PROGRESS NOTES
Subjective:       Patient ID: Cecilio Cespedes is a 83 y.o. male.    Chief Complaint: Follow-up, Urinary Frequency, and Flank Pain (Right side)    HPI    83-year-old with urinary incontinence beginning.  He is seen today with his wife.  He is had symptoms of urinary frequency and urgency and nocturia for a while but have gotten more severe.  He was seen 6 months ago was given a trial of Myrbetriq but he does not feel that there has been any significant improvement in his symptoms.  He also has diabetes.  He denies hematuria and dysuria.  His urinalysis is clear today except for glucose.  He is not tried any previous BPH medications.  He is had no previous prostate surgery.  He also complains of right flank pain which is a problem for the last several months.  The pain is positional and notices it when he is getting out of bed and other positional movements.  Urine dipstick shows negative for all components except glucose    Review of Systems   Constitutional:  Negative for fever.   Genitourinary:  Negative for dysuria and hematuria.       Objective:      Physical Exam  Vitals reviewed.   Constitutional:       Appearance: He is well-developed.   Pulmonary:      Effort: Pulmonary effort is normal.   Abdominal:      Tenderness: There is no right CVA tenderness or left CVA tenderness.   Skin:     Findings: No rash.   Neurological:      Mental Status: He is alert and oriented to person, place, and time.         Assessment:       1. Overactive bladder    2. Flank pain        Plan:       Overactive bladder  -     POCT Urinalysis(Instrument)    Flank pain    Other orders  -     solifenacin (VESICARE) 10 MG tablet; Take 1 tablet (10 mg total) by mouth once daily.  Dispense: 30 tablet; Refill: 11      Pain is most likely musculoskeletal in origin.  Hold Myrbetriq for now.  Trial VESIcare.

## 2023-12-13 ENCOUNTER — LAB VISIT (OUTPATIENT)
Dept: LAB | Facility: HOSPITAL | Age: 83
End: 2023-12-13
Attending: FAMILY MEDICINE
Payer: MEDICARE

## 2023-12-13 DIAGNOSIS — E11.42 TYPE 2 DIABETES MELLITUS WITH DIABETIC POLYNEUROPATHY, WITHOUT LONG-TERM CURRENT USE OF INSULIN: ICD-10-CM

## 2023-12-13 LAB
ALBUMIN SERPL BCP-MCNC: 4.2 G/DL (ref 3.5–5.2)
ALP SERPL-CCNC: 62 U/L (ref 55–135)
ALT SERPL W/O P-5'-P-CCNC: 21 U/L (ref 10–44)
ANION GAP SERPL CALC-SCNC: 11 MMOL/L (ref 8–16)
AST SERPL-CCNC: 21 U/L (ref 10–40)
BILIRUB SERPL-MCNC: 0.7 MG/DL (ref 0.1–1)
BUN SERPL-MCNC: 17 MG/DL (ref 8–23)
CALCIUM SERPL-MCNC: 9.8 MG/DL (ref 8.7–10.5)
CHLORIDE SERPL-SCNC: 106 MMOL/L (ref 95–110)
CO2 SERPL-SCNC: 24 MMOL/L (ref 23–29)
CREAT SERPL-MCNC: 1.5 MG/DL (ref 0.5–1.4)
EST. GFR  (NO RACE VARIABLE): 45.6 ML/MIN/1.73 M^2
ESTIMATED AVG GLUCOSE: 186 MG/DL (ref 68–131)
GLUCOSE SERPL-MCNC: 193 MG/DL (ref 70–110)
HBA1C MFR BLD: 8.1 % (ref 4–5.6)
POTASSIUM SERPL-SCNC: 3.6 MMOL/L (ref 3.5–5.1)
PROT SERPL-MCNC: 7.5 G/DL (ref 6–8.4)
SODIUM SERPL-SCNC: 141 MMOL/L (ref 136–145)

## 2023-12-13 PROCEDURE — 36415 COLL VENOUS BLD VENIPUNCTURE: CPT | Mod: PN | Performed by: FAMILY MEDICINE

## 2023-12-13 PROCEDURE — 83036 HEMOGLOBIN GLYCOSYLATED A1C: CPT | Performed by: FAMILY MEDICINE

## 2023-12-13 PROCEDURE — 80053 COMPREHEN METABOLIC PANEL: CPT | Performed by: FAMILY MEDICINE

## 2023-12-20 ENCOUNTER — OFFICE VISIT (OUTPATIENT)
Dept: PRIMARY CARE CLINIC | Facility: CLINIC | Age: 83
End: 2023-12-20
Payer: MEDICARE

## 2023-12-20 VITALS
HEIGHT: 68 IN | OXYGEN SATURATION: 100 % | DIASTOLIC BLOOD PRESSURE: 76 MMHG | SYSTOLIC BLOOD PRESSURE: 108 MMHG | RESPIRATION RATE: 18 BRPM | BODY MASS INDEX: 22.4 KG/M2 | WEIGHT: 147.81 LBS | HEART RATE: 64 BPM

## 2023-12-20 DIAGNOSIS — R29.6 MULTIPLE FALLS: ICD-10-CM

## 2023-12-20 DIAGNOSIS — R26.89 BALANCE PROBLEM: ICD-10-CM

## 2023-12-20 DIAGNOSIS — E11.42 TYPE 2 DIABETES MELLITUS WITH DIABETIC POLYNEUROPATHY, WITHOUT LONG-TERM CURRENT USE OF INSULIN: Primary | ICD-10-CM

## 2023-12-20 PROCEDURE — 3288F PR FALLS RISK ASSESSMENT DOCUMENTED: ICD-10-PCS | Mod: CPTII,S$GLB,, | Performed by: FAMILY MEDICINE

## 2023-12-20 PROCEDURE — 99999 PR PBB SHADOW E&M-EST. PATIENT-LVL V: ICD-10-PCS | Mod: PBBFAC,,, | Performed by: FAMILY MEDICINE

## 2023-12-20 PROCEDURE — 3078F PR MOST RECENT DIASTOLIC BLOOD PRESSURE < 80 MM HG: ICD-10-PCS | Mod: CPTII,S$GLB,, | Performed by: FAMILY MEDICINE

## 2023-12-20 PROCEDURE — 1101F PR PT FALLS ASSESS DOC 0-1 FALLS W/OUT INJ PAST YR: ICD-10-PCS | Mod: CPTII,S$GLB,, | Performed by: FAMILY MEDICINE

## 2023-12-20 PROCEDURE — 1159F PR MEDICATION LIST DOCUMENTED IN MEDICAL RECORD: ICD-10-PCS | Mod: CPTII,S$GLB,, | Performed by: FAMILY MEDICINE

## 2023-12-20 PROCEDURE — 99215 OFFICE O/P EST HI 40 MIN: CPT | Mod: S$GLB,,, | Performed by: FAMILY MEDICINE

## 2023-12-20 PROCEDURE — 3074F SYST BP LT 130 MM HG: CPT | Mod: CPTII,S$GLB,, | Performed by: FAMILY MEDICINE

## 2023-12-20 PROCEDURE — 1160F RVW MEDS BY RX/DR IN RCRD: CPT | Mod: CPTII,S$GLB,, | Performed by: FAMILY MEDICINE

## 2023-12-20 PROCEDURE — 3074F PR MOST RECENT SYSTOLIC BLOOD PRESSURE < 130 MM HG: ICD-10-PCS | Mod: CPTII,S$GLB,, | Performed by: FAMILY MEDICINE

## 2023-12-20 PROCEDURE — 1126F AMNT PAIN NOTED NONE PRSNT: CPT | Mod: CPTII,S$GLB,, | Performed by: FAMILY MEDICINE

## 2023-12-20 PROCEDURE — 1160F PR REVIEW ALL MEDS BY PRESCRIBER/CLIN PHARMACIST DOCUMENTED: ICD-10-PCS | Mod: CPTII,S$GLB,, | Performed by: FAMILY MEDICINE

## 2023-12-20 PROCEDURE — 99999 PR PBB SHADOW E&M-EST. PATIENT-LVL V: CPT | Mod: PBBFAC,,, | Performed by: FAMILY MEDICINE

## 2023-12-20 PROCEDURE — 1126F PR PAIN SEVERITY QUANTIFIED, NO PAIN PRESENT: ICD-10-PCS | Mod: CPTII,S$GLB,, | Performed by: FAMILY MEDICINE

## 2023-12-20 PROCEDURE — 1101F PT FALLS ASSESS-DOCD LE1/YR: CPT | Mod: CPTII,S$GLB,, | Performed by: FAMILY MEDICINE

## 2023-12-20 PROCEDURE — 99215 PR OFFICE/OUTPT VISIT, EST, LEVL V, 40-54 MIN: ICD-10-PCS | Mod: S$GLB,,, | Performed by: FAMILY MEDICINE

## 2023-12-20 PROCEDURE — 3288F FALL RISK ASSESSMENT DOCD: CPT | Mod: CPTII,S$GLB,, | Performed by: FAMILY MEDICINE

## 2023-12-20 PROCEDURE — 3078F DIAST BP <80 MM HG: CPT | Mod: CPTII,S$GLB,, | Performed by: FAMILY MEDICINE

## 2023-12-20 PROCEDURE — 1159F MED LIST DOCD IN RCRD: CPT | Mod: CPTII,S$GLB,, | Performed by: FAMILY MEDICINE

## 2023-12-20 RX ORDER — GLIPIZIDE 5 MG/1
TABLET ORAL
Qty: 30 TABLET | Refills: 3 | Status: SHIPPED | OUTPATIENT
Start: 2023-12-20 | End: 2024-03-20

## 2023-12-20 NOTE — ASSESSMENT & PLAN NOTE
Significant increase in hemoglobin A1c from 6.5%, now 8.1%.  I did recently discontinue glimepiride 1 mg daily because of concerns and risk of hypoglycemia in the elderly.  I do not think this fully accounts for the increase in A1c.  His wife does admit there is room for dietary improvement.  He has been experiencing some weight loss and polyuria likely as a result of the elevated glucose.  He remains on Jardiance.  They are not ready to consider injectables whether that be insulin or other.  Given age and creatinine of 1.5 I am hesitant to resume metformin.  He had been on this before but at one point had a significant increase in creatinine and this was stopped.  And while recent creatinine levels have been better he is still on the borderline and I do not feel this would be a long-term option.  Will begin glipizide 5 mg just once a day with his larger meal in the middle of the day.  This is a short-acting sulfonylurea and his wife will monitor for hypoglycemia, will only administer with his larger mid day meal.  If there is any suggestion of this then will have to look at placing him on basal insulin but I do not know that this is an option they are willing to consider just yet.

## 2023-12-20 NOTE — PROGRESS NOTES
Primary Care Provider Appointment   [facility]       Patient ID: Cecilio Cespedes is a 83 y.o. male.    ASSESSMENT/PLAN by Problem List:    1. Type 2 diabetes mellitus with diabetic polyneuropathy, without long-term current use of insulin  Assessment & Plan:  Significant increase in hemoglobin A1c from 6.5%, now 8.1%.  I did recently discontinue glimepiride 1 mg daily because of concerns and risk of hypoglycemia in the elderly.  I do not think this fully accounts for the increase in A1c.  His wife does admit there is room for dietary improvement.  He has been experiencing some weight loss and polyuria likely as a result of the elevated glucose.  He remains on Jardiance.  They are not ready to consider injectables whether that be insulin or other.  Given age and creatinine of 1.5 I am hesitant to resume metformin.  He had been on this before but at one point had a significant increase in creatinine and this was stopped.  And while recent creatinine levels have been better he is still on the borderline and I do not feel this would be a long-term option.  Will begin glipizide 5 mg just once a day with his larger meal in the middle of the day.  This is a short-acting sulfonylurea and his wife will monitor for hypoglycemia, will only administer with his larger mid day meal.  If there is any suggestion of this then will have to look at placing him on basal insulin but I do not know that this is an option they are willing to consider just yet.    Orders:  -     Comprehensive Metabolic Panel; Future; Expected date: 12/20/2023  -     Hemoglobin A1C; Future; Expected date: 12/20/2023    2. Multiple falls  -     Ambulatory referral/consult to Physical/Occupational Therapy; Future; Expected date: 12/27/2023    3. Balance problem  -     Ambulatory referral/consult to Physical/Occupational Therapy; Future; Expected date: 12/27/2023    Other orders  -     glipiZIDE (GLUCOTROL) 5 MG tablet;  Take 1 tablet by mouth once daily with mid-day meal.  Dispense: 30 tablet; Refill: 3     Multiple falls, all mechanical.  Strongly recommended physical therapy again.  He and his wife declined strongly recommended then considering home health they declined.  Encouraged him to try to become more active, uses walker more regularly, regular light exercise.  His wife states she will try but he typically remains on motivated and does not cooperate.    Follow Up:  Three months    Fifty minutes of total time spent on the encounter, time includes face to face time, and some or all of the following: review of chart, lab, imaging, consultant notes, ER, hospital, documentation, care coordination, etc.    Health Maintenance         Date Due Completion Date    Shingles Vaccine (2 of 3) 09/09/2016 7/15/2016    Diabetes Urine Screening 01/17/2024 1/17/2023    Hemoglobin A1c 03/13/2024 12/13/2023    TETANUS VACCINE 06/12/2024 6/12/2014 (Done)    Override on 6/12/2014: Done (Completed at [facility] to [facility] for cut - dsn't remember specific date)    Eye Exam 08/07/2024 8/7/2023    Override on 11/17/2016: Done ([facility])    Override on 11/3/2015: Done    Lipid Panel 08/28/2024 8/28/2023              Subjective:     Chief Complaint   Patient presents with    Follow-up     I have reviewed the information entered by the ancillary staff regarding the chief complaint as well as the related history.    HPI    Patient is a/an 83 y.o.  male     Diabetes worsening.  I did recently discontinue glimepiride.  But dietary concerns appear to be affecting this as well.    Fall today, one a few weeks ago, no injury  Discussed in detail, offered PT again, Home health offered, again both declined    For complete problem list, past medical history, surgical history, social history, etc., see appropriate section in the electronic medical record    Review of Systems   Constitutional:  Positive for unexpected weight change. Negative for activity  "change.   HENT:  Negative for hearing loss and trouble swallowing.    Eyes:  Negative for discharge.   Respiratory:  Negative for chest tightness and wheezing.    Cardiovascular:  Negative for chest pain and palpitations.   Gastrointestinal:  Positive for constipation. Negative for diarrhea and vomiting.   Endocrine: Positive for polydipsia.   Genitourinary:  Negative for difficulty urinating and hematuria.   Neurological:  Negative for headaches.   Psychiatric/Behavioral:  Positive for dysphoric mood.        Objective     Physical Exam  Vitals reviewed.   Constitutional:       General: He is not in acute distress.     Appearance: He is well-developed. He is not diaphoretic.   HENT:      Head: Normocephalic and atraumatic.   Eyes:      General: No scleral icterus.     Conjunctiva/sclera: Conjunctivae normal.   Cardiovascular:      Rate and Rhythm: Normal rate and regular rhythm.      Heart sounds: Murmur heard.   Pulmonary:      Effort: Pulmonary effort is normal. No respiratory distress.   Musculoskeletal:      Cervical back: Normal range of motion and neck supple.   Skin:     General: Skin is warm and dry.   Neurological:      Mental Status: He is alert.      Deep Tendon Reflexes: Reflexes are normal and symmetric.      Comments: Wheelchair used for long walk back to the exam room   Psychiatric:         Behavior: Behavior normal.       Vitals:    12/20/23 1032   BP: 108/76   BP Location: Left arm   Patient Position: Sitting   BP Method: Medium (Manual)   Pulse: 64   Resp: 18   SpO2: 100%   Weight: 67 kg (147 lb 13.1 oz)   Height: 5' 8" (1.727 m)           THIS DOCUMENT WAS MADE IN PART WITH VOICE RECOGNITION SOFTWARE.  OCCASIONALLY THIS SOFTWARE WILL MISINTERPRET WORDS OR PHRASES.    "

## 2024-01-17 RX ORDER — ESCITALOPRAM OXALATE 10 MG/1
10 TABLET ORAL DAILY
Qty: 90 TABLET | Refills: 3 | Status: SHIPPED | OUTPATIENT
Start: 2024-01-17 | End: 2025-01-16

## 2024-02-06 DIAGNOSIS — E78.5 DYSLIPIDEMIA: ICD-10-CM

## 2024-02-06 RX ORDER — PRAVASTATIN SODIUM 20 MG/1
20 TABLET ORAL DAILY
Qty: 90 TABLET | Refills: 3 | Status: SHIPPED | OUTPATIENT
Start: 2024-02-06

## 2024-03-13 ENCOUNTER — LAB VISIT (OUTPATIENT)
Dept: LAB | Facility: HOSPITAL | Age: 84
End: 2024-03-13
Attending: FAMILY MEDICINE
Payer: MEDICARE

## 2024-03-13 DIAGNOSIS — E11.42 TYPE 2 DIABETES MELLITUS WITH DIABETIC POLYNEUROPATHY, WITHOUT LONG-TERM CURRENT USE OF INSULIN: ICD-10-CM

## 2024-03-13 LAB
ALBUMIN SERPL BCP-MCNC: 3.8 G/DL (ref 3.5–5.2)
ALP SERPL-CCNC: 57 U/L (ref 55–135)
ALT SERPL W/O P-5'-P-CCNC: 19 U/L (ref 10–44)
ANION GAP SERPL CALC-SCNC: 9 MMOL/L (ref 8–16)
AST SERPL-CCNC: 16 U/L (ref 10–40)
BILIRUB SERPL-MCNC: 0.5 MG/DL (ref 0.1–1)
BUN SERPL-MCNC: 26 MG/DL (ref 8–23)
CALCIUM SERPL-MCNC: 9.3 MG/DL (ref 8.7–10.5)
CHLORIDE SERPL-SCNC: 108 MMOL/L (ref 95–110)
CO2 SERPL-SCNC: 22 MMOL/L (ref 23–29)
CREAT SERPL-MCNC: 1.4 MG/DL (ref 0.5–1.4)
EST. GFR  (NO RACE VARIABLE): 49.3 ML/MIN/1.73 M^2
ESTIMATED AVG GLUCOSE: 151 MG/DL (ref 68–131)
GLUCOSE SERPL-MCNC: 192 MG/DL (ref 70–110)
HBA1C MFR BLD: 6.9 % (ref 4–5.6)
POTASSIUM SERPL-SCNC: 4.2 MMOL/L (ref 3.5–5.1)
PROT SERPL-MCNC: 6.9 G/DL (ref 6–8.4)
SODIUM SERPL-SCNC: 139 MMOL/L (ref 136–145)

## 2024-03-13 PROCEDURE — 83036 HEMOGLOBIN GLYCOSYLATED A1C: CPT | Performed by: FAMILY MEDICINE

## 2024-03-13 PROCEDURE — 80053 COMPREHEN METABOLIC PANEL: CPT | Performed by: FAMILY MEDICINE

## 2024-03-13 PROCEDURE — 36415 COLL VENOUS BLD VENIPUNCTURE: CPT | Mod: PN | Performed by: FAMILY MEDICINE

## 2024-03-20 ENCOUNTER — OFFICE VISIT (OUTPATIENT)
Dept: PRIMARY CARE CLINIC | Facility: CLINIC | Age: 84
End: 2024-03-20
Payer: MEDICARE

## 2024-03-20 VITALS
OXYGEN SATURATION: 98 % | RESPIRATION RATE: 17 BRPM | HEART RATE: 74 BPM | DIASTOLIC BLOOD PRESSURE: 68 MMHG | WEIGHT: 152.31 LBS | TEMPERATURE: 98 F | SYSTOLIC BLOOD PRESSURE: 118 MMHG | BODY MASS INDEX: 23.08 KG/M2 | HEIGHT: 68 IN

## 2024-03-20 DIAGNOSIS — I70.0 AORTIC ATHEROSCLEROSIS: Chronic | ICD-10-CM

## 2024-03-20 DIAGNOSIS — N32.81 OVERACTIVE BLADDER: ICD-10-CM

## 2024-03-20 DIAGNOSIS — R39.198 DIFFICULTY URINATING: ICD-10-CM

## 2024-03-20 DIAGNOSIS — E11.42 TYPE 2 DIABETES MELLITUS WITH DIABETIC POLYNEUROPATHY, WITHOUT LONG-TERM CURRENT USE OF INSULIN: Primary | Chronic | ICD-10-CM

## 2024-03-20 DIAGNOSIS — F32.4 MAJOR DEPRESSIVE DISORDER IN PARTIAL REMISSION, UNSPECIFIED WHETHER RECURRENT: ICD-10-CM

## 2024-03-20 DIAGNOSIS — N18.31 CHRONIC KIDNEY DISEASE, STAGE 3A: ICD-10-CM

## 2024-03-20 DIAGNOSIS — N18.30 STAGE 3 CHRONIC KIDNEY DISEASE, UNSPECIFIED WHETHER STAGE 3A OR 3B CKD: Chronic | ICD-10-CM

## 2024-03-20 PROCEDURE — 3074F SYST BP LT 130 MM HG: CPT | Mod: CPTII,S$GLB,, | Performed by: FAMILY MEDICINE

## 2024-03-20 PROCEDURE — 3288F FALL RISK ASSESSMENT DOCD: CPT | Mod: CPTII,S$GLB,, | Performed by: FAMILY MEDICINE

## 2024-03-20 PROCEDURE — 1160F RVW MEDS BY RX/DR IN RCRD: CPT | Mod: CPTII,S$GLB,, | Performed by: FAMILY MEDICINE

## 2024-03-20 PROCEDURE — 1159F MED LIST DOCD IN RCRD: CPT | Mod: CPTII,S$GLB,, | Performed by: FAMILY MEDICINE

## 2024-03-20 PROCEDURE — 3078F DIAST BP <80 MM HG: CPT | Mod: CPTII,S$GLB,, | Performed by: FAMILY MEDICINE

## 2024-03-20 PROCEDURE — 3072F LOW RISK FOR RETINOPATHY: CPT | Mod: CPTII,S$GLB,, | Performed by: FAMILY MEDICINE

## 2024-03-20 PROCEDURE — 1100F PTFALLS ASSESS-DOCD GE2>/YR: CPT | Mod: CPTII,S$GLB,, | Performed by: FAMILY MEDICINE

## 2024-03-20 PROCEDURE — 1126F AMNT PAIN NOTED NONE PRSNT: CPT | Mod: CPTII,S$GLB,, | Performed by: FAMILY MEDICINE

## 2024-03-20 PROCEDURE — 99999 PR PBB SHADOW E&M-EST. PATIENT-LVL V: CPT | Mod: PBBFAC,,, | Performed by: FAMILY MEDICINE

## 2024-03-20 PROCEDURE — 99214 OFFICE O/P EST MOD 30 MIN: CPT | Mod: S$GLB,,, | Performed by: FAMILY MEDICINE

## 2024-03-20 RX ORDER — GLIPIZIDE 5 MG/1
TABLET ORAL
Qty: 90 TABLET | Refills: 1 | Status: SHIPPED | OUTPATIENT
Start: 2024-03-20

## 2024-03-20 NOTE — ASSESSMENT & PLAN NOTE
Recently improved with A1c down to 6.9%.  Prior to this 8.1%.  This was after I discontinue glimepiride secondary to age and concern about risk of hypoglycemia even though he has not experienced this.  However A1c increased significantly.  Given medication cost, etc., elected to resume the sulfonylurea at a lower dose and short-acting version, Glucotrol 5 mg once a day with large meal, supervised by his wife.  This has worked well, will monitor carefully for hypoglycemia.  If he has not feeling well are not eating she will avoid giving him the dosage.  Lab Results   Component Value Date    HGBA1C 6.9 (H) 03/13/2024    HGBA1C 8.1 (H) 12/13/2023    HGBA1C 6.5 (H) 08/28/2023

## 2024-03-20 NOTE — ASSESSMENT & PLAN NOTE
Stable continue pravastatin.  Consider switching to higher potency statin however given age, stability recommend continuing current course.

## 2024-03-20 NOTE — PROGRESS NOTES
[facility] Care Provider Appointment   [facility]       Patient ID: Cecilio Cespedes is a 84 y.o. male.    ASSESSMENT/PLAN by Problem List:    1. Type 2 diabetes mellitus with diabetic polyneuropathy, without long-term current use of insulin  Assessment & Plan:  Recently improved with A1c down to 6.9%.  Prior to this 8.1%.  This was after I discontinue glimepiride secondary to age and concern about risk of hypoglycemia even though he has not experienced this.  However A1c increased significantly.  Given medication cost, etc., elected to resume the sulfonylurea at a lower dose and short-acting version, Glucotrol 5 mg once a day with large meal, supervised by his wife.  This has worked well, will monitor carefully for hypoglycemia.  If he has not feeling well are not eating she will avoid giving him the dosage.  Lab Results   Component Value Date    HGBA1C 6.9 (H) 03/13/2024    HGBA1C 8.1 (H) 12/13/2023    HGBA1C 6.5 (H) 08/28/2023        Orders:  -     Lipid Panel; Future; Expected date: 03/20/2024  -     Comprehensive Metabolic Panel; Future; Expected date: 03/20/2024  -     Hemoglobin A1C; Future; Expected date: 03/20/2024    2. Stage 3 chronic kidney disease, unspecified whether stage 3a or 3b CKD  Assessment & Plan:  Stable, continue to monitor, avoid NSAIDs.      3. Aortic atherosclerosis  Assessment & Plan:  Stable continue pravastatin.  Consider switching to higher potency statin however given age, stability recommend continuing current course.      4. Major depressive disorder in partial remission, unspecified whether recurrent    5. Chronic kidney disease, stage 3a  Assessment & Plan:  Stable, continue to monitor, avoid NSAIDs.      6. Difficulty urinating  -     Microalbumin/Creatinine Ratio, Urine; Future  -     Urinalysis; Future; Expected date: 03/20/2024  -     Urine culture; Future; Expected date: 03/20/2024  -     Urinalysis Microscopic; Future    7. Overactive  bladder    Other orders  -     glipiZIDE (GLUCOTROL) 5 MG tablet; Take 1 tablet by mouth once daily with mid-day meal.  Dispense: 90 tablet; Refill: 1         Follow Up:  Three months      Subjective:     Chief Complaint   Patient presents with    Follow-up    Dysuria     Difficult to start stream, incontinence - wears Depends     I have reviewed the information entered by the ancillary staff regarding the chief complaint as well as the related history.    HPI    Patient is a/an 84 y.o.  male     Wife reports trouble with urination, awhile to get started  Sometimes pressure in groin  Wearing depends now  On Vesicare per urology, wife feels is helping reduce frequency of urination  See above for all topics addressed today    For complete problem list, past medical history, surgical history, social history, etc., see appropriate section in the electronic medical record    Review of Systems   Constitutional:  Negative for activity change and unexpected weight change.   HENT:  Positive for rhinorrhea. Negative for hearing loss and trouble swallowing.    Eyes:  Negative for discharge and visual disturbance.   Respiratory:  Negative for chest tightness and wheezing.    Cardiovascular:  Negative for chest pain and palpitations.   Gastrointestinal:  Positive for constipation. Negative for blood in stool, diarrhea and vomiting.   Endocrine: Negative for polydipsia and polyuria.   Genitourinary:  Positive for difficulty urinating and frequency. Negative for dysuria, flank pain, hematuria and urgency.   Musculoskeletal:  Negative for arthralgias, joint swelling and neck pain.   Neurological:  Positive for weakness. Negative for headaches.   Psychiatric/Behavioral:  Positive for confusion and dysphoric mood.        Objective     Physical Exam  Vitals reviewed.   Constitutional:       General: He is not in acute distress.     Appearance: He is well-developed. He is not diaphoretic.   HENT:      Head: Normocephalic and  "atraumatic.   Eyes:      General: No scleral icterus.     Conjunctiva/sclera: Conjunctivae normal.   Cardiovascular:      Rate and Rhythm: Normal rate and regular rhythm.      Heart sounds: Murmur heard.   Pulmonary:      Effort: Pulmonary effort is normal. No respiratory distress.   Abdominal:      Comments: Abdomen is soft and flat.  Nondistended.  There is no CVA tenderness to percussion.  No suprapubic distention or tenderness.   Musculoskeletal:      Cervical back: Normal range of motion and neck supple.   Skin:     General: Skin is warm and dry.   Neurological:      Mental Status: He is alert.      Deep Tendon Reflexes: Reflexes are normal and symmetric.      Comments: Wheelchair used for long walk back to the exam room   Psychiatric:         Behavior: Behavior normal.       Vitals:    03/20/24 1306   BP: 118/68   BP Location: Right arm   Patient Position: Sitting   BP Method: Medium (Manual)   Pulse: 74   Resp: 17   Temp: 97.9 °F (36.6 °C)   TempSrc: Oral   SpO2: 98%   Weight: 69.1 kg (152 lb 5.4 oz)   Height: 5' 8" (1.727 m)           THIS DOCUMENT WAS MADE IN PART WITH VOICE RECOGNITION SOFTWARE.  OCCASIONALLY THIS SOFTWARE WILL MISINTERPRET WORDS OR PHRASES.    "

## 2024-03-20 NOTE — ASSESSMENT & PLAN NOTE
History of overactive bladder.  On VESIcare.  Wife thinks this is helping reducing urgency and frequency.  Although he is now wearing depends full-time so hard to say.  Sometimes it takes awhile to get the stream going but once he gets started it seems to be okay as best he can recall.  He really has no input on the matter.  I do not see any physical signs at this moment to suggest urinary retention, no bladder distention.  Discussed signs to watch for.  They feel the VESIcare is helping so will continue for now.  Will check a urine as a precaution.  If things worsen they will let me know or consider following with Urology.

## 2024-03-22 ENCOUNTER — LAB VISIT (OUTPATIENT)
Dept: LAB | Facility: HOSPITAL | Age: 84
End: 2024-03-22
Attending: FAMILY MEDICINE
Payer: MEDICARE

## 2024-03-22 DIAGNOSIS — R39.198 DIFFICULTY URINATING: ICD-10-CM

## 2024-03-22 LAB
ALBUMIN/CREAT UR: 7.6 UG/MG (ref 0–30)
BACTERIA #/AREA URNS HPF: ABNORMAL /HPF
BILIRUB UR QL STRIP: NEGATIVE
CLARITY UR: CLEAR
COLOR UR: YELLOW
CREAT UR-MCNC: 144 MG/DL (ref 23–375)
GLUCOSE UR QL STRIP: ABNORMAL
HGB UR QL STRIP: NEGATIVE
HYALINE CASTS #/AREA URNS LPF: 1 /LPF
KETONES UR QL STRIP: NEGATIVE
LEUKOCYTE ESTERASE UR QL STRIP: NEGATIVE
MICROALBUMIN UR DL<=1MG/L-MCNC: 11 UG/ML
MICROSCOPIC COMMENT: ABNORMAL
NITRITE UR QL STRIP: NEGATIVE
PH UR STRIP: 6 [PH] (ref 5–8)
PROT UR QL STRIP: NEGATIVE
SP GR UR STRIP: 1.02 (ref 1–1.03)
SQUAMOUS #/AREA URNS HPF: 1 /HPF
URN SPEC COLLECT METH UR: ABNORMAL
WBC #/AREA URNS HPF: 1 /HPF (ref 0–5)
YEAST URNS QL MICRO: ABNORMAL

## 2024-03-22 PROCEDURE — 82043 UR ALBUMIN QUANTITATIVE: CPT | Performed by: FAMILY MEDICINE

## 2024-03-22 PROCEDURE — 87086 URINE CULTURE/COLONY COUNT: CPT | Performed by: FAMILY MEDICINE

## 2024-03-22 PROCEDURE — 81000 URINALYSIS NONAUTO W/SCOPE: CPT | Mod: PO | Performed by: FAMILY MEDICINE

## 2024-03-24 LAB
BACTERIA UR CULT: NORMAL
BACTERIA UR CULT: NORMAL

## 2024-05-11 DIAGNOSIS — E11.42 TYPE 2 DIABETES MELLITUS WITH DIABETIC POLYNEUROPATHY, WITHOUT LONG-TERM CURRENT USE OF INSULIN: Chronic | ICD-10-CM

## 2024-06-10 ENCOUNTER — LAB VISIT (OUTPATIENT)
Dept: LAB | Facility: HOSPITAL | Age: 84
End: 2024-06-10
Attending: FAMILY MEDICINE
Payer: MEDICARE

## 2024-06-10 DIAGNOSIS — E11.42 TYPE 2 DIABETES MELLITUS WITH DIABETIC POLYNEUROPATHY, WITHOUT LONG-TERM CURRENT USE OF INSULIN: Chronic | ICD-10-CM

## 2024-06-10 LAB
ALBUMIN SERPL BCP-MCNC: 3.9 G/DL (ref 3.5–5.2)
ALP SERPL-CCNC: 55 U/L (ref 55–135)
ALT SERPL W/O P-5'-P-CCNC: 18 U/L (ref 10–44)
ANION GAP SERPL CALC-SCNC: 13 MMOL/L (ref 8–16)
AST SERPL-CCNC: 17 U/L (ref 10–40)
BILIRUB SERPL-MCNC: 0.4 MG/DL (ref 0.1–1)
BUN SERPL-MCNC: 31 MG/DL (ref 8–23)
CALCIUM SERPL-MCNC: 10.2 MG/DL (ref 8.7–10.5)
CHLORIDE SERPL-SCNC: 107 MMOL/L (ref 95–110)
CHOLEST SERPL-MCNC: 155 MG/DL (ref 120–199)
CHOLEST/HDLC SERPL: 4.1 {RATIO} (ref 2–5)
CO2 SERPL-SCNC: 23 MMOL/L (ref 23–29)
CREAT SERPL-MCNC: 1.8 MG/DL (ref 0.5–1.4)
EST. GFR  (NO RACE VARIABLE): 36.4 ML/MIN/1.73 M^2
ESTIMATED AVG GLUCOSE: 143 MG/DL (ref 68–131)
GLUCOSE SERPL-MCNC: 163 MG/DL (ref 70–110)
HBA1C MFR BLD: 6.6 % (ref 4–5.6)
HDLC SERPL-MCNC: 38 MG/DL (ref 40–75)
HDLC SERPL: 24.5 % (ref 20–50)
LDLC SERPL CALC-MCNC: 103.2 MG/DL (ref 63–159)
NONHDLC SERPL-MCNC: 117 MG/DL
POTASSIUM SERPL-SCNC: 4.2 MMOL/L (ref 3.5–5.1)
PROT SERPL-MCNC: 7.2 G/DL (ref 6–8.4)
SODIUM SERPL-SCNC: 143 MMOL/L (ref 136–145)
TRIGL SERPL-MCNC: 69 MG/DL (ref 30–150)

## 2024-06-10 PROCEDURE — 80053 COMPREHEN METABOLIC PANEL: CPT | Performed by: FAMILY MEDICINE

## 2024-06-10 PROCEDURE — 80061 LIPID PANEL: CPT | Performed by: FAMILY MEDICINE

## 2024-06-10 PROCEDURE — 36415 COLL VENOUS BLD VENIPUNCTURE: CPT | Mod: PN | Performed by: FAMILY MEDICINE

## 2024-06-10 PROCEDURE — 83036 HEMOGLOBIN GLYCOSYLATED A1C: CPT | Performed by: FAMILY MEDICINE

## 2024-06-17 ENCOUNTER — OFFICE VISIT (OUTPATIENT)
Dept: PRIMARY CARE CLINIC | Facility: CLINIC | Age: 84
End: 2024-06-17
Payer: MEDICARE

## 2024-06-17 VITALS
BODY MASS INDEX: 22.57 KG/M2 | WEIGHT: 148.94 LBS | HEIGHT: 68 IN | OXYGEN SATURATION: 98 % | DIASTOLIC BLOOD PRESSURE: 64 MMHG | HEART RATE: 78 BPM | SYSTOLIC BLOOD PRESSURE: 120 MMHG

## 2024-06-17 DIAGNOSIS — F33.41 RECURRENT MAJOR DEPRESSIVE DISORDER, IN PARTIAL REMISSION: ICD-10-CM

## 2024-06-17 DIAGNOSIS — E11.22 CKD STAGE 3 DUE TO TYPE 2 DIABETES MELLITUS: ICD-10-CM

## 2024-06-17 DIAGNOSIS — G31.84 MILD COGNITIVE IMPAIRMENT: ICD-10-CM

## 2024-06-17 DIAGNOSIS — N18.30 CKD STAGE 3 DUE TO TYPE 2 DIABETES MELLITUS: ICD-10-CM

## 2024-06-17 DIAGNOSIS — E11.42 TYPE 2 DIABETES MELLITUS WITH DIABETIC POLYNEUROPATHY, WITHOUT LONG-TERM CURRENT USE OF INSULIN: Primary | Chronic | ICD-10-CM

## 2024-06-17 PROCEDURE — 99999 PR PBB SHADOW E&M-EST. PATIENT-LVL IV: CPT | Mod: PBBFAC,,, | Performed by: FAMILY MEDICINE

## 2024-06-17 NOTE — PROGRESS NOTES
[facility] Care Provider Appointment   [facility]       Patient ID: Cecilio Cespedes is a 84 y.o. male.    ASSESSMENT/PLAN by Problem List:    1. Type 2 diabetes mellitus with diabetic polyneuropathy, without long-term current use of insulin  Assessment & Plan:  Diabetes is satisfactory.  No reported hypoglycemia.  Patient remains on Jardiance as well as low-dose glipizide but watching closely for hypoglycemia.   Renal function not compatible with metformin at this point.  Discussed and reviewed other upcoming health maintenance topics related to diabetes.  No proteinuria.  Blood pressure control no compelling reason to start Ace/ARB at this point  Ophthalmology exam due in August    Lab Results   Component Value Date    HGBA1C 6.6 (H) 06/10/2024    HGBA1C 6.9 (H) 03/13/2024    HGBA1C 8.1 (H) 12/13/2023          2. CKD stage 3 due to type 2 diabetes mellitus  Assessment & Plan:  Most recent creatinine 1.8 which is higher than baseline.  Reviewed adequate water intake.  Avoidance of NSAIDs.  We will monitor.    Orders:  -     Basic Metabolic Panel; Future; Expected date: 06/17/2024    3. Recurrent major depressive disorder, in partial remission  Assessment & Plan:  Denies feeling depressed or significant symptoms of depression.  Still having some issues with motivation but otherwise he is doing well on Lexapro.  We will continue to monitor      4. Mild cognitive impairment  Assessment & Plan:  Cognitive status is fairly stable.  Reviewed his functioning status at home.  He does ambulate around the house with a walker.  Does not require assistance to move from room to room.  He can clean, shave, and dress on his own.  Does require standby assistance when getting into the shower.  There have been no recent falls.  His wife did ask some questions regarding what will happen when his condition worsens.  We discussed that if there is a temporary declined we can always consider home  health but if there is persistent or significant decline she would likely need additional assistance.  They do not have long-term care insurance nor VA benefits.           Follow Up:  2-3 months    Advance Care Planning     Date: 06/17/2024    Power of   I initiated the process of voluntary advance care planning today and explained the importance of this process to the patient.  I introduced the concept of advance directives to the patient, as well. Then the patient received detailed information about the importance of designating a Health Care Power of  (HCPOA). He was also instructed to communicate with this person about their wishes for future healthcare, should he become sick and lose decision-making capacity. The patient has previously appointed a HCPOA. After our discussion, the patient has decided to complete a HCPOA and has appointed his significant other, health care agent:  His wife  & health care agent number:  See chart . I encouraged him to communicate with this person about their wishes for future healthcare, should he become sick and lose decision-making capacity.      Reviewed documents on file no changes.             Subjective:     Chief Complaint   Patient presents with    Follow-up     Patient presents for his 3 month follow up. Patient has no complaints at this time.     I have reviewed the information entered by the ancillary staff regarding the chief complaint as well as the related history.    Follow-up  Pertinent negatives include no arthralgias, chest pain, headaches, joint swelling, neck pain, vomiting or weakness.       Patient is a/an 84 y.o.  male     Routine follow-up.  He is here with his wife.  Seems to be doing pretty well.  No falls or accidents or injuries.  No hypoglycemia.  See above for all details addressed today    For complete problem list, past medical history, surgical history, social history, etc., see appropriate section in the electronic medical  "record    Review of Systems   Constitutional:  Negative for activity change and unexpected weight change.   HENT:  Negative for hearing loss, rhinorrhea and trouble swallowing.    Eyes:  Negative for discharge and visual disturbance.   Respiratory:  Negative for chest tightness and wheezing.    Cardiovascular:  Negative for chest pain and palpitations.   Gastrointestinal:  Negative for blood in stool, constipation, diarrhea and vomiting.   Endocrine: Negative for polydipsia and polyuria.   Genitourinary:  Positive for difficulty urinating. Negative for hematuria and urgency.   Musculoskeletal:  Negative for arthralgias, joint swelling and neck pain.   Neurological:  Negative for weakness and headaches.   Psychiatric/Behavioral:  Positive for dysphoric mood. Negative for confusion.        Objective     Physical Exam  Vitals reviewed.   Constitutional:       General: He is not in acute distress.     Appearance: He is well-developed. He is not diaphoretic.   HENT:      Head: Normocephalic and atraumatic.   Eyes:      General: No scleral icterus.     Conjunctiva/sclera: Conjunctivae normal.   Cardiovascular:      Rate and Rhythm: Normal rate and regular rhythm.      Heart sounds: Murmur heard.   Pulmonary:      Effort: Pulmonary effort is normal. No respiratory distress.      Breath sounds: No wheezing or rales.   Musculoskeletal:      Cervical back: Normal range of motion and neck supple.   Skin:     General: Skin is warm and dry.   Neurological:      Mental Status: He is alert.      Deep Tendon Reflexes: Reflexes are normal and symmetric.      Comments: Wheelchair used for long walk back to the exam room   Psychiatric:         Behavior: Behavior normal.       Vitals:    06/17/24 1331   BP: 120/64   BP Location: Left arm   Patient Position: Sitting   BP Method: Medium (Manual)   Pulse: 78   SpO2: 98%   Weight: 67.5 kg (148 lb 14.7 oz)   Height: 5' 8" (1.727 m)           THIS DOCUMENT WAS MADE IN PART WITH VOICE " RECOGNITION SOFTWARE.  OCCASIONALLY THIS SOFTWARE WILL MISINTERPRET WORDS OR PHRASES.

## 2024-06-17 NOTE — ASSESSMENT & PLAN NOTE
Most recent creatinine 1.8 which is higher than baseline.  Reviewed adequate water intake.  Avoidance of NSAIDs.  We will monitor.

## 2024-06-17 NOTE — ASSESSMENT & PLAN NOTE
Denies feeling depressed or significant symptoms of depression.  Still having some issues with motivation but otherwise he is doing well on Lexapro.  We will continue to monitor

## 2024-06-17 NOTE — ASSESSMENT & PLAN NOTE
Cognitive status is fairly stable.  Reviewed his functioning status at home.  He does ambulate around the house with a walker.  Does not require assistance to move from room to room.  He can clean, shave, and dress on his own.  Does require standby assistance when getting into the shower.  There have been no recent falls.  His wife did ask some questions regarding what will happen when his condition worsens.  We discussed that if there is a temporary declined we can always consider home health but if there is persistent or significant decline she would likely need additional assistance.  They do not have long-term care insurance nor VA benefits.

## 2024-06-17 NOTE — ASSESSMENT & PLAN NOTE
Diabetes is satisfactory.  No reported hypoglycemia.  Patient remains on Jardiance as well as low-dose glipizide but watching closely for hypoglycemia.   Renal function not compatible with metformin at this point.  Discussed and reviewed other upcoming health maintenance topics related to diabetes.  No proteinuria.  Blood pressure control no compelling reason to start Ace/ARB at this point  Ophthalmology exam due in August    Lab Results   Component Value Date    HGBA1C 6.6 (H) 06/10/2024    HGBA1C 6.9 (H) 03/13/2024    HGBA1C 8.1 (H) 12/13/2023

## 2024-08-08 PROBLEM — D72.829 LEUKOCYTOSIS: Status: ACTIVE | Noted: 2024-08-08

## 2024-08-08 PROBLEM — S72.001A CLOSED DISPLACED FRACTURE OF RIGHT FEMORAL NECK: Status: ACTIVE | Noted: 2024-08-08

## 2024-08-08 PROBLEM — R94.31 ABNORMAL EKG: Status: ACTIVE | Noted: 2024-08-08

## 2024-08-08 PROBLEM — Z71.89 ADVANCE CARE PLANNING: Status: ACTIVE | Noted: 2024-08-08

## 2024-08-09 PROBLEM — Z73.6 LIMITATION OF ACTIVITY DUE TO DISABILITY: Status: ACTIVE | Noted: 2024-08-09

## 2024-08-19 DIAGNOSIS — S72.001A CLOSED DISPLACED FRACTURE OF RIGHT FEMORAL NECK: Primary | ICD-10-CM

## 2024-08-22 ENCOUNTER — OFFICE VISIT (OUTPATIENT)
Dept: ORTHOPEDICS | Facility: CLINIC | Age: 84
End: 2024-08-22
Payer: MEDICARE

## 2024-08-22 ENCOUNTER — HOSPITAL ENCOUNTER (OUTPATIENT)
Dept: RADIOLOGY | Facility: HOSPITAL | Age: 84
Discharge: HOME | End: 2024-08-22
Attending: ORTHOPAEDIC SURGERY
Payer: MEDICARE

## 2024-08-22 VITALS — HEIGHT: 68 IN | WEIGHT: 147.94 LBS | BODY MASS INDEX: 22.42 KG/M2

## 2024-08-22 DIAGNOSIS — S72.001A CLOSED DISPLACED FRACTURE OF RIGHT FEMORAL NECK: ICD-10-CM

## 2024-08-22 DIAGNOSIS — S72.001A CLOSED DISPLACED FRACTURE OF RIGHT FEMORAL NECK: Primary | ICD-10-CM

## 2024-08-22 PROCEDURE — 73502 X-RAY EXAM HIP UNI 2-3 VIEWS: CPT | Mod: 26,RT,, | Performed by: RADIOLOGY

## 2024-08-22 PROCEDURE — 3072F LOW RISK FOR RETINOPATHY: CPT | Mod: CPTII,S$GLB,, | Performed by: ORTHOPAEDIC SURGERY

## 2024-08-22 PROCEDURE — 73502 X-RAY EXAM HIP UNI 2-3 VIEWS: CPT | Mod: TC,PO,RT

## 2024-08-22 PROCEDURE — 1159F MED LIST DOCD IN RCRD: CPT | Mod: CPTII,S$GLB,, | Performed by: ORTHOPAEDIC SURGERY

## 2024-08-22 PROCEDURE — 99999 PR PBB SHADOW E&M-EST. PATIENT-LVL III: CPT | Mod: PBBFAC,,, | Performed by: ORTHOPAEDIC SURGERY

## 2024-08-22 PROCEDURE — 99024 POSTOP FOLLOW-UP VISIT: CPT | Mod: S$GLB,,, | Performed by: ORTHOPAEDIC SURGERY

## 2024-08-22 PROCEDURE — 1160F RVW MEDS BY RX/DR IN RCRD: CPT | Mod: CPTII,S$GLB,, | Performed by: ORTHOPAEDIC SURGERY

## 2024-08-22 PROCEDURE — 3288F FALL RISK ASSESSMENT DOCD: CPT | Mod: CPTII,S$GLB,, | Performed by: ORTHOPAEDIC SURGERY

## 2024-08-22 PROCEDURE — 1100F PTFALLS ASSESS-DOCD GE2>/YR: CPT | Mod: CPTII,S$GLB,, | Performed by: ORTHOPAEDIC SURGERY

## 2024-08-22 PROCEDURE — 1125F AMNT PAIN NOTED PAIN PRSNT: CPT | Mod: CPTII,S$GLB,, | Performed by: ORTHOPAEDIC SURGERY

## 2024-09-03 NOTE — PROGRESS NOTES
Chief Complaint   Patient presents with    Right Hip - Pain       HPI:  84 y.o. returns to clinic today status post  right hip hemiarthroplasty 2 weeks ago. Pain is mild. Patient is compliant most of the time with restrictions.     R hip    Incision healed. No erythema or fluctuance. Overall normal alignment. Mild point TTP about the surgical site. Decreased ROM due to stiffness. Compartments soft. Skin intact. NVI distally.      X-rays were performed today, personally reviewed by me and findings discussed with the patient.  2 views of the right hip show implants intact in good position    Closed displaced fracture of right femoral neck        Staples out. Encourage PT. RTC 6 weeks.

## 2024-09-06 ENCOUNTER — TELEPHONE (OUTPATIENT)
Dept: PRIMARY CARE CLINIC | Facility: CLINIC | Age: 84
End: 2024-09-06
Payer: MEDICARE

## 2024-09-06 RX ORDER — LISINOPRIL 2.5 MG/1
2.5 TABLET ORAL DAILY
Qty: 30 TABLET | Refills: 1 | Status: SHIPPED | OUTPATIENT
Start: 2024-09-06 | End: 2024-11-05

## 2024-09-06 RX ORDER — INSULIN GLARGINE 100 [IU]/ML
10 INJECTION, SOLUTION SUBCUTANEOUS NIGHTLY
Qty: 15 ML | Refills: 1 | Status: SHIPPED | OUTPATIENT
Start: 2024-09-06

## 2024-09-06 RX ORDER — INSULIN GLARGINE 100 [IU]/ML
10 INJECTION, SOLUTION SUBCUTANEOUS NIGHTLY
Qty: 3 ML | Refills: 1 | Status: CANCELLED | OUTPATIENT
Start: 2024-09-06 | End: 2024-11-05

## 2024-09-06 RX ORDER — PEN NEEDLE, DIABETIC 30 GX3/16"
NEEDLE, DISPOSABLE MISCELLANEOUS
Qty: 90 EACH | Refills: 3 | Status: SHIPPED | OUTPATIENT
Start: 2024-09-06

## 2024-09-06 RX ORDER — METOPROLOL TARTRATE 25 MG/1
12.5 TABLET, FILM COATED ORAL 2 TIMES DAILY
Qty: 30 TABLET | Refills: 1 | Status: SHIPPED | OUTPATIENT
Start: 2024-09-06 | End: 2024-11-05

## 2024-09-06 NOTE — TELEPHONE ENCOUNTER
Spoke with pt's wife, Karrie, and she reports that pt just got home today from [facility].  Pt was prescribed Metoprolol and Lisinopril while in the hospital and pt continued this medication while he was at [facility].  Additionally, pt was prescribed Lantus and Novolog.  Pt also needs a refill of Lantus.  Requesting refills go to [facility].

## 2024-09-06 NOTE — TELEPHONE ENCOUNTER
----- Message from Serjiomargarito Crews sent at 9/6/2024  3:05 PM CDT -----  Regarding: advice  Contact: Wife Karrie  Type: Needs Medical Advice  Who Called:  Wife Karrie   Symptoms (please be specific):    How long has patient had these symptoms:    Pharmacy name and phone #:    Best Call Back Number: [phone]  Additional Information: Wife would like to know if the pt can be seen next week due to he will run out of medication before seeing the provider. Please call to advise. Thanks   Orthopedic

## 2024-09-07 PROCEDURE — G0180 MD CERTIFICATION HHA PATIENT: HCPCS | Mod: ,,, | Performed by: FAMILY MEDICINE

## 2024-09-09 ENCOUNTER — TELEPHONE (OUTPATIENT)
Dept: PRIMARY CARE CLINIC | Facility: CLINIC | Age: 84
End: 2024-09-09
Payer: MEDICARE

## 2024-09-09 NOTE — TELEPHONE ENCOUNTER
Wife, Karrie, called and asked if patient has labs due before 9/17/24 appt and if he does, can [facility]  have an order sent to have labs drawn at home?    # [phone]

## 2024-09-10 RX ORDER — INSULIN GLARGINE-YFGN 100 [IU]/ML
10 INJECTION, SOLUTION SUBCUTANEOUS NIGHTLY
Qty: 3 ML | Refills: 5 | Status: SHIPPED | OUTPATIENT
Start: 2024-09-10

## 2024-09-10 NOTE — TELEPHONE ENCOUNTER
----- Message from Della Huizar sent at 9/10/2024  2:29 PM CDT -----  Contact: [phone] [facility]  Pharmacy is calling to clarify an RX.    RX name:  insulin glargine U-100, Lantus, (LANTUS SOLOSTAR U-100 INSULIN) 100 unit/mL (3 mL) InPn pen 15 mL    What do they need to clarify:  the insurance will not cover this the insurance wants Semglee to be used of lantus    Comments:     [id number] - [place] [address] AT SEC OF [facility] & [address] [address]  Phone: [phone] Fax: [phone]

## 2024-09-10 NOTE — TELEPHONE ENCOUNTER
Spoke with Rosina at [facility] and confirmed that they received the order for Semglee since Lantus is not covered by pt's insurance.  Informed pt's wife of this change and she verbalized understanding.

## 2024-09-10 NOTE — TELEPHONE ENCOUNTER
Received PA request for Lantus or to change to an acceptable alternative.  Consulted with Dr. Cowart and order changed to SemGlee.

## 2024-09-17 ENCOUNTER — OFFICE VISIT (OUTPATIENT)
Dept: PRIMARY CARE CLINIC | Facility: CLINIC | Age: 84
End: 2024-09-17
Payer: MEDICARE

## 2024-09-17 VITALS
BODY MASS INDEX: 22.79 KG/M2 | DIASTOLIC BLOOD PRESSURE: 84 MMHG | SYSTOLIC BLOOD PRESSURE: 102 MMHG | HEART RATE: 64 BPM | OXYGEN SATURATION: 96 % | WEIGHT: 149.94 LBS

## 2024-09-17 DIAGNOSIS — E11.42 TYPE 2 DIABETES MELLITUS WITH DIABETIC POLYNEUROPATHY, WITH LONG-TERM CURRENT USE OF INSULIN: Primary | ICD-10-CM

## 2024-09-17 DIAGNOSIS — M85.88 OTHER SPECIFIED DISORDERS OF BONE DENSITY AND STRUCTURE, OTHER SITE: ICD-10-CM

## 2024-09-17 DIAGNOSIS — E78.2 MIXED HYPERLIPIDEMIA: Chronic | ICD-10-CM

## 2024-09-17 DIAGNOSIS — Z87.81 H/O FRACTURE OF RIGHT HIP: ICD-10-CM

## 2024-09-17 DIAGNOSIS — Z79.4 TYPE 2 DIABETES MELLITUS WITH DIABETIC POLYNEUROPATHY, WITH LONG-TERM CURRENT USE OF INSULIN: Primary | ICD-10-CM

## 2024-09-17 DIAGNOSIS — D48.5 NEOPLASM OF UNCERTAIN BEHAVIOR OF SKIN: ICD-10-CM

## 2024-09-17 PROCEDURE — 99215 OFFICE O/P EST HI 40 MIN: CPT | Mod: S$GLB,,, | Performed by: FAMILY MEDICINE

## 2024-09-17 PROCEDURE — 1100F PTFALLS ASSESS-DOCD GE2>/YR: CPT | Mod: CPTII,S$GLB,, | Performed by: FAMILY MEDICINE

## 2024-09-17 PROCEDURE — 3074F SYST BP LT 130 MM HG: CPT | Mod: CPTII,S$GLB,, | Performed by: FAMILY MEDICINE

## 2024-09-17 PROCEDURE — 3079F DIAST BP 80-89 MM HG: CPT | Mod: CPTII,S$GLB,, | Performed by: FAMILY MEDICINE

## 2024-09-17 PROCEDURE — 3072F LOW RISK FOR RETINOPATHY: CPT | Mod: CPTII,S$GLB,, | Performed by: FAMILY MEDICINE

## 2024-09-17 PROCEDURE — 3288F FALL RISK ASSESSMENT DOCD: CPT | Mod: CPTII,S$GLB,, | Performed by: FAMILY MEDICINE

## 2024-09-17 PROCEDURE — 1160F RVW MEDS BY RX/DR IN RCRD: CPT | Mod: CPTII,S$GLB,, | Performed by: FAMILY MEDICINE

## 2024-09-17 PROCEDURE — 1159F MED LIST DOCD IN RCRD: CPT | Mod: CPTII,S$GLB,, | Performed by: FAMILY MEDICINE

## 2024-09-17 PROCEDURE — 99999 PR PBB SHADOW E&M-EST. PATIENT-LVL V: CPT | Mod: PBBFAC,,, | Performed by: FAMILY MEDICINE

## 2024-09-17 PROCEDURE — 1126F AMNT PAIN NOTED NONE PRSNT: CPT | Mod: CPTII,S$GLB,, | Performed by: FAMILY MEDICINE

## 2024-09-17 RX ORDER — PEN NEEDLE, DIABETIC, SAFETY 30 GX3/16"
NEEDLE, DISPOSABLE MISCELLANEOUS
COMMUNITY
Start: 2024-09-02

## 2024-09-17 NOTE — ASSESSMENT & PLAN NOTE
After patient's hospitalization and injury he was started on Lantus as well as NovoLog sliding scale.  No recent A1c, will schedule.  Patient is currently taking 10 units of Lantus every evening  NovoLog 3 times a day with meal based on sliding scale in med card.      His wife reports no hypoglycemia or significant difficulty.  She did express concern about having to check glucose so many times a day we discussed continuous glucose monitoring.  She prefers to check with her insurance to see if this is covered and will consider.

## 2024-09-17 NOTE — PROGRESS NOTES
Primary Care Provider Appointment   [facility]       Patient ID: Cecilio Cespedes is a 84 y.o. male.    ASSESSMENT/PLAN by Problem List:    1. Type 2 diabetes mellitus with diabetic polyneuropathy, with long-term current use of insulin  Assessment & Plan:  After patient's hospitalization and injury he was started on Lantus as well as NovoLog sliding scale.  No recent A1c, will schedule.  Patient is currently taking 10 units of Lantus every evening  NovoLog 3 times a day with meal based on sliding scale in med card.      His wife reports no hypoglycemia or significant difficulty.  She did express concern about having to check glucose so many times a day we discussed continuous glucose monitoring.  She prefers to check with her insurance to see if this is covered and will consider.    Orders:  -     Cancel: Comprehensive Metabolic Panel; Future; Expected date: 09/17/2024  -     Cancel: Hemoglobin A1C; Future; Expected date: 09/17/2024  -     Cancel: Lipid Panel; Future; Expected date: 09/17/2024  -     Comprehensive Metabolic Panel; Future; Expected date: 09/17/2024  -     Hemoglobin A1C; Future; Expected date: 09/17/2024  -     Lipid Panel; Future; Expected date: 09/17/2024    2. Mixed hyperlipidemia  -     Cancel: CBC Auto Differential; Future; Expected date: 09/17/2024  -     CBC Auto Differential; Future; Expected date: 09/17/2024    3. H/O fracture of right hip  -     DXA Bone Density Axial Skeleton 1 or more sites; Future; Expected date: 09/17/2024    4. Other specified disorders of bone density and structure, other site  -     DXA Bone Density Axial Skeleton 1 or more sites; Future; Expected date: 09/17/2024    5. Neoplasm of uncertain behavior of skin  -     Ambulatory referral/consult to Dermatology; Future; Expected date: 09/24/2024    Additional discussion  Given fragility fracture, patient can be diagnosed with osteoporosis.  Still will get a DEXA scan for baseline  and anticipate treating.  If DEXA significantly low may qualify for Prolia, although maybe more difficult to get insurance to approve in a male patient, if not then oral bisphosphonate but will to make sure wife administers and supervises    Also one of the challenges we have had over the last few years that has been progressively worsening is his lack of motivation and engagement with any type of physical activity to improve his overall functioning.  Interestingly his wife says that his strength and functioning is now better than before his hip fracture and this is likely because he has been forced to do physical therapy through rehab and now continue with home health.  But he is still has little motivation to do anything on his own when therapist is not there encouraging him.  Discussed in detail, I encouraged him to remain active and engaged.  But suspect this will continue to be a challenge especially after home health completes their therapy  :  Follow Up:  Four weeks    Forty-three minutes of total time spent on the encounter, time includes face to face time, and some or all of the following: review of chart, lab, imaging, consultant notes, ER, hospital, documentation, care coordination, etc.      Subjective:     Chief Complaint   Patient presents with    Follow-up     After hip surgery and rehab       I have reviewed the information entered by the ancillary staff regarding the chief complaint as well as the related history.    Follow-up  Pertinent negatives include no arthralgias, chest pain, headaches, joint swelling, neck pain, vomiting or weakness.       Patient is a/an 84 y.o.  male     Follow-up hospitalization after fall and femoral neck fracture.  This was followed by skilled nursing stay at [facility].  He is now back at home in his wife is his full-time caregiver.  He is now on insulin but they are doing very well although at times this can be overwhelming.      He is ambulating well, denies  significant pain.  His wife states that his baseline strengthen ambulation is better than before the injury because he has had significant engagement with his physical therapy.  However he still does not have motivation on his own in his only doing more PT because of the injury, more frequent visits, etc..    For complete problem list, past medical history, surgical history, social history, etc., see appropriate section in the electronic medical record    Review of Systems   Constitutional:  Negative for activity change and unexpected weight change.   HENT:  Negative for hearing loss, rhinorrhea and trouble swallowing.    Eyes:  Negative for discharge and visual disturbance.   Respiratory:  Negative for chest tightness and wheezing.    Cardiovascular:  Negative for chest pain and palpitations.   Gastrointestinal:  Positive for constipation. Negative for blood in stool, diarrhea and vomiting.   Endocrine: Negative for polydipsia and polyuria.   Genitourinary:  Positive for difficulty urinating and urgency. Negative for hematuria.   Musculoskeletal:  Negative for arthralgias, joint swelling and neck pain.   Neurological:  Negative for weakness and headaches.   Psychiatric/Behavioral:  Positive for dysphoric mood. Negative for confusion.        Objective     Physical Exam  Vitals reviewed.   Constitutional:       General: He is not in acute distress.     Appearance: He is well-developed. He is not ill-appearing.   HENT:      Head: Normocephalic and atraumatic.   Eyes:      General: No scleral icterus.     Conjunctiva/sclera: Conjunctivae normal.   Cardiovascular:      Rate and Rhythm: Normal rate and regular rhythm.      Heart sounds: Murmur heard.   Pulmonary:      Effort: Pulmonary effort is normal. No respiratory distress.      Breath sounds: No wheezing or rales.   Musculoskeletal:      Cervical back: Normal range of motion and neck supple.   Skin:     General: Skin is warm and dry.   Neurological:      Mental  Status: He is alert.      Deep Tendon Reflexes: Reflexes are normal and symmetric.      Comments: Patient is ambulating with a walker.  He is still very unsteady and weak.   Psychiatric:         Behavior: Behavior normal.       Vitals:    09/17/24 1323   BP: 102/84   BP Location: Left arm   Patient Position: Sitting   BP Method: Small (Manual)   Pulse: 64   SpO2: 96%   Weight: 68 kg (149 lb 14.6 oz)           THIS DOCUMENT WAS MADE IN PART WITH VOICE RECOGNITION SOFTWARE.  OCCASIONALLY THIS SOFTWARE WILL MISINTERPRET WORDS OR PHRASES.

## 2024-09-20 RX ORDER — PEN NEEDLE, DIABETIC 30 GX3/16"
NEEDLE, DISPOSABLE MISCELLANEOUS
Qty: 90 EACH | Refills: 3 | Status: SHIPPED | OUTPATIENT
Start: 2024-09-20

## 2024-09-20 RX ORDER — INSULIN ASPART 100 [IU]/ML
INJECTION, SOLUTION INTRAVENOUS; SUBCUTANEOUS
Qty: 5 EACH | Refills: 11 | Status: SHIPPED | OUTPATIENT
Start: 2024-09-20

## 2024-09-20 NOTE — TELEPHONE ENCOUNTER
----- Message from Rosa Sullivan sent at 9/20/2024  1:50 PM CDT -----  Regarding: refills needed  [phone]- call back ; need refill for his    insulin aspart U-100 (NOVOLOG) 100 unit/mL (3 mL) [id number] [place] [address] AT [address] [address]  Phone: [phone] Fax: [phone]  Hours: Not open 24 hours        Send to :

## 2024-09-22 PROBLEM — I35.1 AORTIC REGURGITATION: Status: ACTIVE | Noted: 2024-09-22

## 2024-09-22 PROBLEM — R94.39 ABNORMAL STRESS TEST: Status: ACTIVE | Noted: 2024-09-22

## 2024-09-23 ENCOUNTER — OFFICE VISIT (OUTPATIENT)
Dept: CARDIOLOGY | Facility: CLINIC | Age: 84
End: 2024-09-23
Payer: MEDICARE

## 2024-09-23 VITALS
HEART RATE: 68 BPM | DIASTOLIC BLOOD PRESSURE: 63 MMHG | HEIGHT: 68 IN | SYSTOLIC BLOOD PRESSURE: 103 MMHG | BODY MASS INDEX: 22.79 KG/M2

## 2024-09-23 DIAGNOSIS — R94.39 ABNORMAL STRESS TEST: ICD-10-CM

## 2024-09-23 DIAGNOSIS — I35.1 AORTIC VALVE INSUFFICIENCY, ETIOLOGY OF CARDIAC VALVE DISEASE UNSPECIFIED: Primary | ICD-10-CM

## 2024-09-23 DIAGNOSIS — E78.2 MIXED HYPERLIPIDEMIA: Chronic | ICD-10-CM

## 2024-09-23 LAB
OHS QRS DURATION: 94 MS
OHS QTC CALCULATION: 418 MS

## 2024-09-23 PROCEDURE — 93005 ELECTROCARDIOGRAM TRACING: CPT | Mod: PO

## 2024-09-23 PROCEDURE — 93010 ELECTROCARDIOGRAM REPORT: CPT | Mod: S$GLB,,, | Performed by: INTERNAL MEDICINE

## 2024-09-23 PROCEDURE — 99999 PR PBB SHADOW E&M-EST. PATIENT-LVL III: CPT | Mod: PBBFAC,,,

## 2024-09-23 NOTE — PROGRESS NOTES
Subjective:    Patient ID:  Cecilio Cespedes is a 84 y.o. male patient here for evaluation No chief complaint on file.    History of Present Illness:     Cecilio Cespedes is an 85 y/o male who follows with Dr. Uriarte here today for hospital follow up, accompanied by wife. Last seen in clinic 2018. He was hospitalized 8/8-14/2024 for right hip fracture s/p hemiarthroplasty after fall. He was evaluated by Cardiology via nuclear stress test and echo. Nuclear stress test was interpreted as low to moderate risk, prompting a follow up. He denies CP, SOB/PARDO, palpitations, dizziness, weakness, fatigue.     EKG today: NSR 68, no T wave inversion, minimal ST changes improved from previous EKG.     Focused Past History includes:   Abnormal stress test; Abnormal EKG  EKG 8/2024: ST and T wave abnormality; T wave inversion in inferior and rajni-lateral leads  Regadenoson stress SPECT MPI 8/2024: mild to moderate intensity, small to medium sized, reversible perfusion abnormality that is consistent with ischemia in the basal to mid lateral wall(s).   TTE 8/2024: LVEF 55-60%. Normal RV. Mild AR. Mild PH (PASP 35).   Aortic regurgitation  TTE 8/2024: LVEF 55-60%. Mild AR.  Hyperlipidemia   Type 2 DM  CKD 3a  Former tobacco use, quit in 2016  2 PPD for 20 years          Most Recent Echocardiogram Results  Results for orders placed during the hospital encounter of 08/08/24    Echo Saline Bubble? No; Ultrasound enhancing contrast? No    Interpretation Summary    Left Ventricle: The left ventricle is normal in size. There is mild concentric hypertrophy. There is normal systolic function with a visually estimated ejection fraction of 55 - 60%.    Right Ventricle: Normal right ventricular cavity size. Systolic function is normal.    Aortic Valve: There is mild aortic regurgitation with an eccentrically directed jet.    Pulmonary Artery: There is mild pulmonary hypertension. The estimated pulmonary artery systolic pressure is 35  mmHg.    IVC/SVC: Normal venous pressure at 3 mmHg.    Technically difficult study      Most Recent Nuclear Stress Test Results  Results for orders placed during the hospital encounter of 08/08/24    Nuclear Stress - Cardiology Interpreted    Interpretation Summary    A total regadenoson dose of 0.4 mg was injected.    Abnormal myocardial perfusion scan.    There is a mild to moderate intensity, small to medium sized, reversible perfusion abnormality that is consistent with ischemia in the basal to mid lateral wall(s).    There is mild apical apical thinning which is a normal variant.    The gated perfusion images showed an ejection fraction of 80% post stress.    LV cavity size is normal.    The ECG portion of the study is abnormal but not diagnostic due to resting ST-T abnormalities.    The patient reported no chest pain during the stress test.    There were no arrhythmias during stress.    This is a low, maybe intermediate risk study. The defect is relatively small and basal.      Most Recent Cardiac PET Stress Test Results  No results found for this or any previous visit.      Most Recent Cardiovascular Angiogram results  No results found for this or any previous visit.      Other Most Recent Cardiology Results  Results for orders placed during the hospital encounter of 08/08/24    Cardiac monitoring strips      REVIEW OF SYSTEMS: As noted in HPI   CARDIOVASCULAR: No recent chest pain, palpitations, arm/neck/jaw pain, or edema.  RESPIRATORY: No recent fever, cough, SOB.  : No blood in the urine  GI: No reflux, nausea, vomiting, or blood in stool.   MUSCULOSKELETAL: No falls.   NEURO: No headaches, syncope, or dizziness.  EYES: No sudden changes in vision.     Past Medical History:   Diagnosis Date    Colon polyp     Diabetes 2001    Diverticulosis     Hyperlipidemia     PONV (postoperative nausea and vomiting)     Stage 3 chronic kidney disease 12/12/2018    Type 2 diabetes mellitus      Past Surgical History:    Procedure Laterality Date    CATARACT EXTRACTION Left     COLONOSCOPY  2011    Dr. Dejesus: 2 colon polyps removed (one was 10 mm), diverticulosis, hemorrhoids, repeat in 3 years for surveillance; biopsy: FRAGMENTS OF TUBULAR ADENOMA.    COLONOSCOPY N/A 2020    Procedure: COLONOSCOPY;  Surgeon: Denny Dejesus MD;  Location: [facility] ENDO;  Service: Endoscopy;  Laterality: N/A;    HEMIARTHROPLASTY OF HIP Right 2024    Procedure: HEMIARTHROPLASTY, HIP;  Surgeon: Narciso Rivera MD;  Location: [facility] OR;  Service: Orthopedics;  Laterality: Right;    HERNIA REPAIR       Social History     Tobacco Use    Smoking status: Former     Current packs/day: 0.00     Average packs/day: 2.0 packs/day for 20.0 years (40.0 ttl pk-yrs)     Types: Cigars, Cigarettes     Start date: 1996     Quit date: 2016     Years since quittin.1    Smokeless tobacco: Never    Tobacco comments:     2 cigars per day   Substance Use Topics    Alcohol use: Yes     Comment: very seldom    Drug use: No         Objective    There were no vitals filed for this visit.    LAST EKG  Results for orders placed or performed during the hospital encounter of 24   EKG 12-lead    Collection Time: 24  8:15 AM   Result Value Ref Range    QRS Duration 96 ms    [facility] QTC Calculation 396 ms    Narrative    Test Reason : W19.XXXA,    Vent. Rate : 096 BPM     Atrial Rate : 096 BPM     P-R Int : 154 ms          QRS Dur : 096 ms      QT Int : 314 ms       P-R-T Axes : 058 -15 185 degrees     QTc Int : 396 ms    Normal sinus rhythm  ST and T wave abnormality, consider inferior ischemia  ST and T wave abnormality, consider anterolateral ischemia  Abnormal ECG  When compared with ECG of 25-SEP-2018 17:33,  T wave inversion now evident in Inferior leads  T wave inversion now evident in Anterior-lateral leads  Confirmed by SIVA DIMAS, RENATA ([id number]) on 8/10/2024 7:11:53 AM    Referred By: KIRAN   SELF           Confirmed By:RENATA  "SIVA DIMAS     LIPIDS - LAST 2   Lab Results   Component Value Date    CHOL 155 09/19/2024    CHOL 155 06/10/2024    HDL 30 (L) 09/19/2024    HDL 38 (L) 06/10/2024    LDLCALC 89.2 09/19/2024    LDLCALC 103.2 06/10/2024    TRIG 179 (H) 09/19/2024    TRIG 69 06/10/2024    CHOLHDL 19.4 (L) 09/19/2024    CHOLHDL 24.5 06/10/2024     CARDIAC PROFILE - LAST 2  Lab Results   Component Value Date    TROPONINI <0.012 08/08/2024    TROPONINI <0.012 08/08/2024      CBC - LAST 2  Lab Results   Component Value Date    WBC 8.14 09/19/2024    WBC 9.79 08/15/2024    HGB 13.1 (L) 09/19/2024    HGB 14.4 08/15/2024    HCT 40.3 09/19/2024    HCT 42.4 08/15/2024     09/19/2024     08/15/2024     No results found for: "LABPT", "INR", "APTT"  CHEMISTRY - LAST 2  Lab Results   Component Value Date     09/19/2024     08/15/2024    K 4.6 09/19/2024    K 4.2 08/15/2024    CO2 26 09/19/2024    CO2 24 08/15/2024    BUN 35 (H) 09/19/2024    BUN 34 (H) 08/15/2024    CREATININE 1.43 (H) 09/19/2024    CREATININE 1.30 08/15/2024     (H) 09/19/2024     (H) 08/15/2024    CALCIUM 9.5 09/19/2024    CALCIUM 9.5 08/15/2024    MG 2.0 08/09/2024    ALBUMIN 4.0 09/19/2024    ALBUMIN 5.0 08/08/2024    ALT 13 09/19/2024    ALT 39 08/08/2024    AST 22 09/19/2024    AST 33 08/08/2024      ENDOCRINE - LAST 2  Lab Results   Component Value Date    HGBA1C 7.2 (H) 09/19/2024    HGBA1C 6.6 (H) 06/10/2024    TSH 2.301 01/17/2023    TSH 2.811 09/12/2022        PHYSICAL EXAM  CONSTITUTIONAL: Well built, well nourished in no apparent distress  NECK: no carotid bruit, no JVD  LUNGS: CTA  CHEST WALL: no tenderness  HEART: regular rate and rhythm, S1, S2 normal, no murmur, click, rub or gallop   ABDOMEN: soft, non-tender; bowel sounds normal; no masses,  no organomegaly  EXTREMITIES: Extremities normal, no edema, no calf tenderness noted  NEURO: AAO X 3    I HAVE REVIEWED :    The vital signs, most recent cardiac testing, and most " "recent pertinent non-cardiology provider notes.    Current Outpatient Medications   Medication Instructions    aspirin 325 mg, Oral, Daily    BD AUTOSHIELD DUO PEN NEEDLE 30 gauge x 3/16" Ndle     calcium carbonate/vitamin D3 (VITAMIN D-3 ORAL) 1 capsule, Oral, Nightly    EScitalopram oxalate (LEXAPRO) 10 mg, Oral, Daily    insulin aspart U-100 (NOVOLOG) 100 unit/mL (3 mL) InPn pen Blood Glucose<BR>(mg/dL)    Pre-meal   Bedtime<BR>151-200       2 units       1 unit<BR>201-250       4 units       2 units<BR>251-300       6 units       3 units<BR>301-350       8 units       4 units<BR>>350          10 units       5 units    insulin glargine-yfgn (SEMGLEE(INSULIN GLARG-YFGN)PEN) 10 Units, Subcutaneous, Nightly    JARDIANCE 10 mg, Oral, Daily    lisinopriL (PRINIVIL,ZESTRIL) 2.5 mg, Oral, Daily    metoprolol tartrate (LOPRESSOR) 12.5 mg, Oral, 2 times daily    pen needle, diabetic 32 gauge x 5/32" Ndle Use once daily with Lantus and before meals with NovoLog per sliding scale    pravastatin (PRAVACHOL) 20 mg, Oral, Daily    sodium phosphates (ENEMA) 19-7 gram/118 mL Enem 1 enema, Rectal, Once as needed    solifenacin (VESICARE) 10 mg, Oral, Daily        Assessment & Plan   1. Aortic valve insufficiency, etiology of cardiac valve disease unspecified      2. Abnormal stress test      3. Mixed hyperlipidemia         PLAN:     TTE in 1 year prior to follow up    Continue aspirin 81 mg daily  Continue pravastatin 20 mg daily  Continue empagliflozin 10 mg daily  Continue lisinopril 2.5 mg daily  Continue metoprolol tartrate 12.5 mg BID    Low-cholesterol diet  Regular exercise program      Follow up in about 1 year (around 9/23/2025).     Derik Whitman NP  [facility]   Office: [phone]  "

## 2024-09-25 ENCOUNTER — HOSPITAL ENCOUNTER (OUTPATIENT)
Dept: RADIOLOGY | Facility: HOSPITAL | Age: 84
Discharge: HOME | End: 2024-09-25
Attending: FAMILY MEDICINE
Payer: MEDICARE

## 2024-09-25 DIAGNOSIS — Z00.00 ENCOUNTER FOR MEDICARE ANNUAL WELLNESS EXAM: ICD-10-CM

## 2024-09-25 DIAGNOSIS — Z87.81 H/O FRACTURE OF RIGHT HIP: ICD-10-CM

## 2024-09-25 DIAGNOSIS — M85.88 OTHER SPECIFIED DISORDERS OF BONE DENSITY AND STRUCTURE, OTHER SITE: ICD-10-CM

## 2024-09-25 PROCEDURE — 77080 DXA BONE DENSITY AXIAL: CPT | Mod: 26,,, | Performed by: RADIOLOGY

## 2024-09-25 PROCEDURE — 77091 TBS TECHL CALCULATION ONLY: CPT | Mod: PO

## 2024-09-25 PROCEDURE — 77080 DXA BONE DENSITY AXIAL: CPT | Mod: TC,PO

## 2024-09-25 PROCEDURE — 77092 TBS I&R FX RSK QHP: CPT | Mod: ,,, | Performed by: RADIOLOGY

## 2024-09-25 RX ORDER — INSULIN ASPART 100 [IU]/ML
INJECTION, SOLUTION INTRAVENOUS; SUBCUTANEOUS
Qty: 5 EACH | Refills: 11 | Status: SHIPPED | OUTPATIENT
Start: 2024-09-25

## 2024-09-25 NOTE — TELEPHONE ENCOUNTER
----- Message from Rosa Sullivan sent at 9/25/2024  8:45 AM CDT -----  Regarding: refill needed  [phone] - call back ; need refill for his    insulin aspart U-100 (NOVOLOG) 100 unit/mL (3 mL) InPn pen    Send to :          [facility] #[id number] [place] [address] AT [address] & [facility]  [id number] [address]Mpdmi: [phone] Fax: [phone]: Not open 24 hours         Rosa

## 2024-09-26 RX ORDER — LANCETS 28 GAUGE
EACH MISCELLANEOUS
Qty: 200 EACH | Status: SHIPPED | OUTPATIENT
Start: 2024-09-26

## 2024-09-26 RX ORDER — LANCETS 28 GAUGE
EACH MISCELLANEOUS
Qty: 200 EACH | Status: CANCELLED | OUTPATIENT
Start: 2024-09-26

## 2024-09-30 ENCOUNTER — TELEPHONE (OUTPATIENT)
Dept: PRIMARY CARE CLINIC | Facility: CLINIC | Age: 84
End: 2024-09-30
Payer: MEDICARE

## 2024-10-01 DIAGNOSIS — S72.001A CLOSED DISPLACED FRACTURE OF RIGHT FEMORAL NECK: Primary | ICD-10-CM

## 2024-10-03 ENCOUNTER — HOSPITAL ENCOUNTER (OUTPATIENT)
Dept: RADIOLOGY | Facility: HOSPITAL | Age: 84
Discharge: HOME | End: 2024-10-03
Attending: ORTHOPAEDIC SURGERY
Payer: MEDICARE

## 2024-10-03 ENCOUNTER — OFFICE VISIT (OUTPATIENT)
Dept: ORTHOPEDICS | Facility: CLINIC | Age: 84
End: 2024-10-03
Payer: MEDICARE

## 2024-10-03 VITALS — HEIGHT: 68 IN | WEIGHT: 149.94 LBS | BODY MASS INDEX: 22.72 KG/M2

## 2024-10-03 DIAGNOSIS — S72.001A CLOSED DISPLACED FRACTURE OF RIGHT FEMORAL NECK: ICD-10-CM

## 2024-10-03 DIAGNOSIS — S72.001A CLOSED DISPLACED FRACTURE OF RIGHT FEMORAL NECK: Primary | ICD-10-CM

## 2024-10-03 PROCEDURE — 99999 PR PBB SHADOW E&M-EST. PATIENT-LVL III: CPT | Mod: PBBFAC,,, | Performed by: ORTHOPAEDIC SURGERY

## 2024-10-03 PROCEDURE — 3288F FALL RISK ASSESSMENT DOCD: CPT | Mod: CPTII,S$GLB,, | Performed by: ORTHOPAEDIC SURGERY

## 2024-10-03 PROCEDURE — 99024 POSTOP FOLLOW-UP VISIT: CPT | Mod: S$GLB,,, | Performed by: ORTHOPAEDIC SURGERY

## 2024-10-03 PROCEDURE — 1100F PTFALLS ASSESS-DOCD GE2>/YR: CPT | Mod: CPTII,S$GLB,, | Performed by: ORTHOPAEDIC SURGERY

## 2024-10-03 PROCEDURE — 1160F RVW MEDS BY RX/DR IN RCRD: CPT | Mod: CPTII,S$GLB,, | Performed by: ORTHOPAEDIC SURGERY

## 2024-10-03 PROCEDURE — 3072F LOW RISK FOR RETINOPATHY: CPT | Mod: CPTII,S$GLB,, | Performed by: ORTHOPAEDIC SURGERY

## 2024-10-03 PROCEDURE — 73502 X-RAY EXAM HIP UNI 2-3 VIEWS: CPT | Mod: TC,PO,RT

## 2024-10-03 PROCEDURE — 1126F AMNT PAIN NOTED NONE PRSNT: CPT | Mod: CPTII,S$GLB,, | Performed by: ORTHOPAEDIC SURGERY

## 2024-10-03 PROCEDURE — 73502 X-RAY EXAM HIP UNI 2-3 VIEWS: CPT | Mod: 26,RT,, | Performed by: RADIOLOGY

## 2024-10-03 PROCEDURE — 1159F MED LIST DOCD IN RCRD: CPT | Mod: CPTII,S$GLB,, | Performed by: ORTHOPAEDIC SURGERY

## 2024-10-11 ENCOUNTER — OFFICE VISIT (OUTPATIENT)
Dept: PODIATRY | Facility: CLINIC | Age: 84
End: 2024-10-11
Payer: MEDICARE

## 2024-10-11 DIAGNOSIS — M79.609 PAIN DUE TO ONYCHOMYCOSIS OF NAIL: Primary | ICD-10-CM

## 2024-10-11 DIAGNOSIS — B35.1 PAIN DUE TO ONYCHOMYCOSIS OF NAIL: Primary | ICD-10-CM

## 2024-10-11 PROCEDURE — 99999 PR PBB SHADOW E&M-EST. PATIENT-LVL I: CPT | Mod: PBBFAC,,, | Performed by: PODIATRIST

## 2024-10-11 NOTE — PROGRESS NOTES
Subjective:      Patient ID: Cecilio Cespedes is a 84 y.o. male.    Chief Complaint: No chief complaint on file.    Cecilio is a 84 y.o. male who presents to the clinic complaining of thick and discolored toenails on the right foot great toe. Cecilio is inquiring about treatment options as the nail is so thick it is becoming loose and hard to wear shoes    Review of Systems   Constitutional: Negative for chills and fever.   Cardiovascular:  Negative for claudication and leg swelling.   Respiratory:  Negative for shortness of breath.    Skin:  Positive for nail changes. Negative for itching and rash.   Musculoskeletal:  Negative for muscle cramps, muscle weakness and myalgias.   Gastrointestinal:  Negative for nausea and vomiting.   Neurological:  Negative for focal weakness, loss of balance, numbness and paresthesias.           Objective:      Physical Exam  Constitutional:       General: He is not in acute distress.     Appearance: He is well-developed. He is not diaphoretic.   Cardiovascular:      Pulses:           Dorsalis pedis pulses are 2+ on the right side and 2+ on the left side.        Posterior tibial pulses are 2+ on the right side and 2+ on the left side.      Comments: < 3 sec capillary refill time to toes 1-5 bilateral. Toes and feet are warm to touch proximally with normal distal cooling b/l. There is some hair growth on the feet and toes b/l. There is no edema b/l. No spider veins or varicosities present b/l.     Musculoskeletal:      Comments: Equinus noted b/l ankles with < 10 deg DF noted. MMT 5/5 in DF/PF/Inv/Ev resistance with no reproduction of pain in any direction. Passive range of motion of ankle and pedal joints is painless b/l.     Skin:     General: Skin is warm and dry.      Coloration: Skin is not pale.      Findings: No abrasion, bruising, burn, ecchymosis, erythema, laceration, lesion, petechiae or rash.      Nails: There is no clubbing.      Comments: Skin temperature, texture and  turgor within normal limits.    Right great toenail is thick and discolored with subungual debris and lytic at the distal 80%   Neurological:      Mental Status: He is alert and oriented to person, place, and time.      Sensory: No sensory deficit.      Motor: No tremor, atrophy or abnormal muscle tone.      Comments: Negative tinel sign bilateral.   Psychiatric:         Behavior: Behavior normal.               Assessment:       Encounter Diagnosis   Name Primary?    Pain due to onychomycosis of nail Yes         Plan:       Diagnoses and all orders for this visit:    Pain due to onychomycosis of nail      I counseled the patient on his conditions, their implications and medical management.    The nail was trimmed to good skin relieving the pressure to the area    Discussed using a file to keep the nail filed down as needed    Return ROB Vergara DPM

## 2024-10-14 ENCOUNTER — EXTERNAL HOME HEALTH (OUTPATIENT)
Dept: HOME HEALTH SERVICES | Facility: HOSPITAL | Age: 84
End: 2024-10-14
Payer: MEDICARE

## 2024-10-16 ENCOUNTER — OFFICE VISIT (OUTPATIENT)
Dept: PRIMARY CARE CLINIC | Facility: CLINIC | Age: 84
End: 2024-10-16
Payer: MEDICARE

## 2024-10-16 ENCOUNTER — TELEPHONE (OUTPATIENT)
Dept: PHARMACY | Facility: CLINIC | Age: 84
End: 2024-10-16
Payer: MEDICARE

## 2024-10-16 VITALS
WEIGHT: 143.5 LBS | SYSTOLIC BLOOD PRESSURE: 110 MMHG | HEIGHT: 68 IN | HEART RATE: 66 BPM | DIASTOLIC BLOOD PRESSURE: 60 MMHG | OXYGEN SATURATION: 99 % | BODY MASS INDEX: 21.75 KG/M2

## 2024-10-16 DIAGNOSIS — M85.89 OSTEOPENIA OF MULTIPLE SITES: Primary | ICD-10-CM

## 2024-10-16 DIAGNOSIS — Z79.4 TYPE 2 DIABETES MELLITUS WITH DIABETIC POLYNEUROPATHY, WITH LONG-TERM CURRENT USE OF INSULIN: ICD-10-CM

## 2024-10-16 DIAGNOSIS — N18.30 CKD STAGE 3 DUE TO TYPE 2 DIABETES MELLITUS: ICD-10-CM

## 2024-10-16 DIAGNOSIS — E11.22 CKD STAGE 3 DUE TO TYPE 2 DIABETES MELLITUS: ICD-10-CM

## 2024-10-16 DIAGNOSIS — E11.42 TYPE 2 DIABETES MELLITUS WITH DIABETIC POLYNEUROPATHY, WITH LONG-TERM CURRENT USE OF INSULIN: ICD-10-CM

## 2024-10-16 PROCEDURE — 3288F FALL RISK ASSESSMENT DOCD: CPT | Mod: CPTII,S$GLB,, | Performed by: PHYSICIAN ASSISTANT

## 2024-10-16 PROCEDURE — 99999 PR PBB SHADOW E&M-EST. PATIENT-LVL III: CPT | Mod: PBBFAC,,, | Performed by: PHYSICIAN ASSISTANT

## 2024-10-16 PROCEDURE — 3074F SYST BP LT 130 MM HG: CPT | Mod: CPTII,S$GLB,, | Performed by: PHYSICIAN ASSISTANT

## 2024-10-16 PROCEDURE — 99214 OFFICE O/P EST MOD 30 MIN: CPT | Mod: S$GLB,,, | Performed by: PHYSICIAN ASSISTANT

## 2024-10-16 PROCEDURE — 1101F PT FALLS ASSESS-DOCD LE1/YR: CPT | Mod: CPTII,S$GLB,, | Performed by: PHYSICIAN ASSISTANT

## 2024-10-16 PROCEDURE — 3072F LOW RISK FOR RETINOPATHY: CPT | Mod: CPTII,S$GLB,, | Performed by: PHYSICIAN ASSISTANT

## 2024-10-16 PROCEDURE — 3078F DIAST BP <80 MM HG: CPT | Mod: CPTII,S$GLB,, | Performed by: PHYSICIAN ASSISTANT

## 2024-10-16 RX ORDER — ALENDRONATE SODIUM 70 MG/1
70 TABLET ORAL
Qty: 4 TABLET | Refills: 11 | Status: SHIPPED | OUTPATIENT
Start: 2024-10-16 | End: 2025-10-16

## 2024-10-16 NOTE — Clinical Note
Started him on Fosamax. They were wondering if he could go back on Glipizide. I know its not ideal. Could we also do an Ozempic? They want to try to keep sticks to a minimum. 
- - -

## 2024-10-16 NOTE — PATIENT INSTRUCTIONS
Fosomax to be taken on an empty stomach 30 minutes before other medications or food. Must sit upright for 30 minutes after taking.

## 2024-10-16 NOTE — TELEPHONE ENCOUNTER
[facility]/Population Health Chart Review & Patient Outreach Details For Medication Adherence Project    Reason for Outreach Encounter: 3rd Party payor non-compliance report ([employer], [employer], C, etc)  2.  Patient Outreach Method: Reviewed patient chart  and [employer] message  3.   Medication in question:   Diabetes Medications               empagliflozin (JARDIANCE) 10 mg tablet Take 1 tablet (10 mg total) by mouth once daily.    insulin aspart U-100 (NOVOLOG) 100 unit/mL (3 mL) InPn pen Blood Glucose  (mg/dL)    Pre-meal   Bedtime  151-200       2 units       1 unit  201-250       4 units       2 units  251-300       6 units       3 units  301-350       8 units       4 units  >350          10 units       5 units    insulin glargine-yfgn (SEMGLEE,INSULIN GLARG-YFGN,PEN) 100 unit/mL (3 mL) InPn Inject 10 Units into the skin every evening.              Hyperlipidemia Medications               pravastatin (PRAVACHOL) 20 MG tablet Take 1 tablet (20 mg total) by mouth once daily.                  Jardiance  last filled  9/25/24 for 60 day supply  Pravastatin  last filled 9/5/24 for 14 day supply    4.  Reviewed and or Updates Made To: Patient Chart  5. Outreach Outcomes and/or actions taken: Patient filled medication and is on track to be adherent and Sent inquiry to patient: Waiting for response  Additional Notes:

## 2024-10-17 PROBLEM — M85.89 OSTEOPENIA OF MULTIPLE SITES: Status: ACTIVE | Noted: 2024-10-17

## 2024-10-17 NOTE — ASSESSMENT & PLAN NOTE
- Started Fosamax (alendronate) 1 tablet by mouth every 7 days on an empty stomach, 30 minutes before food, other medications, or lying down.

## 2024-10-17 NOTE — ASSESSMENT & PLAN NOTE
- Explained importance of hydration for kidney function.  - Increased daily fluid intake to improve hydration and support kidney function.

## 2024-10-17 NOTE — PROGRESS NOTES
"Subjective:      Patient ID: Cecilio Cespedes is a 84 y.o. male.    Vitals:  height is 5' 8" (1.727 m) and weight is 65.1 kg (143 lb 8.3 oz). His blood pressure is 110/60 and his pulse is 66. His oxygen saturation is 99%.     Chief Complaint: Results (DEXA, ) and Hip Pain (Sits all day. Butt hurst )    History of Present Illness    CHIEF COMPLAINT:  The patient presents for follow-up regarding a recent DXA scan performed due to a femur fracture, with concerns about osteoporosis and osteopenia.    HPI:  Cecilio recently underwent a DXA scan following a femur fracture, considered a major bone fracture. The scan revealed decreased bone density, indicating osteopenia. He reports discomfort in the buttocks area, describing it as uncomfortable rather than painful.     The patient is currently on insulin therapy, initiated during a hospital stay for hip surgery on August 8th. Prior to surgery, he managed diabetes with oral medications including Jardiance and glipizide. The transition to insulin occurred in the hospital, and he has remained on it since. The patient checks blood sugar 4 times daily - before meals and at bedtime. He is on both short-acting and long-acting insulins.  Doing the fingersticks 4 times daily has become TTS.  Wife questions why he remains on insulin instead of returned to original medications.    The patient's kidney function has been a concern, with BUN levels indicating possible dehydration. Kidney function dropped to a low of 28 during the hospital stay, though it has fluctuated since.    Patient has a growing skin lesion on his head. A dermatology appointment is scheduled for the 30th to evaluate this lesion, previously suggested as possible basal cell carcinoma. The patient has been manipulating the lesion, causing bleeding and potential infection risk.    The patient denies difficulty swallowing, history of bariatric surgery, daily nausea, and vomiting.    MEDICATIONS:  The patient is on " Lantus and NovoLog for diabetes management. He is also taking Metoprolol, Lisinopril, baby aspirin, Vitamin D3, Lexapro, and Vesicare.    MEDICAL HISTORY:  The patient has a history of osteopenia, Type 2 Diabetes, kidney dysfunction, and hypertension.    SURGICAL HISTORY:  The patient underwent hip surgery on August 8th of the current year.    IMAGING:  A recent DXA scan showed a decrease in the patient's bone density.  Diagnosis of osteopenia.      ROS:  Gastrointestinal: -nausea, -vomiting, - difficulty swallowing  Integumentary: +skin lesion          Objective:     Physical Exam   Constitutional: He does not appear ill. No distress.   HENT:   Head: Atraumatic.      Comments: There is a 3 x 2 cm area crust on top of patient's head.  There was no surrounding erythema.  There is no purulent discharge.  Ears:   Right Ear: External ear normal.   Left Ear: External ear normal.   Cardiovascular: Normal rate and regular rhythm.   Murmur heard.  Pulmonary/Chest: Effort normal. No respiratory distress.   Abdominal: Normal appearance.   Neurological: He is alert.      Comments: Sitting comfortably in wheelchair   Skin: Skin is warm, dry and not pale. jaundice  Nursing note and vitals reviewed.      Assessment:     1. Osteopenia of multiple sites    2. CKD stage 3 due to type 2 diabetes mellitus    3. Type 2 diabetes mellitus with diabetic polyneuropathy, with long-term current use of insulin        Plan:     Assessment & Plan    Patient has osteopenia with recent femur fracture, indicating need for bone-strengthening medication  Considered Fosamax (alendronate) as initial treatment for osteoporosis, given patient's history and current condition  Evaluated kidney function and hydration status due to fluctuating BUN levels and previous drop in kidney function during hospitalization  Assessed current diabetes management, considering potential medication adjustments pending discussion with Dr. West  Noted skin lesion on  patient's head, awaiting dermatology evaluation.  No evidence of infection today.    Osteopenia of multiple sites    - Started Fosamax (alendronate) 1 tablet by mouth every 7 days on an empty stomach, 30 minutes before food, other medications, or lying down.    CKD stage 3 due to type 2 diabetes mellitus    - Most recent GFR 48 which is about his average.  - Dropped as low as 28 while in hospital.  - Explained importance of hydration for kidney function.  - Increased daily fluid intake to improve hydration and support kidney function.    Type 2 diabetes mellitus with diabetic polyneuropathy, with long-term current use of insulin    -Was recently on Glipizide prior to Hospital stay. Wife wondering if they can return to this medication.  -Patient does not like having to do 4 finger sticks a day, but wife also concerned about CGM.  -Discussed if he remains on insulin that at least CGM will eliminate multiple sticks.  -Will discuss with Dr. West about medication regimen.    Other orders  -     alendronate (FOSAMAX) 70 MG tablet; Take 1 tablet (70 mg total) by mouth every 7 days. On an empty stomach, 30 minutes before food or laying down  Dispense: 4 tablet; Refill: 11      This note was generated with the assistance of ambient listening technology. Verbal consent was obtained by the patient and accompanying visitor(s) for the recording of patient appointment to facilitate this note. I attest to having reviewed and edited the generated note for accuracy, though some syntax or spelling errors may persist. Please contact the author of this note for any clarification.     My total time spent on this encounter was 33 minutes which included  the following activities: preparing to see the patient, performing a medically appropriate and/or evaluation, counseling and educating the patient and family/caregiver, ordering medications, tests, or procedures, referring and communicating with other healthcare providers,  documenting clinical information in the electronic or other health record, and independently interpreting results. This time is independent and non-overlapping.

## 2024-10-18 NOTE — ASSESSMENT & PLAN NOTE
His diabetes is doing much better.  Continue Jardiance.  If his A1c remains excellent I may look at reducing the glimepiride as previously discussed.  He will need to continue to work on healthy diet.  
Patient did home health and PT for awhile but his wife feels the physical therapy did not last long enough although he was not really motivated showing improvement.  She does have some concerns about using the walker correctly and would like a brief outpatient consult to measure the walker high correctly and instruct him.  She is not convinced he will want to do long-term PT.  Referral entered.  
No

## 2024-10-18 NOTE — PROGRESS NOTES
Chief Complaint   Patient presents with    Right Hip - Post-op Evaluation       HPI:  84 y.o. male returns to clinic today status post right hip hemiarthroplasty 8 weeks ago. Pain is now gone. Patient is compliant most of the time with restrictions.     right Hip Exam   Tenderness   The patient is experiencing no tenderness.   Incision healed    Range of Motion   The patient has normal right hip ROM.    Muscle Strength   Flexion: 4/5     Other   Erythema: absent  Sensation: normal  Pulse: present    X-rays were performed today, personally reviewed by me and findings discussed with the patient.  2 views of the right hip show implants intact with no evidence of loosening    Closed displaced fracture of right femoral neck        He notes he is back to his baseline with no pain. RTC 3 months with repeat xrays.

## 2024-10-21 ENCOUNTER — TELEPHONE (OUTPATIENT)
Dept: PRIMARY CARE CLINIC | Facility: CLINIC | Age: 84
End: 2024-10-21
Payer: MEDICARE

## 2024-10-21 NOTE — TELEPHONE ENCOUNTER
Spoke with pt's wife, discussed your message, she verbalized understanding.  Upon further discussion, pt's wife reports that she has been giving pt the bedtime dosing of insulin for pre-meals instead of the pre-meal dosing.  Educated pt's wife about the correct dosing schedule and she will  a copy of the correct sliding scale tomorrow from the clinic.  Pt's wife verbalized understanding to all.

## 2024-10-21 NOTE — TELEPHONE ENCOUNTER
Karrie called inquiring about patient's insulin schedule, as he can take up to 5 inj daily, but why is the 4th one necessary?    #[phone]    LOV 10/16/24  NOV 1/21/25

## 2024-10-21 NOTE — TELEPHONE ENCOUNTER
I do not feel that he needs to be on five injections daily.  The insulin glargine is his basal/long-acting insulin which is once a day    The NovoLog is a short-acting that can be taken 3 times a day with meals.  If however he does have a random very high glucose then they could take an extra one per sliding scale but I do not feel that he needs to have 4 NovoLog injections scheduled per day

## 2024-10-30 ENCOUNTER — OFFICE VISIT (OUTPATIENT)
Dept: DERMATOLOGY | Facility: CLINIC | Age: 84
End: 2024-10-30
Payer: MEDICARE

## 2024-10-30 DIAGNOSIS — L98.8 EROSIVE PUSTULAR DERMATOSIS: ICD-10-CM

## 2024-10-30 PROCEDURE — 1159F MED LIST DOCD IN RCRD: CPT | Mod: CPTII,S$GLB,, | Performed by: STUDENT IN AN ORGANIZED HEALTH CARE EDUCATION/TRAINING PROGRAM

## 2024-10-30 PROCEDURE — 99213 OFFICE O/P EST LOW 20 MIN: CPT | Mod: S$GLB,,, | Performed by: STUDENT IN AN ORGANIZED HEALTH CARE EDUCATION/TRAINING PROGRAM

## 2024-10-30 PROCEDURE — 1126F AMNT PAIN NOTED NONE PRSNT: CPT | Mod: CPTII,S$GLB,, | Performed by: STUDENT IN AN ORGANIZED HEALTH CARE EDUCATION/TRAINING PROGRAM

## 2024-10-30 PROCEDURE — 1100F PTFALLS ASSESS-DOCD GE2>/YR: CPT | Mod: CPTII,S$GLB,, | Performed by: STUDENT IN AN ORGANIZED HEALTH CARE EDUCATION/TRAINING PROGRAM

## 2024-10-30 PROCEDURE — 3288F FALL RISK ASSESSMENT DOCD: CPT | Mod: CPTII,S$GLB,, | Performed by: STUDENT IN AN ORGANIZED HEALTH CARE EDUCATION/TRAINING PROGRAM

## 2024-10-30 PROCEDURE — 1160F RVW MEDS BY RX/DR IN RCRD: CPT | Mod: CPTII,S$GLB,, | Performed by: STUDENT IN AN ORGANIZED HEALTH CARE EDUCATION/TRAINING PROGRAM

## 2024-10-30 PROCEDURE — 3072F LOW RISK FOR RETINOPATHY: CPT | Mod: CPTII,S$GLB,, | Performed by: STUDENT IN AN ORGANIZED HEALTH CARE EDUCATION/TRAINING PROGRAM

## 2024-10-30 RX ORDER — DOXYCYCLINE HYCLATE 100 MG
TABLET ORAL
Qty: 14 TABLET | Refills: 0 | Status: SHIPPED | OUTPATIENT
Start: 2024-10-30

## 2024-10-30 RX ORDER — CLOBETASOL PROPIONATE 0.5 MG/G
OINTMENT TOPICAL 2 TIMES DAILY
Qty: 60 G | Refills: 0 | Status: SHIPPED | OUTPATIENT
Start: 2024-10-30

## 2024-11-04 RX ORDER — METOPROLOL TARTRATE 25 MG/1
12.5 TABLET, FILM COATED ORAL 2 TIMES DAILY
Qty: 30 TABLET | Refills: 1 | Status: SHIPPED | OUTPATIENT
Start: 2024-11-04 | End: 2025-01-03

## 2024-11-04 RX ORDER — LISINOPRIL 2.5 MG/1
2.5 TABLET ORAL DAILY
Qty: 30 TABLET | Refills: 1 | Status: SHIPPED | OUTPATIENT
Start: 2024-11-04 | End: 2025-01-03

## 2024-11-11 ENCOUNTER — TELEPHONE (OUTPATIENT)
Dept: PRIMARY CARE CLINIC | Facility: CLINIC | Age: 84
End: 2024-11-11
Payer: MEDICARE

## 2024-11-11 NOTE — TELEPHONE ENCOUNTER
Spoke with pt's wife.  She is inquiring about home health for pt as pt is having an increase in difficulty with walking and ADLs.  She is going to attempt to shower pt tonight and she will call back if she feels like pt still needs a referral to HH.

## 2024-11-11 NOTE — TELEPHONE ENCOUNTER
----- Message from Rosa sent at 11/11/2024 11:04 AM CST -----  Regarding: pt advice - care questions  [phone] - call back wife Karrie ; have a question about care that could possibly require orders.    Rosa

## 2024-12-02 RX ORDER — SOLIFENACIN SUCCINATE 10 MG/1
10 TABLET, FILM COATED ORAL DAILY
Qty: 30 TABLET | Refills: 11 | Status: SHIPPED | OUTPATIENT
Start: 2024-12-02

## 2024-12-11 ENCOUNTER — OFFICE VISIT (OUTPATIENT)
Dept: PRIMARY CARE CLINIC | Facility: CLINIC | Age: 84
End: 2024-12-11
Payer: MEDICARE

## 2024-12-11 VITALS
SYSTOLIC BLOOD PRESSURE: 100 MMHG | WEIGHT: 142 LBS | HEIGHT: 68 IN | OXYGEN SATURATION: 98 % | DIASTOLIC BLOOD PRESSURE: 64 MMHG | BODY MASS INDEX: 21.52 KG/M2 | HEART RATE: 62 BPM

## 2024-12-11 DIAGNOSIS — Z23 FLU VACCINE NEED: ICD-10-CM

## 2024-12-11 DIAGNOSIS — G31.84 MILD COGNITIVE IMPAIRMENT: Chronic | ICD-10-CM

## 2024-12-11 DIAGNOSIS — F33.41 RECURRENT MAJOR DEPRESSIVE DISORDER, IN PARTIAL REMISSION: Chronic | ICD-10-CM

## 2024-12-11 DIAGNOSIS — E11.42 TYPE 2 DIABETES MELLITUS WITH DIABETIC POLYNEUROPATHY, WITH LONG-TERM CURRENT USE OF INSULIN: Primary | ICD-10-CM

## 2024-12-11 DIAGNOSIS — Z79.4 TYPE 2 DIABETES MELLITUS WITH DIABETIC POLYNEUROPATHY, WITH LONG-TERM CURRENT USE OF INSULIN: Primary | ICD-10-CM

## 2024-12-11 PROBLEM — Z73.6 LIMITATION OF ACTIVITY DUE TO DISABILITY: Chronic | Status: ACTIVE | Noted: 2024-08-09

## 2024-12-11 PROBLEM — M79.674 PAIN IN TOE OF RIGHT FOOT: Status: RESOLVED | Noted: 2018-09-11 | Resolved: 2024-12-11

## 2024-12-11 PROBLEM — R06.02 SOB (SHORTNESS OF BREATH) ON EXERTION: Status: RESOLVED | Noted: 2018-10-15 | Resolved: 2024-12-11

## 2024-12-11 PROBLEM — F32.4 MAJOR DEPRESSIVE DISORDER IN PARTIAL REMISSION: Chronic | Status: ACTIVE | Noted: 2023-01-23

## 2024-12-11 PROCEDURE — 3288F FALL RISK ASSESSMENT DOCD: CPT | Mod: CPTII,S$GLB,, | Performed by: FAMILY MEDICINE

## 2024-12-11 PROCEDURE — 1159F MED LIST DOCD IN RCRD: CPT | Mod: CPTII,S$GLB,, | Performed by: FAMILY MEDICINE

## 2024-12-11 PROCEDURE — 3072F LOW RISK FOR RETINOPATHY: CPT | Mod: CPTII,S$GLB,, | Performed by: FAMILY MEDICINE

## 2024-12-11 PROCEDURE — 99215 OFFICE O/P EST HI 40 MIN: CPT | Mod: 25,S$GLB,, | Performed by: FAMILY MEDICINE

## 2024-12-11 PROCEDURE — 3074F SYST BP LT 130 MM HG: CPT | Mod: CPTII,S$GLB,, | Performed by: FAMILY MEDICINE

## 2024-12-11 PROCEDURE — 1160F RVW MEDS BY RX/DR IN RCRD: CPT | Mod: CPTII,S$GLB,, | Performed by: FAMILY MEDICINE

## 2024-12-11 PROCEDURE — 99999 PR PBB SHADOW E&M-EST. PATIENT-LVL IV: CPT | Mod: PBBFAC,,, | Performed by: FAMILY MEDICINE

## 2024-12-11 PROCEDURE — G0008 ADMIN INFLUENZA VIRUS VAC: HCPCS | Mod: S$GLB,,, | Performed by: FAMILY MEDICINE

## 2024-12-11 PROCEDURE — 90653 IIV ADJUVANT VACCINE IM: CPT | Mod: S$GLB,,, | Performed by: FAMILY MEDICINE

## 2024-12-11 PROCEDURE — 1101F PT FALLS ASSESS-DOCD LE1/YR: CPT | Mod: CPTII,S$GLB,, | Performed by: FAMILY MEDICINE

## 2024-12-11 PROCEDURE — 3078F DIAST BP <80 MM HG: CPT | Mod: CPTII,S$GLB,, | Performed by: FAMILY MEDICINE

## 2024-12-11 PROCEDURE — 1126F AMNT PAIN NOTED NONE PRSNT: CPT | Mod: CPTII,S$GLB,, | Performed by: FAMILY MEDICINE

## 2024-12-11 RX ORDER — ASPIRIN 81 MG/1
81 TABLET ORAL DAILY
COMMUNITY
Start: 2024-12-11 | End: 2025-12-11

## 2024-12-11 NOTE — PROGRESS NOTES
"Pt presents for flu shot, identified by name and date of birth. Administered to left deltoid via aseptic technique, 25 g 1" needle, no blood noted, sterile bandaid applied, tolerated well. Pt instructed to remain in clinic for 15 min of monitoring, pt verbalized understanding.      "

## 2024-12-12 NOTE — PROGRESS NOTES
Primary Care Provider Appointment   [facility] [place]       Patient ID: Cecilio Cespedes is a 84 y.o. male.    ASSESSMENT/PLAN by Problem List:    Assessment & Plan    IMPRESSION:  - Maintained low-dose insulin regimen to minimize hypoglycemia risk, given patient's age and limited mobility  - Adjusted insulin scale to eliminate 2-unit dose for blood sugars <200 mg/dL, aiming to simplify regimen while maintaining adequate glycemic control  - Considered allowing slightly higher blood sugar targets compared to younger patients, balancing risks and benefits    TYPE 2 DIABETES MELLITUS:  - Cecilio to check blood sugar and update doctor or Juanita in a few weeks regarding new insulin regimen.  - Continued long-acting insulin 10 units at bedtime.  - Discontinued 2-unit dose for blood sugars 150-200 mg/dL; continued 4-unit dose for blood sugars 201-250 mg/dL; continued 6-unit dose for blood sugars >250 mg/dL.  - Hemoglobin A1C and CMP ordered for January follow-up visit.    COGNITIVE IMPAIRMENT AND DEPRESSION:  - Discussed inevitable decline in physical strength and mobility with aging, emphasizing need for proactive planning.  - Explained potential future care needs if patient becomes unable to transfer independently, including options like home health aides, full-time caregivers, or nursing home placement.  - Clarified limitations of home health services for long-term care needs.  -his wife says his daughter will be visiting them next month.  She currently lives out of state but will be retiring in after the spring school semester and moving back home to help take care of her father.  His wife's goal is to keep him somewhat independent at least until his daughter can move back home.  Encouraged her again to consider home health and PT to help improve his strength, she declines at this time.    MOBILITY ISSUES AND FALL PREVENTION:  - Cecilio to set up lower bed at home to reduce fall risk  during transfers.  - Consider physical therapy to maintain/improve strength, though patient expressed reluctance.  We have referred to physical therapy multiple times.  After few sessions he does not cooperate and does not continue ongoing efforts.  This has been a repeated pattern and therefore his wife sees no benefit and resuming    MEDICATION MANAGEMENT:  Clarified current aspirin dosage, on 81 mg not 325 risk and benefit with continuing this, will continue for now    PREVENTIVE CARE:  - Flu shot administered in office.    FOLLOW-UP:  - Follow up at the end of January.         ORDERS, CODING, ADDITIONAL DOCUMENTATION RELATED TO THIS ENCOUNTER:  1. Type 2 diabetes mellitus with diabetic polyneuropathy, with long-term current use of insulin  -     Comprehensive Metabolic Panel; Future; Expected date: 12/11/2024  -     Hemoglobin A1C; Future; Expected date: 12/11/2024    2. Mild cognitive impairment    3. Recurrent major depressive disorder, in partial remission    4. Flu vaccine need  -     influenza (adjuvanted) (Fluad) 45 mcg/0.5 mL IM vaccine (> or = 66 yo) 0.5 mL    Other orders  -     aspirin (ECOTRIN) 81 MG EC tablet; Take 1 tablet (81 mg total) by mouth once daily.           Follow Up:  Four weeks as scheduled  At least 44 minutes of total time spent on the encounter, the majority of the time was face-to-face counseling with the patient and wife, the remainder involved chart review, hospital follow-up, imaging review, lab review, coordination of care etc.       Subjective:       HPI    Patient is a/an 84 y.o.  male     For complete problem list, past medical history, surgical history, social history, etc., see appropriate section in the electronic medical record    History of Present Illness    CHIEF COMPLAINT:  84-year-old male presents today for emergency room follow-up.    RECENT FALL AND ER VISIT:  He reports falling on December 2nd when he slid out of his wheelchair. The following day, he experienced  left hip pain when ambulating with his walker, prompting an emergency room visit on December 3rd. X-rays of the left hip and pelvis showed no evidence of acute fracture or dislocation. CT of the head showed no acute intracranial abnormalities. He has been primarily using a wheelchair since the fall, as he feels uncomfortable using a walker.    MOBILITY AND DAILY ACTIVITIES:  He is primarily using a wheelchair for mobility. He has attempted to use a walker but experiences fatigue and discomfort, leading to a preference for the wheelchair. He demonstrates difficulty using the walker and has been primarily wheelchair-bound since his first fall, which resulted in a right hip fracture and surgery. He requires assistance with transfers, including moving to and from the toilet and bed, though he occasionally performs these transfers independently.    MEDICAL HISTORY:  He has a history of mild cognitive impairment and was at his baseline mental status after the recent fall. He also has a history of a right hip fracture that occurred several months ago. The repair from this previous fracture has shown no evidence of disruption.    DIABETES MANAGEMENT:  He is currently on an insulin regimen consisting of 10 units of long-acting insulin at night and short-acting insulin with meals based on blood sugar. Blood sugar levels are reported to fluctuate. He denies experiencing any recent low blood sugar episodes.    MEDICATIONS:  He is currently taking 81 mg aspirin daily. He is also taking Vitamin D3 supplements, recently switched from 1,000 IU to 5,000 IU, with a recommendation to take it 3 times per week (Monday, Wednesday, Friday).      ROS:  General: +fatigue       Review of Systems   Constitutional:  Positive for activity change and fatigue.   HENT: Negative.     Respiratory:  Negative for shortness of breath.    Cardiovascular:  Negative for palpitations and leg swelling.   Gastrointestinal:  Negative for abdominal distention  "and abdominal pain.   Genitourinary:  Negative for difficulty urinating.   Musculoskeletal:  Positive for arthralgias and gait problem.   Neurological:  Positive for weakness. Negative for tremors.   Psychiatric/Behavioral:  Negative for dysphoric mood, sleep disturbance and suicidal ideas.        Objective     Physical Exam  Vitals:    12/11/24 1439   BP: 100/64   BP Location: Left arm   Patient Position: Sitting   Pulse: 62   SpO2: 98%   Weight: 64.4 kg (141 lb 15.6 oz)   Height: 5' 8" (1.727 m)     Physical Exam                This note was generated with the assistance of ambient listening technology. Verbal consent was obtained by the patient and accompanying visitor(s) for the recording of patient appointment to facilitate this note. I attest to having reviewed and edited the generated note for accuracy, though some syntax or spelling errors may persist. Please contact the author of this note for any clarification.  Parts of the note were also generated with voice recognition software.  Occasionally this software will misinterpreted words or phrases  "

## 2024-12-24 DIAGNOSIS — S72.001A CLOSED DISPLACED FRACTURE OF RIGHT FEMORAL NECK: Primary | ICD-10-CM

## 2025-01-06 RX ORDER — METOPROLOL TARTRATE 25 MG/1
12.5 TABLET, FILM COATED ORAL 2 TIMES DAILY
Qty: 30 TABLET | Refills: 1 | Status: SHIPPED | OUTPATIENT
Start: 2025-01-06 | End: 2025-03-07

## 2025-01-06 RX ORDER — LISINOPRIL 2.5 MG/1
2.5 TABLET ORAL DAILY
Qty: 30 TABLET | Refills: 1 | Status: SHIPPED | OUTPATIENT
Start: 2025-01-06 | End: 2025-03-07

## 2025-01-07 ENCOUNTER — OFFICE VISIT (OUTPATIENT)
Dept: ORTHOPEDICS | Facility: CLINIC | Age: 85
End: 2025-01-07
Payer: MEDICARE

## 2025-01-07 ENCOUNTER — HOSPITAL ENCOUNTER (OUTPATIENT)
Dept: RADIOLOGY | Facility: HOSPITAL | Age: 85
Discharge: HOME | End: 2025-01-07
Attending: ORTHOPAEDIC SURGERY
Payer: MEDICARE

## 2025-01-07 VITALS
HEIGHT: 68 IN | BODY MASS INDEX: 21.52 KG/M2 | SYSTOLIC BLOOD PRESSURE: 100 MMHG | DIASTOLIC BLOOD PRESSURE: 64 MMHG | HEART RATE: 62 BPM | WEIGHT: 142 LBS

## 2025-01-07 DIAGNOSIS — S72.001A CLOSED DISPLACED FRACTURE OF RIGHT FEMORAL NECK: ICD-10-CM

## 2025-01-07 DIAGNOSIS — S72.001A CLOSED DISPLACED FRACTURE OF RIGHT FEMORAL NECK: Primary | ICD-10-CM

## 2025-01-07 PROCEDURE — 73502 X-RAY EXAM HIP UNI 2-3 VIEWS: CPT | Mod: 26,RT,, | Performed by: RADIOLOGY

## 2025-01-07 PROCEDURE — 3078F DIAST BP <80 MM HG: CPT | Mod: CPTII,S$GLB,, | Performed by: ORTHOPAEDIC SURGERY

## 2025-01-07 PROCEDURE — 1126F AMNT PAIN NOTED NONE PRSNT: CPT | Mod: CPTII,S$GLB,, | Performed by: ORTHOPAEDIC SURGERY

## 2025-01-07 PROCEDURE — 99999 PR PBB SHADOW E&M-EST. PATIENT-LVL IV: CPT | Mod: PBBFAC,,, | Performed by: ORTHOPAEDIC SURGERY

## 2025-01-07 PROCEDURE — 1159F MED LIST DOCD IN RCRD: CPT | Mod: CPTII,S$GLB,, | Performed by: ORTHOPAEDIC SURGERY

## 2025-01-07 PROCEDURE — 99213 OFFICE O/P EST LOW 20 MIN: CPT | Mod: S$GLB,,, | Performed by: ORTHOPAEDIC SURGERY

## 2025-01-07 PROCEDURE — 3288F FALL RISK ASSESSMENT DOCD: CPT | Mod: CPTII,S$GLB,, | Performed by: ORTHOPAEDIC SURGERY

## 2025-01-07 PROCEDURE — 1101F PT FALLS ASSESS-DOCD LE1/YR: CPT | Mod: CPTII,S$GLB,, | Performed by: ORTHOPAEDIC SURGERY

## 2025-01-07 PROCEDURE — 73502 X-RAY EXAM HIP UNI 2-3 VIEWS: CPT | Mod: TC,PO,RT

## 2025-01-07 PROCEDURE — 3074F SYST BP LT 130 MM HG: CPT | Mod: CPTII,S$GLB,, | Performed by: ORTHOPAEDIC SURGERY

## 2025-01-07 PROCEDURE — 1160F RVW MEDS BY RX/DR IN RCRD: CPT | Mod: CPTII,S$GLB,, | Performed by: ORTHOPAEDIC SURGERY

## 2025-01-10 NOTE — PROGRESS NOTES
"  Chief Complaint   Patient presents with    Right Hip - Post-op Evaluation       HPI:   This is a 84 y.o. who returns today status post right hip hemiarthroplasty 5 months ago. Patient has been walking to bathroom and transferring. His wife notes he is now back to baseline.  Pain is dull. No numbness or tingling. No associated signs or symptoms.    Past Medical History:   Diagnosis Date    Colon polyp     Diabetes 2001    Diverticulosis     Hyperlipidemia     PONV (postoperative nausea and vomiting)     Stage 3 chronic kidney disease 12/12/2018    Type 2 diabetes mellitus      Past Surgical History:   Procedure Laterality Date    CATARACT EXTRACTION Left     COLONOSCOPY  03/14/2011    Dr. Dejesus: 2 colon polyps removed (one was 10 mm), diverticulosis, hemorrhoids, repeat in 3 years for surveillance; biopsy: FRAGMENTS OF TUBULAR ADENOMA.    COLONOSCOPY N/A 09/01/2020    Procedure: COLONOSCOPY;  Surgeon: Denny Dejesus MD;  Location: [facility] ENDO;  Service: Endoscopy;  Laterality: N/A;    HEMIARTHROPLASTY OF HIP Right 8/8/2024    Procedure: HEMIARTHROPLASTY, HIP;  Surgeon: Narciso Rivera MD;  Location: [facility] OR;  Service: Orthopedics;  Laterality: Right;    HERNIA REPAIR       Current Outpatient Medications on File Prior to Visit   Medication Sig Dispense Refill    alendronate (FOSAMAX) 70 MG tablet Take 1 tablet (70 mg total) by mouth every 7 days. On an empty stomach, 30 minutes before food or laying down 4 tablet 11    aspirin (ECOTRIN) 81 MG EC tablet Take 1 tablet (81 mg total) by mouth once daily.      BD AUTOSHIELD DUO PEN NEEDLE 30 gauge x 3/16" Ndle       blood sugar diagnostic (FREESTYLE LITE STRIPS) Strp Check blood glucose once to twice daily 200 each PRN    calcium carbonate/vitamin D3 (VITAMIN D-3 ORAL) Take 1 capsule by mouth every evening.      clobetasol 0.05% (TEMOVATE) 0.05 % Oint Apply topically 2 (two) times daily. 60 g 0    empagliflozin (JARDIANCE) 10 mg tablet Take 1 tablet (10 mg total) " "by mouth once daily. 90 tablet 3    EScitalopram oxalate (LEXAPRO) 10 MG tablet Take 1 tablet (10 mg total) by mouth once daily. (Patient taking differently: Take 10 mg by mouth every evening.) 90 tablet 3    insulin aspart U-100 (NOVOLOG) 100 unit/mL (3 mL) InPn pen Blood Glucose  (mg/dL)    Pre-meal   Bedtime  151-200       2 units       1 unit  201-250       4 units       2 units  251-300       6 units       3 units  301-350       8 units       4 units  >350          10 units       5 units 5 each 11    insulin glargine-yfgn (SEMGLEE,INSULIN GLARG-YFGN,PEN) 100 unit/mL (3 mL) InPn Inject 10 Units into the skin every evening. 3 mL 5    lancets (FREESTYLE LANCETS) 28 gauge Misc Check blood glucose once to twice daily 200 each PRN    lisinopriL (PRINIVIL,ZESTRIL) 2.5 MG tablet Take 1 tablet (2.5 mg total) by mouth once daily. 30 tablet 1    metoprolol tartrate (LOPRESSOR) 25 MG tablet Take one-half tablet (12.5 mg total) by mouth 2 (two) times daily. 30 tablet 1    pen needle, diabetic 32 gauge x 5/32" Ndle Use once daily with Lantus and before meals with NovoLog per sliding scale 90 each 3    pravastatin (PRAVACHOL) 20 MG tablet Take 1 tablet (20 mg total) by mouth once daily. 90 tablet 3    sodium phosphates (ENEMA) 19-7 gram/118 mL Enem Place 1 enema rectally once as needed (constipation).      solifenacin (VESICARE) 10 MG tablet Take 1 tablet (10 mg total) by mouth once daily. 30 tablet 11     No current facility-administered medications on file prior to visit.     Review of patient's allergies indicates:   Allergen Reactions    No known allergies      Family History   Problem Relation Name Age of Onset    Diabetes Father      Diabetes Brother      Cancer Mother          stomach    Celiac disease Neg Hx      Colon cancer Neg Hx      Crohn's disease Neg Hx      Ulcerative colitis Neg Hx      Stomach cancer Neg Hx      Esophageal cancer Neg Hx      Melanoma Neg Hx      Psoriasis Neg Hx      Lupus Neg Hx      Eczema " Neg Hx      Amblyopia Neg Hx      Blindness Neg Hx      Glaucoma Neg Hx      Macular degeneration Neg Hx      Retinal detachment Neg Hx      Strabismus Neg Hx       Social History     Socioeconomic History    Marital status:    Tobacco Use    Smoking status: Former     Current packs/day: 0.00     Average packs/day: 2.0 packs/day for 20.0 years (40.0 ttl pk-yrs)     Types: Cigars, Cigarettes     Start date: 1996     Quit date: 2016     Years since quittin.4    Smokeless tobacco: Never    Tobacco comments:     2 cigars per day   Substance and Sexual Activity    Alcohol use: Yes     Comment: very seldom    Drug use: No     Social Drivers of Health     Financial Resource Strain: Low Risk  (2024)    Overall Financial Resource Strain (CARDIA)     Difficulty of Paying Living Expenses: Not very hard   Food Insecurity: No Food Insecurity (2024)    Hunger Vital Sign     Worried About Running Out of Food in the Last Year: Never true     Ran Out of Food in the Last Year: Never true   Transportation Needs: No Transportation Needs (2024)    TRANSPORTATION NEEDS     Transportation : No   Physical Activity: Inactive (2024)    Exercise Vital Sign     Days of Exercise per Week: 0 days     Minutes of Exercise per Session: 0 min   Stress: No Stress Concern Present (2024)    [employer] - Occupational Stress Questionnaire     Feeling of Stress : Only a little   Housing Stability: Low Risk  (2024)    Housing Stability Vital Sign     Unable to Pay for Housing in the Last Year: No     Homeless in the Last Year: No       Review of Systems:  Constitutional:  Denies fever or chills   Eyes:  Denies change in visual acuity   HENT:  Denies nasal congestion or sore throat   Respiratory:  Denies cough or shortness of breath   Cardiovascular:  Denies chest pain or edema   GI:  Denies abdominal pain, nausea, vomiting, bloody stools or diarrhea   :  Denies dysuria    Integument:  Denies rash   Neurologic:  Denies headache, focal weakness or sensory changes   Endocrine:  Denies polyuria or polydipsia   Lymphatic:  Denies swollen glands   Psychiatric:  Denies depression or anxiety     Physical Exam:   Constitutional:  Well developed, well nourished, no acute distress, non-toxic appearance   Integument:  Well hydrated, no rash   Lymphatic:  No lymphadenopathy noted   Neurologic:  Alert & oriented x 3, CN 2-12 normal, normal motor function, normal sensory function, no focal deficits noted   Psychiatric:  Speech and behavior appropriate   Gi: abdomen soft  Eyes: EOMI    L hip  Exam performed same as contralateral side and is normal  R hip  Stable to stress. Incision healed. Leg lengths clinically equal. Skin intact. 4/5 quad. NVI distally.     X-rays were performed today, personally reviewed by me and findings discussed with the patient.  2 views of the right hip show hardware intact in good position      Closed displaced fracture of right femoral neck        Continue as tolerated. RTC 3 months

## 2025-01-13 ENCOUNTER — LAB VISIT (OUTPATIENT)
Dept: LAB | Facility: HOSPITAL | Age: 85
End: 2025-01-13
Attending: FAMILY MEDICINE
Payer: MEDICARE

## 2025-01-13 DIAGNOSIS — E11.42 TYPE 2 DIABETES MELLITUS WITH DIABETIC POLYNEUROPATHY, WITH LONG-TERM CURRENT USE OF INSULIN: ICD-10-CM

## 2025-01-13 DIAGNOSIS — Z79.4 TYPE 2 DIABETES MELLITUS WITH DIABETIC POLYNEUROPATHY, WITH LONG-TERM CURRENT USE OF INSULIN: ICD-10-CM

## 2025-01-13 PROCEDURE — 80053 COMPREHEN METABOLIC PANEL: CPT | Performed by: FAMILY MEDICINE

## 2025-01-13 PROCEDURE — 83036 HEMOGLOBIN GLYCOSYLATED A1C: CPT | Performed by: FAMILY MEDICINE

## 2025-01-14 LAB
ALBUMIN SERPL BCP-MCNC: 3.8 G/DL (ref 3.5–5.2)
ALP SERPL-CCNC: 65 U/L (ref 40–150)
ALT SERPL W/O P-5'-P-CCNC: 16 U/L (ref 10–44)
ANION GAP SERPL CALC-SCNC: 9 MMOL/L (ref 8–16)
AST SERPL-CCNC: 14 U/L (ref 10–40)
BILIRUB SERPL-MCNC: 0.4 MG/DL (ref 0.1–1)
BUN SERPL-MCNC: 35 MG/DL (ref 8–23)
CALCIUM SERPL-MCNC: 9.4 MG/DL (ref 8.7–10.5)
CHLORIDE SERPL-SCNC: 104 MMOL/L (ref 95–110)
CO2 SERPL-SCNC: 24 MMOL/L (ref 23–29)
CREAT SERPL-MCNC: 1.7 MG/DL (ref 0.5–1.4)
EST. GFR  (NO RACE VARIABLE): 39 ML/MIN/1.73 M^2
ESTIMATED AVG GLUCOSE: 194 MG/DL (ref 68–131)
GLUCOSE SERPL-MCNC: 231 MG/DL (ref 70–110)
HBA1C MFR BLD: 8.4 % (ref 4–5.6)
POTASSIUM SERPL-SCNC: 4.6 MMOL/L (ref 3.5–5.1)
PROT SERPL-MCNC: 6.9 G/DL (ref 6–8.4)
SODIUM SERPL-SCNC: 137 MMOL/L (ref 136–145)

## 2025-01-17 ENCOUNTER — TELEPHONE (OUTPATIENT)
Dept: PRIMARY CARE CLINIC | Facility: CLINIC | Age: 85
End: 2025-01-17
Payer: MEDICARE

## 2025-01-17 NOTE — TELEPHONE ENCOUNTER
Called pt to inform him that we don't know if our office will be open in the afternoon on 1/21 due to the winter weather.  Spoke with pt's wife and offered for pt to be seen on Tuesday morning via a virtual visit since pt is active on the [employer] portal.  Pt reports that he wants to be seen in person.  Appt rescheduled per pt request to 2/24 at 1440.

## 2025-01-30 DIAGNOSIS — Z00.00 ENCOUNTER FOR MEDICARE ANNUAL WELLNESS EXAM: ICD-10-CM

## 2025-02-03 ENCOUNTER — OFFICE VISIT (OUTPATIENT)
Facility: CLINIC | Age: 85
End: 2025-02-03
Payer: MEDICARE

## 2025-02-03 DIAGNOSIS — L72.0 EPIDERMAL INCLUSION CYST: ICD-10-CM

## 2025-02-03 DIAGNOSIS — L57.0 AK (ACTINIC KERATOSIS): ICD-10-CM

## 2025-02-03 DIAGNOSIS — L82.1 SK (SEBORRHEIC KERATOSIS): ICD-10-CM

## 2025-02-03 DIAGNOSIS — L98.8 EROSIVE PUSTULAR DERMATOSIS OF SCALP: Primary | ICD-10-CM

## 2025-02-03 DIAGNOSIS — D23.5 DILATED PORE OF WINER OF BACK: ICD-10-CM

## 2025-02-03 PROCEDURE — 99214 OFFICE O/P EST MOD 30 MIN: CPT | Mod: 25,S$GLB,, | Performed by: DERMATOLOGY

## 2025-02-03 PROCEDURE — 99999 PR PBB SHADOW E&M-EST. PATIENT-LVL III: CPT | Mod: PBBFAC,,, | Performed by: DERMATOLOGY

## 2025-02-03 PROCEDURE — 3288F FALL RISK ASSESSMENT DOCD: CPT | Mod: CPTII,S$GLB,, | Performed by: DERMATOLOGY

## 2025-02-03 PROCEDURE — 17003 DESTRUCT PREMALG LES 2-14: CPT | Mod: S$GLB,,, | Performed by: DERMATOLOGY

## 2025-02-03 PROCEDURE — 17000 DESTRUCT PREMALG LESION: CPT | Mod: S$GLB,,, | Performed by: DERMATOLOGY

## 2025-02-03 PROCEDURE — 1159F MED LIST DOCD IN RCRD: CPT | Mod: CPTII,S$GLB,, | Performed by: DERMATOLOGY

## 2025-02-03 PROCEDURE — 1101F PT FALLS ASSESS-DOCD LE1/YR: CPT | Mod: CPTII,S$GLB,, | Performed by: DERMATOLOGY

## 2025-02-03 NOTE — PATIENT INSTRUCTIONS
Ketoconazole shampoo - wash scalp, in/around ears, beard area / brows / face 2-3 times weekly. Let sit a few minutes prior to rinsing off.       CRYOSURGERY      Your doctor has used a method called cryosurgery to treat your skin condition. Cryosurgery refers to the use of very cold substances to treat a variety of skin conditions such as warts, pre-skin cancers, molluscum contagiosum, sun spots, and several benign growths. The substance we use in cryosurgery is liquid nitrogen and is so cold (-195 degrees Celsius) that is burns when administered.     Following treatment in the office, the skin may immediately burn and become red. You may find the area around the lesion is affected as well. It is sometimes necessary to treat not only the lesion, but a small area of the surrounding normal skin to achieve a good response.     A blister, and even a blood filled blister, may form after treatment.   This is a normal response. If the blister is painful, it is acceptable to sterilize a needle and with rubbing alcohol and gently pop the blister. It is important that you gently wash the area with soap and warm water as the blister fluid may contain wart virus if a wart was treated. Do no remove the roof of the blister.     The area treated can take anywhere from 1-3 weeks to heal. Healing time depends on the kind skin lesion treated, the location, and how aggressively the lesion was treated. It is recommended that the areas treated are covered with Vaseline or bacitracin ointment and a band-aid. If a band-aid is not practical, just ointment applied several times per day will do. Keeping these areas moist will speed the healing time.    Treatment with liquid nitrogen can leave a scar. In dark skin, it may be a light or dark scar, in light skin it may be a white or pink scar. These will generally fade with time, but may never go away completely.     If you have any concerns after your treatment, please feel free to call the  office.       [address] [place] [address]/ [phone] FAX/ [email or web address] What Are the Symptoms of Skin Cancer?  A change in your skin is the most common sign of skin cancer. This could be a new growth, a sore that doesnt heal, or a change in a mole. Not all skin cancers look the same.    For melanoma specifically, a simple way to remember the warning signs is to remember the A-B-C-D-Es of melanoma--    A stands for asymmetrical. Does the mole or spot have an irregular shape with two parts that look very different?  B stands for border. Is the border irregular or jagged?  C is for color. Is the color uneven?  D is for diameter. Is the mole or spot larger than the size of a pea?  E is for evolving. Has the mole or spot changed during the past few weeks or months?    Talk to your doctor if you notice changes in your skin such as a new growth, a sore that doesnt heal, a change in an old growth, or any of the A-B-C-D-Es of melanoma    What Can I Do to Reduce My Risk of Skin Cancer?  Protection from ultraviolet (UV) radiation is important all year, not just during the summer or at the beach. UV rays from the sun can reach you on cloudy and hazy days, not just on bright and angel days. UV rays also reflect off of surfaces like water, cement, sand, and snow. Indoor tanning (using a tanning bed, estrada, or sunlamp to get tan) exposes users to UV radiation.    The hours between 10 a.m. and 4 p.m. Daylight Saving Time (9 a.m. to 3 p.m. standard time) are the most hazardous for UV exposure outdoors in the continental [place]. UV rays from sunlight are the greatest during the late spring and early summer in [place].    CDC recommends easy options for protection from UV radiation--    Stay in the shade or indoors, especially during midday hours.  Wear clothing that covers your arms and legs.  Wear a hat with a wide brim to shade your face, head, ears, and neck.  Wear  sunglasses that wrap around and block both UVA and UVB rays.  Use sunscreen with a sun protection factor (SPF) of 30 or higher, and both UVA and UVB (broad spectrum) protection.  Avoid indoor tanning.    Adapted from https://www.cdc.gov/cancer/skin/basic_info/

## 2025-02-03 NOTE — PROGRESS NOTES
Subjective:      Patient ID:  Cecilio Cespedes is a 84 y.o. male who presents for   Chief Complaint   Patient presents with    Follow-up     Scalp and back check      HPI    Established patient.  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   LV 10/2024 with Clee - flare of EPD at scalp, rx'd clobetasol ointment and PO doxycycline 100 mg daily with great response. Wife says scalp has been looking good.   Also c/o blackhead at back.      +AK  Cryotherapy, Efudex (scalp)     No personal or known family hx skin cancer.      Review of Systems   Constitutional:  Negative for fever, chills and fatigue.   Respiratory:  Negative for cough and shortness of breath.    Gastrointestinal:  Negative for nausea, vomiting and diarrhea.   Skin:  Positive for wears hat.   Hematologic/Lymphatic: Bruises/bleeds easily (asa 325).       Objective:   Physical Exam   Constitutional: He appears well-developed and well-nourished.   Neurological: He is alert and oriented to person, place, and time.   Psychiatric: He has a normal mood and affect.   Skin:                    Diagram Legend     Erythematous scaling macule/papule c/w actinic keratosis       Vascular papule c/w angioma      Pigmented verrucoid papule/plaque c/w seborrheic keratosis      Yellow umbilicated papule c/w sebaceous hyperplasia      Irregularly shaped tan macule c/w lentigo     1-2 mm smooth white papules consistent with Milia      Movable subcutaneous cyst with punctum c/w epidermal inclusion cyst      Subcutaneous movable cyst c/w pilar cyst      Firm pink to brown papule c/w dermatofibroma      Pedunculated fleshy papule(s) c/w skin tag(s)      Evenly pigmented macule c/w junctional nevus     Mildly variegated pigmented, slightly irregular-bordered macule c/w mildly atypical nevus      Flesh colored to evenly pigmented papule c/w intradermal nevus       Pink pearly papule/plaque c/w basal cell carcinoma      Erythematous hyperkeratotic cursted plaque c/w SCC      Surgical scar with no sign  of skin cancer recurrence      Open and closed comedones      Inflammatory papules and pustules      Verrucoid papule consistent consistent with wart     Erythematous eczematous patches and plaques     Dystrophic onycholytic nail with subungual debris c/w onychomycosis     Umbilicated papule    Erythematous-base heme-crusted tan verrucoid plaque consistent with inflamed seborrheic keratosis     Erythematous Silvery Scaling Plaque c/w Psoriasis     See annotation      Assessment / Plan:      AK (actinic keratosis)  - Discussed diagnosis, etiology, and precancerous nature of condition.   - Cryosurgery Procedure Note: Discussed procedure with patient/patient's guardian including risks and benefits as well as treatment alternatives. Risks of procedure include pain, itching, swelling, redness, blistering, crusting, wound formation, post-inflammatory pigmentary alteration, scar, recurrence. Verbal consent obtained. LN2 cryosurgery performed to 2 lesion(s). Patient tolerated procedure well. After-visit wound care instructions reviewed and provided in writing.      SK (seborrheic keratosis)  Epidermal inclusion cyst  Dilated pore of Jey of back  - Benign; reassured treatment not necessary.       HX EPD  Wife to send in photo of scalp if cryotherapy triggers flare of EPD for treatment.   Patient to start maintenance ketoconazole shampoo 2-3x weekly. Also discussed can treat concomitant seb derm at scalp, ears, face.   - Counseled on potential SE of medication(s) and instructed on use.            No follow-ups on file.

## 2025-02-21 RX ORDER — INSULIN GLARGINE-YFGN 100 [IU]/ML
INJECTION, SOLUTION SUBCUTANEOUS
Qty: 3 ML | Refills: 5 | Status: SHIPPED | OUTPATIENT
Start: 2025-02-21

## 2025-02-24 ENCOUNTER — OFFICE VISIT (OUTPATIENT)
Dept: PRIMARY CARE CLINIC | Facility: CLINIC | Age: 85
End: 2025-02-24
Payer: MEDICARE

## 2025-02-24 VITALS
DIASTOLIC BLOOD PRESSURE: 68 MMHG | SYSTOLIC BLOOD PRESSURE: 106 MMHG | HEIGHT: 68 IN | WEIGHT: 145.81 LBS | BODY MASS INDEX: 22.1 KG/M2 | HEART RATE: 61 BPM | OXYGEN SATURATION: 98 %

## 2025-02-24 DIAGNOSIS — N18.30 CKD STAGE 3 DUE TO TYPE 2 DIABETES MELLITUS: ICD-10-CM

## 2025-02-24 DIAGNOSIS — N18.31 CHRONIC KIDNEY DISEASE, STAGE 3A: ICD-10-CM

## 2025-02-24 DIAGNOSIS — E11.42 TYPE 2 DIABETES MELLITUS WITH DIABETIC POLYNEUROPATHY, WITH LONG-TERM CURRENT USE OF INSULIN: Primary | ICD-10-CM

## 2025-02-24 DIAGNOSIS — Z79.4 TYPE 2 DIABETES MELLITUS WITH DIABETIC POLYNEUROPATHY, WITH LONG-TERM CURRENT USE OF INSULIN: Primary | ICD-10-CM

## 2025-02-24 DIAGNOSIS — E78.2 MIXED HYPERLIPIDEMIA: Chronic | ICD-10-CM

## 2025-02-24 DIAGNOSIS — E11.22 CKD STAGE 3 DUE TO TYPE 2 DIABETES MELLITUS: ICD-10-CM

## 2025-02-24 PROCEDURE — 99999 PR PBB SHADOW E&M-EST. PATIENT-LVL V: CPT | Mod: PBBFAC,,, | Performed by: FAMILY MEDICINE

## 2025-02-24 PROCEDURE — 1159F MED LIST DOCD IN RCRD: CPT | Mod: CPTII,S$GLB,, | Performed by: FAMILY MEDICINE

## 2025-02-24 PROCEDURE — 99214 OFFICE O/P EST MOD 30 MIN: CPT | Mod: S$GLB,,, | Performed by: FAMILY MEDICINE

## 2025-02-24 PROCEDURE — 3288F FALL RISK ASSESSMENT DOCD: CPT | Mod: CPTII,S$GLB,, | Performed by: FAMILY MEDICINE

## 2025-02-24 PROCEDURE — 1101F PT FALLS ASSESS-DOCD LE1/YR: CPT | Mod: CPTII,S$GLB,, | Performed by: FAMILY MEDICINE

## 2025-02-24 PROCEDURE — 1160F RVW MEDS BY RX/DR IN RCRD: CPT | Mod: CPTII,S$GLB,, | Performed by: FAMILY MEDICINE

## 2025-02-24 PROCEDURE — 1126F AMNT PAIN NOTED NONE PRSNT: CPT | Mod: CPTII,S$GLB,, | Performed by: FAMILY MEDICINE

## 2025-02-24 RX ORDER — INSULIN GLARGINE-YFGN 100 [IU]/ML
12 INJECTION, SOLUTION SUBCUTANEOUS NIGHTLY
Qty: 3 ML | Refills: 5 | Status: SHIPPED | OUTPATIENT
Start: 2025-02-24

## 2025-02-24 RX ORDER — BLOOD-GLUCOSE SENSOR
EACH MISCELLANEOUS
Qty: 3 EACH | Refills: 11 | Status: SHIPPED | OUTPATIENT
Start: 2025-02-24

## 2025-02-24 RX ORDER — BLOOD-GLUCOSE,RECEIVER,CONT
EACH MISCELLANEOUS
Qty: 1 EACH | Status: SHIPPED | OUTPATIENT
Start: 2025-02-24

## 2025-02-24 NOTE — PROGRESS NOTES
Primary Care Provider Appointment   [facility]       Patient ID: Cecilio Cespedes is a 85 y.o. male.    ASSESSMENT/PLAN by Problem List:    Assessment & Plan     IMPRESSION:  - A1C increased to 8.4 from 7.2, despite previous reduction in insulin dosage due to frequent hypoglycemia  - Slight worsening of CKD stage 3 with creatinine increase to 1.7  - Hyperlipidemia relatively stable on current regimen  - Will adjust insulin regimen to improve glycemic control while balancing hypoglycemia risk:  - - Increased long-acting insulin from 10 to 12 units at bedtime  - - Adjusted sliding scale threshold from 200 to 175 for morning dose  - - Consider further increases of basal insulin to 14-16 units based on morning glucose readings    DIABETES:  - Assessed the patient's diabetes control, noting worsening with A1c increased to 8.4 from 7.2.  Target less than a given age and risk of hypoglycemia  - Evaluated average glucose over the last 3 months, estimated at 195 mg/dL, higher than the previous average of 160 mg/dL.  - Determined current insulin regimen needs adjustment due to worsening diabetes control.  - Increased long-acting insulin from 10 to 12 units at bedtime, with potential further increase to 14 or 16 units based on morning blood sugar readings.  - Adjusted sliding scale insulin to start at 175 mg/dL instead of 200 mg/dL.  - Recommend using a Dexcom continuous glucose monitor if covered by insurance to better manage blood sugar and insulin dosing.  - Provided dietary recommendations including reducing sugar intake, limiting fruit consumption, and incorporating more complex carbohydrates and protein throughout the day.  There is significant room for dietary improvement  - Explained relationship between carbohydrate intake and glucose levels.  Also discussed different types of carbohydrate effects on glycemic control  - Discussed importance of balancing protein and complex  carbohydrates to maintain stable blood sugar.  - Educated on potential benefits of reducing sugar intake, including improved energy levels.  - Continued Jardiance.  - Instructed the patient to contact the office if morning glucose readings consistently remain above 150-170 mg/dL after 3-4 weeks on new insulin regimen.  - Advised to follow up with Juanita (office staff) for any questions about insulin dosing.  - Increased long-acting insulin from 10 to 12 units at bedtime.  - Adjusted sliding scale insulin: administer 2 units if glucose is over 175 mg/dL before meals.  - Instructed potential further increase to 14 or 16 units based on morning blood sugar readings.  - Ordered Dexcom continuous glucose monitor, pending insurance approval, to better manage blood sugar and insulin dosing.  - Reduce overall sugar intake, especially desserts and elevated-sugar fruits.  - Incorporate more complex carbohydrates and protein into meals.  - Space out meals more evenly throughout the day.  - Consider replacing some fruit snacks with protein or complex carbohydrate options (e.g., crackers with cheese or peanut butter).    HYPERLIPIDEMIA:  - Reviewed hyperlipidemia, a chronic condition, during the visit.  - Noted the condition is relatively stable.  - Continued Pravastatin 20 mg along with recommendations for reasonable healthy nutrition.    BONE HEALTH:  - Continued Fosamax for bone health.         CODING, ORDERS, AND ADDITIONAL DOCUMENTATION RELATED TO THIS ENCOUNTER:  (note that the diagnoses and clinical summaries above were generated with the assistance of [employer] software and represents my assessment and plan.  This software does not always generate the precise nor most specific diagnosis codes.  Therefore the specific diagnosis codes and orders for billing purposes are detailed below along with any additional documentation if needed)      1. Type 2 diabetes mellitus with diabetic polyneuropathy, with long-term  current use of insulin  -     blood-glucose meter,continuous (DEXCOM G7 ) Misc; Check glucose 4 TO 6 times daily  Dispense: 1 each; Refill: PRN  -     blood-glucose sensor (DEXCOM G7 SENSOR) Winsome; Check glucose 4 to 6 times daily. Change sensor every 10 days  Dispense: 3 each; Refill: 11  -     Hemoglobin A1C; Future; Expected date: 02/24/2025  -     Comprehensive Metabolic Panel; Future; Expected date: 02/24/2025    2. CKD stage 3 due to type 2 diabetes mellitus    3. Mixed hyperlipidemia    4. Chronic kidney disease, stage 3a    Other orders  -     insulin glargine-yfgn (SEMGLEE,INSULIN GLARG-YFGN,PEN) 100 unit/mL (3 mL) InPn; Inject 12 Units into the skin every evening.  Dispense: 3 mL; Refill: 5           Follow Up:  Three months but phone check in four weeks    Subjective:       HPI    Patient is a/an 85 y.o.  male     For complete problem list, past medical history, surgical history, social history, etc., see appropriate section in the electronic medical record    History of Present Illness    CHIEF COMPLAINT:  Mr. Cespedes presents today for follow up of diabetes.    DIABETES MANAGEMENT:  His A1c has increased to 8.4 from 7.2, with an average glucose of approximately 195 mg/dL over the last 3 months. He was started on insulin after surgery and hospitalization. Current regimen includes 10 units of long-acting insulin at bedtime and sliding scale insulin for two meals per day. Morning blood sugars are typically below 200 mg/dL, while evening and lunch readings are frequently in the 200s mg/dL range. He denies hypoglycemic episodes with current regimen. He takes Jardiance to help reduce insulin requirements.    DIET:  He consumes one cookie or small cup of ice cream daily. His carbohydrate intake includes wheat bread, primarily white pasta and white rice, though brown rice is available but rarely used. He frequently consumes fruit.  Increase desserts recently especially during carnival  "season    SLEEP:  He reports approximately 15 hours of sleep daily, from 9 PM to noon.          Review of Systems   Constitutional:  Positive for fatigue. Negative for activity change and unexpected weight change.   HENT:  Negative for hearing loss, rhinorrhea and trouble swallowing.    Eyes:  Negative for discharge.   Respiratory:  Negative for chest tightness and wheezing.    Cardiovascular:  Negative for chest pain and palpitations.   Gastrointestinal:  Negative for blood in stool, constipation, diarrhea and vomiting.   Endocrine: Negative for polyuria.   Genitourinary:  Negative for difficulty urinating, hematuria and urgency.   Musculoskeletal:  Negative for arthralgias, joint swelling and neck pain.   Neurological:  Negative for weakness and headaches.   Psychiatric/Behavioral:  Positive for dysphoric mood. Negative for confusion and suicidal ideas.        Objective     Physical Exam  Vitals reviewed.   Constitutional:       General: He is not in acute distress.     Appearance: He is well-developed.   HENT:      Head: Normocephalic and atraumatic.   Eyes:      General: No scleral icterus.     Conjunctiva/sclera: Conjunctivae normal.   Cardiovascular:      Rate and Rhythm: Normal rate and regular rhythm.      Heart sounds: Murmur heard.   Pulmonary:      Effort: Pulmonary effort is normal. No respiratory distress.      Breath sounds: No wheezing or rales.   Skin:     General: Skin is warm and dry.   Neurological:      Mental Status: He is alert.      Deep Tendon Reflexes: Reflexes are normal and symmetric.      Comments: Patient is ambulating with a walker.  He is still very unsteady and weak.   Psychiatric:         Behavior: Behavior normal.       Vitals:    02/24/25 1443   BP: 106/68   BP Location: Right arm   Patient Position: Sitting   Pulse: 61   SpO2: 98%   Weight: 66.2 kg (145 lb 13.4 oz)   Height: 5' 8" (1.727 m)     Physical Exam                This note was generated with the assistance of [employer] " listening technology. Verbal consent was obtained by the patient and accompanying visitor(s) for the recording of patient appointment to facilitate this note. I attest to having reviewed and edited the generated note for accuracy, though some syntax or spelling errors may persist. Please contact the author of this note for any clarification.  Parts of the note were also generated with voice recognition software.  Occasionally this software will misinterpreted words or phrases

## 2025-03-01 DIAGNOSIS — F33.41 RECURRENT MAJOR DEPRESSIVE DISORDER, IN PARTIAL REMISSION: Chronic | ICD-10-CM

## 2025-03-01 DIAGNOSIS — R45.89 DEPRESSED MOOD: ICD-10-CM

## 2025-03-01 DIAGNOSIS — I10 HYPERTENSION, UNSPECIFIED TYPE: Primary | ICD-10-CM

## 2025-03-03 ENCOUNTER — TELEPHONE (OUTPATIENT)
Dept: DIABETES | Facility: CLINIC | Age: 85
End: 2025-03-03
Payer: MEDICARE

## 2025-03-03 DIAGNOSIS — Z79.4 TYPE 2 DIABETES MELLITUS WITH DIABETIC NEUROPATHY, WITH LONG-TERM CURRENT USE OF INSULIN: Primary | ICD-10-CM

## 2025-03-03 DIAGNOSIS — E11.40 TYPE 2 DIABETES MELLITUS WITH DIABETIC NEUROPATHY, WITH LONG-TERM CURRENT USE OF INSULIN: Primary | ICD-10-CM

## 2025-03-03 RX ORDER — LISINOPRIL 2.5 MG/1
2.5 TABLET ORAL DAILY
Qty: 30 TABLET | Refills: 1 | Status: SHIPPED | OUTPATIENT
Start: 2025-03-03 | End: 2025-05-02

## 2025-03-03 RX ORDER — ESCITALOPRAM OXALATE 10 MG/1
10 TABLET ORAL DAILY
Qty: 90 TABLET | Refills: 3 | Status: SHIPPED | OUTPATIENT
Start: 2025-03-03 | End: 2026-03-03

## 2025-03-03 RX ORDER — METOPROLOL TARTRATE 25 MG/1
12.5 TABLET, FILM COATED ORAL 2 TIMES DAILY
Qty: 30 TABLET | Refills: 1 | Status: SHIPPED | OUTPATIENT
Start: 2025-03-03 | End: 2025-05-02

## 2025-03-06 ENCOUNTER — CLINICAL SUPPORT (OUTPATIENT)
Dept: DIABETES | Facility: CLINIC | Age: 85
End: 2025-03-06
Payer: MEDICARE

## 2025-03-06 VITALS — HEIGHT: 68 IN | BODY MASS INDEX: 22.12 KG/M2 | WEIGHT: 145.94 LBS

## 2025-03-06 DIAGNOSIS — E11.40 TYPE 2 DIABETES MELLITUS WITH DIABETIC NEUROPATHY, WITH LONG-TERM CURRENT USE OF INSULIN: ICD-10-CM

## 2025-03-06 DIAGNOSIS — Z79.4 TYPE 2 DIABETES MELLITUS WITH DIABETIC NEUROPATHY, WITH LONG-TERM CURRENT USE OF INSULIN: ICD-10-CM

## 2025-03-06 PROCEDURE — 99999 PR PBB SHADOW E&M-EST. PATIENT-LVL II: CPT | Mod: PBBFAC,,, | Performed by: DIETITIAN, REGISTERED

## 2025-03-06 PROCEDURE — G0108 DIAB MANAGE TRN  PER INDIV: HCPCS | Mod: S$GLB,,, | Performed by: DIETITIAN, REGISTERED

## 2025-03-06 NOTE — PROGRESS NOTES
"Diabetes Care Specialist Progress Note  Author: Joslyn Olivarez RD, CDE  Date: 3/6/2025    Intake    Program Intake  Reason for Diabetes Program Visit:: Intervention- Patient's wife called requesting appointment to help get the Dexcom G7 set up for her .  This was prescribed by his PCP and he currently does not see Endocrinology.    Type of Intervention:: Individual  Individual: Device Training  Device Training: Personal CGM  Current diabetes risk level:: moderate  In the last month, have you used the ER or been admitted to the hospital: Yes  Was the ER or hospital admission related to diabetes?: No  Permission to speak with others about care:: yes    Current Diabetes Treatment: Insulin, Oral Medications  Oral Medication Type/Dose:  Jardiance  Method of insulin delivery?: Injections  Injection Type: Pens  Pen Type/Dose:  Semglee 12 units daily, Novolog ss    Continuous Glucose Monitoring  Patient has CGM: No-here today to initiate Dexcom G7    Lab Results   Component Value Date    HGBA1C 8.4 (H) 01/13/2025     Weight: 66.2 kg (145 lb 15.1 oz)   Height: 5' 8" (172.7 cm)   Body mass index is 22.19 kg/m².    Diabetes Self-Management Skills Assessment    Medication Skills Assessment  Patient is able to identify current diabetes medications, dosages, and appropriate timing of medications.: yes  Patient reports problems or concerns with current medication regimen.: no  Patient is  aware that some diabetes medications can cause low blood sugar?: Yes  Medication Skills Assessment Completed:: Yes  Assessment indicates:: Adequate understanding  Area of need?: No    Physical Activity/Exercise  Patient's daily activity level:: sedentary  Patient formally exercises outside of work.: no  Reasons for not exercising:: physically unable to exercise currently  Physical Activity/Exercise Skills Assessment Completed: : Yes  Area of need?: No    Home Blood Glucose Monitoring  Patient states that blood sugar is checked at home " daily.: yes  Monitoring Method:: home glucometer/ will transition to cgm/ keeps logs currently but did not have them in clinic today/ wife states blood sugars are variable ranging from 150-350  How often do you check your blood sugar?:  3 times daily  Home Blood Glucose Monitoring Skills Assessment Completed: : Yes  Assessment indicates:: Instruction Needed  Area of need?: Yes    Assessment Summary and Plan    Based on today's diabetes care assessment, the following areas of need were identified:      Identified Areas of Need      Medication/Current Diabetes Treatment: No   Lifestyle Coping/Support:     Diabetes Disease Process/Treatment Options:     Nutrition/Healthy Eating:      Physical Activity/Exercise: No    Home Blood Glucose Monitoring: Yes - see care plan/ Dexcom G7 set up completed today   Acute Complications:      Chronic Complications:       Today's interventions were provided through individual discussion, instruction, and written materials were provided.      Patient verbalized understanding of instruction and written materials.  Pt was able to return back demonstration of instructions today. Patient understood key points, needs reinforcement and further instruction.     Diabetes Self-Management Care Plan:    Today's Diabetes Self-Management Care Plan was developed with Cecilio's input. Cecilio has agreed to work toward the following goal(s) to improve his/her overall diabetes control.      Care Plan: Diabetes Management   Updates made since 3/6/2024 12:00 AM        Problem: Blood Glucose Self-Monitoring         Long-Range Goal: Patient will transition to Dexcom G7 cgm.    Start Date: 3/6/2025   Priority: High   Barriers: No Barriers Identified   Note:    PLACEMENT OF DEXCOM G7 PERSONAL CONTINOUS GLUCOSE MONITORING SYSTEM (CGMS)     REFERRING PROVIDER: Dr. West    Explained to patient the difference between sensor glucose and blood (capillary) glucose and how these 2 readings may not be the  "same.     Patient is here in clinic today for placement of personal continuous glucose monitoring system (CGMS).   Patient has Dexcom G7 , Sensors, & Transmitter. Patient will use the  to obtain continuous glucose readings. Patients wife will assist with placing sensors.  Patient verbalizes understanding to change sensor every 10 days. He is also aware that each time a new sensor is placed there is a 30 min warm up period. No calibration is necessary however patient can calibrate if he desires.  However, if patient calibrates Dexcom sensor, it is recommended to only calibrate once during the 10-day sensor wear as sensor is factory calibrated. Patient understands to avoid calibration when glucose levels changing rapidly.      Discussed Dexcom G7 supplies with patient--company/pharmacy to reorder items and who to call (Dexcom technical support) if a sensor/transmitter fails.     A detailed explanation of Dexcom G7 Continuous Glucose Monitoring System was provided. Reviewed the Dexcom "Getting Started Guide" with patient and he has a written copy for home use.  Patient was allowed to practice, and time allowed to ask questions. Patient will notify Endocrinology if glucose levels are above 250 mg/dL consistently or if episode of glucose less than 70 mg/dL presents. Patient verbalizes understanding.         High alert set at OFF- will start with it off as patients wife states it can get as high as 300-400 on occasion  Low alert set at 70 mg/dL  High rising alert: OFF  Low dropping alert: OFF  Signal Loss: ON- 20 min  Brief Sensor Issue: ON    Dexcom username: n/a     An appropriate site was selected and prepared. Patient inserted sensor into right arm. Sensor will be changed by patient every 10 days.  Patient will be in the clinic next Wednesday for an eye appointment and will plan to download reader and forward reports to Dr. West to assess need for insulin adjustments as needed.         Follow Up " Plan     Follow up in about 1 week (around 3/13/2025) for Dexcom reader download.  Patients wife also plans to continue to keep manual logs for PCP f/u visits but can assist with downloads in the future as needed as well.  Dexcom customer support number and clinic number provided and encouraged patient to call with any questions/concerns in interim.      Today's care plan and follow up schedule was discussed with patient.  Cecilio verbalized understanding of the care plan, goals, and agrees to follow up plan.        The patient was encouraged to communicate with his/her health care provider/physician and care team regarding his/her condition(s) and treatment.  I provided the patient with my contact information today and encouraged to contact me via phone or [facility] Patient Portal as needed.     Length of Visit   Total Time: 60 Minutes

## 2025-03-12 ENCOUNTER — CLINICAL SUPPORT (OUTPATIENT)
Dept: DIABETES | Facility: CLINIC | Age: 85
End: 2025-03-12
Payer: MEDICARE

## 2025-03-12 ENCOUNTER — OFFICE VISIT (OUTPATIENT)
Dept: OPTOMETRY | Facility: CLINIC | Age: 85
End: 2025-03-12
Payer: MEDICARE

## 2025-03-12 DIAGNOSIS — E11.9 TYPE 2 DIABETES MELLITUS WITHOUT RETINOPATHY: Primary | ICD-10-CM

## 2025-03-12 DIAGNOSIS — Z96.1 PSEUDOPHAKIA, LEFT EYE: ICD-10-CM

## 2025-03-12 DIAGNOSIS — Z79.4 TYPE 2 DIABETES MELLITUS WITH DIABETIC NEUROPATHY, WITH LONG-TERM CURRENT USE OF INSULIN: Primary | ICD-10-CM

## 2025-03-12 DIAGNOSIS — H25.11 NUCLEAR SCLEROSIS OF RIGHT EYE: ICD-10-CM

## 2025-03-12 DIAGNOSIS — E11.40 TYPE 2 DIABETES MELLITUS WITH DIABETIC NEUROPATHY, WITH LONG-TERM CURRENT USE OF INSULIN: Primary | ICD-10-CM

## 2025-03-12 PROCEDURE — 99999 PR PBB SHADOW E&M-EST. PATIENT-LVL III: CPT | Mod: PBBFAC,,, | Performed by: OPTOMETRIST

## 2025-03-12 PROCEDURE — 1160F RVW MEDS BY RX/DR IN RCRD: CPT | Mod: CPTII,S$GLB,, | Performed by: OPTOMETRIST

## 2025-03-12 PROCEDURE — 2023F DILAT RTA XM W/O RTNOPTHY: CPT | Mod: CPTII,S$GLB,, | Performed by: OPTOMETRIST

## 2025-03-12 PROCEDURE — 1159F MED LIST DOCD IN RCRD: CPT | Mod: CPTII,S$GLB,, | Performed by: OPTOMETRIST

## 2025-03-12 PROCEDURE — 92014 COMPRE OPH EXAM EST PT 1/>: CPT | Mod: S$GLB,,, | Performed by: OPTOMETRIST

## 2025-03-12 PROCEDURE — 1126F AMNT PAIN NOTED NONE PRSNT: CPT | Mod: CPTII,S$GLB,, | Performed by: OPTOMETRIST

## 2025-03-12 NOTE — PROGRESS NOTES
HPI     Diabetic Eye Exam            Comments: Hemoglobin A1C       Date                     Value               Ref Range             Status                01/13/2025               8.4 (H)             4.0 - 5.6 %           Final                        Comments    Pt states : here for dme , ldme 08/07/2023 /   PCIOL OS   Pt feels vision is fine , just here  for annual diabetic check  Does not wear dva correction & does not feel he needs it , wears occass   otc readers   No gtts   Denies F/F          Last edited by Monica Domingo on 3/12/2025  2:22 PM.            Assessment /Plan     For exam results, see Encounter Report.    Type 2 diabetes mellitus without retinopathy    Nuclear sclerosis of right eye    Pseudophakia, left eye      No diabetic retinopathy, no csme. Return in 1 year for dilated eye exam.  2. Educated pt on presence of cataracts and effects on vision. No surgery at this time. Recheck in one year.  3. Monitor condition. Patient to report any changes. RTC 1 year recheck.

## 2025-03-12 NOTE — Clinical Note
Per your note on 2/24--patient instructed to increase Semglee tonight from 12 units to 14 units as new to Dexcom CGM and reveals glucose levels globally elevated. He is to continue Jardiance and Novolog sliding/correction scale. Encouraged to reach out in 1 week if fasting glucose levels remain > 200 mg/dL.

## 2025-03-12 NOTE — PROGRESS NOTES
Diabetes Care Specialist Follow-up Note  Author: Elzbieta Javed RD, CDE  Date: 3/12/2025    Intake    Program Intake  Reason for Diabetes Program Visit:: Intervention  Type of Intervention:: Individual  Individual: Education  Education: Pattern Management  Current diabetes risk level:: high  In the last month, have you used the ER or been admitted to the hospital: No  Permission to speak with others about care:: yes (spouse Karrie drives patient to appointments and is caregiver)    Current Diabetes Treatment: Insulin, Oral Medications  Oral Medication Type/Dose: Jardiance 10 mg daily  Method of insulin delivery?: Injections  Injection Type: Pens  Pen Type/Dose: Semglee 12 units QHS (takes at 9pm nightly), Novolog sliding scale TID before meals when glucose > 175 mg/dL (175/25)    Continuous Glucose Monitoring  Patient has CGM: Yes  Personal CGM type:: Dexcom G7 (uses )  GMI Date:  (only 7 days CGM data--not enough to calculate GMI)    Lab Results   Component Value Date    HGBA1C 8.4 (H) 01/13/2025     Physical activity/Exercise:   Patient ambulates with walker or wheelchair. Sedentary.     Glucose Monitoring: Dexcom G7  download (3/6/2025 to 3/12/2025): See media file for full report. 0% of glucose values are in target range. Message sent to PCP about elevated glucose levels. Glucose levels globally elevated and patient to increase basal insulin per PCP. No episodes of hypoglycemia noted in the download. First sensor change will be 3/16/25 and walked patient's spouse through process of sensor change.      Diabetes Self-Management Skills Assessment    Medication Skills Assessment  Patient is able to identify current diabetes medications, dosages, and appropriate timing of medications.: yes  Patient reports problems or concerns with current medication regimen.: yes  Medication regimen problems/concerns:: does not feel like regimen is working  Patient is  aware that some diabetes medications can cause  low blood sugar?: Yes  Medication Skills Assessment Completed:: Yes  Assessment indicates:: Instruction Needed  Area of need?: Yes    Home Blood Glucose Monitoring  Personal CGM type:: Dexcom G7 (uses )    During today's follow-up visit,  the following areas required further assessment and content was provided/reviewed.    Based on today's diabetes care assessment, the following areas of need were identified:      Identified Areas of Need      Medication/Current Diabetes Treatment: Yes--see care plan     Today's interventions were provided through individual discussion, instruction, and written materials were provided.    Patient verbalized understanding of instruction and written materials.  Pt was able to return back demonstration of instructions today. Patient understood key points, needs reinforcement and further instruction.     Diabetes Self-Management Care Plan Review and Evaluation of Progress:    During today's follow-up Cecilio's Diabetes Self-Management Care Plan progress was reviewed and progress was evaluated including his/her input. Cecilio has agreed to continue his/her journey to improve/maintain overall diabetes control by continuing to set health goals. See care plan progress below.      Care Plan: Diabetes Management   Updates made since 3/12/2024 12:00 AM     Problem: Blood Glucose Self-Monitoring         Long-Range Goal: Patient will transition to Dexcom G7 cgm.    Start Date: 3/6/2025   This Visit's Progress: On track   Priority: High   Barriers: No Barriers Identified   Note:    3/12/25: Dexcom G7  downloaded today and CGM data reviewed. Average glucose over past 7 days is 266 mg/dL with glucose time in target range 0%.  Per 2/24/25 PCP note (Dr. West), patient to consider increase in Semglee if fasting glucose levels not < 200 mg/dL.  Instructions provided to patient and spouse and will copy PCP on message as well.      First sensor change scheduled for 3/16/25 and walked  spouse through menu on  for sensor change and choosing new site for sensor and entering new sensor pairing code.  She will call with any issues with first sensor change.     High alert set at OFF- will start with it off as patients wife states it can get as high as 300-400 on occasion  Low alert set at 70 mg/dL  High rising alert: OFF  Low dropping alert: OFF  Signal Loss: ON- 20 min  Brief Sensor Issue: ON--turned OFF at 3/12/25 due to frequent brief sensor issue alarms    Dexcom username: n/a (uses )          Problem: Medications         Goal: Continue Jardiance and Novolog sliding/correction scale before meals (Target glucose 175, ISF 25). Increase Semglee from 12 units to 14 units QHS per PCP note on 2/24/25 as fasting glucose levels not < 200 mg/dL.    Start Date: 3/12/2025   Expected End Date: 6/12/2025   Priority: Medium   Barriers: No Barriers Identified        Task: Reviewed with patient all current diabetes medications and provided basic review of the purpose, dosage, frequency, side effects, and storage of both oral and injectable diabetes medications. Completed 3/12/2025        Task: Instructed patient on how to self-administer Novolog using sliding/correction scale. Encouraged to date Novolog insulin pen when opening and discard after 30 days. Completed 3/12/2025   Note:    Correction scale to be used with rapid acting insulin only (Novolog, Humalog, etc) based on glucose levels BEFORE meals as directed:    175-225 mg/dL =  + 2 units  226-275 mg/dL =  + 4 units  276-325 mg/dL =  + 6 units  326-375 mg/dL =  + 8 units  > 375 mg/dL =  + 10 units          Task: Discussed guidelines for preventing, detecting and treating hypoglycemia and hyperglycemia and reviewed the importance of meal and medication timing with diabetes mediations for prevention of hypoglycemia and maximum drug benefit. Completed 3/12/2025        Follow Up Plan     Follow up in about 3 months (around 6/12/2025) for  continued DSMES if diabetes control does not improve or for assistance with use of Dexcom G7. Reviewed how to change sensor as spouse will attempt first sensor change on patient 3/16/25. Current glucose levels globally elevated per CGM report reviewed today--per PCP note at recent 2/24/25 visit if glucose levels were not improving, increase Semglee to 14 units nightly (increased from 12 units). Continue Jardiance 10 mg daily and Novolog sliding/correction scale before meals if needed (spouse reports needing at least 2 units of Novolog prior to each meal). Encouraged to contact PCP if fasting glucose levels remain greater than 200 mg/dL in 1 week after increasing Semglee. Do not need tight glycemic control--avoid hypoglycemia due to age and medical history.  Goal Hgb A1c < 8%.     Today's care plan and follow up schedule was discussed with patient.  Cecilio verbalized understanding of the care plan, goals, and agrees to follow up plan.        The patient was encouraged to communicate with his/her health care provider/physician and care team regarding his/her condition(s) and treatment.  I provided the patient with my contact information today and encouraged to contact me via phone or [facility] Patient Portal as needed.     Length of Visit   Total Time: 45 Minutes

## 2025-03-12 NOTE — PATIENT INSTRUCTIONS
Increase Semglee (injection) from 12 units to 14 units nightly.    Continue Jardiance (pill) each morning    Novolog sliding/correction scale before meals. Correction scale to be used with rapid acting insulin only (Novolog) based on glucose levels BEFORE meals as directed:    175-225 mg/dL =  + 2 units  226-275 mg/dL =  + 4 units  276-325 mg/dL =  + 6 units  326-375 mg/dL =  + 8 units  > 375 mg/dL =  + 10 units     If fasting glucose continue to be greater than 200 mg/dL, please notify Dr. West in 1 week.    Please write date on Novolog pen when initiating use (putting insulin pen needle on for first time)--discard insulin pen after 30 days even if insulin remaining in pen.

## 2025-03-13 ENCOUNTER — TELEPHONE (OUTPATIENT)
Dept: PRIMARY CARE CLINIC | Facility: CLINIC | Age: 85
End: 2025-03-13
Payer: MEDICARE

## 2025-03-13 NOTE — TELEPHONE ENCOUNTER
----- Message from Leyla sent at 3/13/2025  3:48 PM CDT -----  Regarding: Patient Call back  Patient wife called requesting to speak with staff of Dr. West.  She did not state what she wanted, though.  Please call this patient back for further info.

## 2025-03-20 ENCOUNTER — TELEPHONE (OUTPATIENT)
Dept: PHARMACY | Facility: CLINIC | Age: 85
End: 2025-03-20
Payer: MEDICARE

## 2025-03-20 NOTE — TELEPHONE ENCOUNTER
[facility]/Population Health Chart Review & Patient Outreach Details For Medication Adherence Project    Reason for Outreach Encounter: 3rd Party payor non-compliance report ([employer], [employer], C, etc)  2.  Patient Outreach Method: Reviewed Patient Chart  3.   Medication in question: lisinopril   LAST FILLED: 3/10/25 for 60 day supply  Hypertension Medications              lisinopriL (PRINIVIL,ZESTRIL) 2.5 MG tablet Take 1 tablet (2.5 mg total) by mouth once daily.    metoprolol tartrate (LOPRESSOR) 25 MG tablet Take one-half tablet (12.5 mg total) by mouth 2 (two) times daily.              4.  Reviewed and or Updates Made To: Patient Chart  5. Outreach Outcomes and/or actions taken: Patient filled medication and is on track to be adherent

## 2025-03-24 ENCOUNTER — TELEPHONE (OUTPATIENT)
Dept: PRIMARY CARE CLINIC | Facility: CLINIC | Age: 85
End: 2025-03-24
Payer: MEDICARE

## 2025-03-24 NOTE — TELEPHONE ENCOUNTER
----- Message from Rosa sent at 3/24/2025  9:46 AM CDT -----  Regarding: pt advice - appointment info and medication  [phone] - call back wife Karrie ; she have a question about the appointment for tomorrow also about his diabetes medicationAnnabelle

## 2025-03-24 NOTE — TELEPHONE ENCOUNTER
Spoke to Karrie, Mr Henry's wife. She was just wondering about the visit tomorrow and why they needed it.  Explained to her that they did not need to come in. A nurse would be calling her to discuss the patients diabetes.

## 2025-03-25 ENCOUNTER — TELEPHONE (OUTPATIENT)
Dept: PRIMARY CARE CLINIC | Facility: CLINIC | Age: 85
End: 2025-03-25
Payer: MEDICARE

## 2025-03-25 RX ORDER — INSULIN ASPART 100 [IU]/ML
INJECTION, SOLUTION INTRAVENOUS; SUBCUTANEOUS
Qty: 15 ML | Refills: 11 | Status: SHIPPED | OUTPATIENT
Start: 2025-03-25

## 2025-03-25 RX ORDER — INSULIN GLARGINE-YFGN 100 [IU]/ML
12 INJECTION, SOLUTION SUBCUTANEOUS NIGHTLY
Qty: 3 ML | Refills: 5 | Status: CANCELLED | OUTPATIENT
Start: 2025-03-25

## 2025-03-25 RX ORDER — INSULIN GLARGINE-YFGN 100 [IU]/ML
18 INJECTION, SOLUTION SUBCUTANEOUS NIGHTLY
Qty: 15 ML | Refills: 5 | Status: SHIPPED | OUTPATIENT
Start: 2025-03-25

## 2025-03-25 NOTE — TELEPHONE ENCOUNTER
Spoke with pt's wife, discussed your message, she verbalized understanding.  Message sent via portal with all details of the previous message.  Pt's wife would like to know if she is to discontinue the sliding scale.     Please advise.

## 2025-03-25 NOTE — TELEPHONE ENCOUNTER
Called pt's wife to see how pt's glucose levels have been running at home.  Before pt started using the Dexcom, his glucose levels were lower.  She reports that pt is not eating any more than he was in the past and is eating the same foods.  Pt has been sleeping all of the time and is only waking up to eat.     Readings are listed in order of breakfast (noon), lunch (3 pm), dinner (6 pm), and bedtime (9 pm)    2/24 152, 237, 313  2/25 228, 261, 262  2/26 195, 272, 288  2/27 162, 281, 215  2/28 183, 243, 289  3/1 175, 273, 435  3/2 159, 215,305  3/3 202, 241, 276  3/4 270, 214, 254  3/5 171, 198    Pt got Dexcom on 3/6/25.  Pt is taking 14 units of Semglee and this started on 3/6 as well.    3/6 181, 229, 295, 365 (before bedtime)  3/7 205, 313, 349, 283  3/8 195, 258, 285   3/9 211, 301, 310, 376  3/10 226, 306, 372   3/11 206, 305, 320, 332  3/12 213, 260, 303, 335  3/13 216, HIGH (took 10 units of Novolog (insulin aspart) at that time and came down to 356) 267  3/14 212, 271, 288, 320  3/15 211, 257, 339   3/16 209, 294, 277, 275  3/17 changed Dexcom 197, 382, 281, 337  3/18 262, 259, 329, 329  3/19 241, 313, 355, 307  3/20 252, 353, 319, 341  3/21 240, 259   3/22 230, HIGH (gave 10 units of Novolog), 393, 295  3/23 240, 345, 308, 360  3/24 239, 357, 332, 345  3/25 258     Reports that pt was in the 100s before breakfast in February and that his numbers have increased since starting Dexcom.      Pt's wife is inquiring if pt needs to eat 3 times daily as there are days that he does not want to eat lunch.  If he skips a meal, should he skip his insulin?  Please advise.     Pt wakes up around 1200, checks glucose, takes insulin and eats breakfast at this time.  Eats lunch at 3 pm which includes one root beer.  Pt eats dinner at 6pm.  Pt takes Semglee at 9pm.     Additionally, Novolog Rx was discontinued on 3/19/25 and this appears to have been done in error.  New Rx pended.

## 2025-03-25 NOTE — TELEPHONE ENCOUNTER
Thank his wife for the update.  I made some adjustments as follows    Insulin glargine (basal/long-acting)  Take 18 units once daily in the evening.  This is an increase from 14 units    I simplified to mealtime insulin and adjusted    He will take 6 units novolog 3 times a day with a meal.  If glucose is greater then 200 before the meal he will take 10 units instead of 6.

## 2025-03-26 NOTE — TELEPHONE ENCOUNTER
The new mealtime dosing will replace the previous sliding scale.  Although if he has any isolated significant elevations in between meals she could still use this if needed.

## 2025-03-26 NOTE — TELEPHONE ENCOUNTER
Spoke with pt's wife, discussed your message, she verbalized understanding.  Pt's wife would like an appt with an RD.  Message sent to Elzbieta Javed RD to see if she can assist with getting the pt an appt to discuss his diet.  Advised pt's wife that I would reach out to her once I heard back from Elzbieta, she verbalized understanding.

## 2025-03-27 NOTE — TELEPHONE ENCOUNTER
Received a message from Elzbieta that patient is scheduled for DM education 4/3 at 1:30pm for diet. She will also download Dexcom  while patient is at the visit.     Spoke with pt's wife and she states that she will bring the  to the appt scheduled with Elzbieta next week on 4/3 at 1:30.

## 2025-04-03 ENCOUNTER — NUTRITION (OUTPATIENT)
Dept: DIABETES | Facility: CLINIC | Age: 85
End: 2025-04-03
Payer: MEDICARE

## 2025-04-03 DIAGNOSIS — E11.40 TYPE 2 DIABETES MELLITUS WITH DIABETIC NEUROPATHY, WITH LONG-TERM CURRENT USE OF INSULIN: Primary | ICD-10-CM

## 2025-04-03 DIAGNOSIS — Z79.4 TYPE 2 DIABETES MELLITUS WITH DIABETIC NEUROPATHY, WITH LONG-TERM CURRENT USE OF INSULIN: Primary | ICD-10-CM

## 2025-04-03 DIAGNOSIS — E11.42 TYPE 2 DIABETES MELLITUS WITH DIABETIC POLYNEUROPATHY, WITH LONG-TERM CURRENT USE OF INSULIN: Primary | ICD-10-CM

## 2025-04-03 DIAGNOSIS — Z79.4 TYPE 2 DIABETES MELLITUS WITH DIABETIC POLYNEUROPATHY, WITH LONG-TERM CURRENT USE OF INSULIN: Primary | ICD-10-CM

## 2025-04-03 PROCEDURE — G0108 DIAB MANAGE TRN  PER INDIV: HCPCS | Mod: S$GLB,,, | Performed by: DIETITIAN, REGISTERED

## 2025-04-03 RX ORDER — PEN NEEDLE, DIABETIC 30 GX3/16"
1 NEEDLE, DISPOSABLE MISCELLANEOUS 4 TIMES DAILY
Qty: 100 EACH | Refills: 3 | Status: SHIPPED | OUTPATIENT
Start: 2025-04-03

## 2025-04-03 NOTE — PROGRESS NOTES
Diabetes Care Specialist Follow-up Note  Author: Elzbieta Javed RD, [facility]– [place]  Date: 4/3/2025    Intake    Program Intake  Reason for Diabetes Program Visit:: Intervention  Type of Intervention:: Individual  Individual: Education  Education: Nutrition and Meal Planning  Current diabetes risk level:: high  Permission to speak with others about care:: yes (wife Karrie is with patient today)    Current Diabetes Treatment: Insulin  Oral Medication Type/Dose: Jardiance 10 mg  Method of insulin delivery?: Injections  Injection Type: Pens  Pen Type/Dose: Semglee 18 units QHS, Novolog 6 units AC (if premeal glucose > 200 increase Novolog to 10 units)--taking 10 units Novolog before each meal as glucose consistently > 200 mg/dL    Continuous Glucose Monitoring  Patient has CGM: Yes  Personal CGM type:: Dexcom G7 (uses )  GMI Date: 04/03/25  GMI Value: 10.4 %    Lab Results   Component Value Date    HGBA1C 8.4 (H) 01/13/2025     Glucose monitoring:  Dexcom G7  download (3/21/2025 to 4/3/2025): See media file for full report. 0% of glucose values are in target range--PCP notified and insulin doses increased today due to global hyperglycemia. Spouse will work to decrease portions of carbs at meals. No episodes of hypoglycemia noted in the download.          Diabetes Self-Management Skills Assessment    Medication Skills Assessment  Patient is able to identify current diabetes medications, dosages, and appropriate timing of medications.: yes  Patient reports problems or concerns with current medication regimen.: yes  Medication regimen problems/concerns:: does not feel like regimen is working  Patient is  aware that some diabetes medications can cause low blood sugar?: Yes  Medication Skills Assessment Completed:: Yes  Assessment indicates:: Instruction Needed  Area of need?: Yes    Nutrition/Healthy Eating  Meal Plan 24 Hour Recall - Breakfast: boiled egg, wheat toast with grape jelly OR cornflakes + milk + fruit  cup  Meal Plan 24 Hour Recall - Lunch: crackers + cheese, banana, 12 fl oz can root beer  Meal Plan 24 Hour Recall - Dinner: 2 hash brown patties, green beans, salmon, small ice cream sandwich  Meal Plan 24 Hour Recall - Snack: grapes, cookies  Meal Plan 24 Hour Recall - Beverage: water, unsweet tea, coffee (1 cup daily), 1 can (12 fl oz) root beer daily (this is decreased from multiple cans daily)  Who shops/cooks?: Spouse grocery shops and prepares all meals for patient  Patient can identify foods that impact blood sugar.: no (see comments) (confusion on which foods contain carbs)  Challenges to healthy eating:: portion control, snacking between meals and at night  Nutrition/Healthy Eating Skills Assessment Completed:: Yes  Assessment indicates:: Instruction Needed, Knowledge deficit  Area of need?: Yes    Home Blood Glucose Monitoring  Patient states that blood sugar is checked at home daily.: yes  Monitoring Method:: personal continuous glucose monitor  Personal CGM type:: Dexcom G7 (uses )   What is your current Time in Range?: 0%  What is your A1c Target?: <8% without hypoglycemia  Home Blood Glucose Monitoring Skills Assessment Completed: : Yes  Assessment indicates:: Adequate understanding  Area of need?: No    During today's follow-up visit,  the following areas required further assessment and content was provided/reviewed.    Based on today's diabetes care assessment, the following areas of need were identified:      Identified Areas of Need      Medication/Current Diabetes Treatment: Yes --see care plan   Nutrition/Healthy Eating: Yes --see care plan   Home Blood Glucose Monitoring: No      Today's interventions were provided through individual discussion, instruction, and written materials were provided.    Patient verbalized understanding of instruction and written materials.  Pt was able to return back demonstration of instructions today. Patient understood key points, needs reinforcement and  further instruction.     Diabetes Self-Management Care Plan Review and Evaluation of Progress:    During today's follow-up Cecilio's Diabetes Self-Management Care Plan progress was reviewed and progress was evaluated including his/her input. Cecilio has agreed to continue his/her journey to improve/maintain overall diabetes control by continuing to set health goals. See care plan progress below.      Care Plan: Diabetes Management   Updates made since 4/3/2024 12:00 AM     Problem: Blood Glucose Self-Monitoring         Long-Range Goal: Patient will transition to Dexcom G7 cgm. Completed 4/3/2025   Start Date: 3/6/2025   This Visit's Progress: Met   Recent Progress: On track   Priority: High   Barriers: No Barriers Identified   Note:    4/3/25:  Dexcom  downloaded and reviewed and sent to PCP (Dr West) as patient continues with global hyperglycemia.  Insulin doses adjusted today and new doses written out for spouse to avoid confusion.  No issues with changing sensors per spouse (she does the sensor changes).  Has already reordered Dexcom G7 sensor refill.      3/12/25: Dexcom G7  downloaded today and CGM data reviewed. Average glucose over past 7 days is 266 mg/dL with glucose time in target range 0%.  Per 2/24/25 PCP note (Dr. West), patient to consider increase in Semglee if fasting glucose levels not < 200 mg/dL.  Instructions provided to patient and spouse and will copy PCP on message as well.      First sensor change scheduled for 3/16/25 and walked spouse through menu on  for sensor change and choosing new site for sensor and entering new sensor pairing code.  She will call with any issues with first sensor change.     High alert set at OFF- will start with it off as patients wife states it can get as high as 300-400 on occasion  Low alert set at 70 mg/dL  High rising alert: OFF  Low dropping alert: OFF  Signal Loss: ON- 20 min  Brief Sensor Issue: ON--turned OFF at 3/12/25  due to frequent brief sensor issue alarms    Dexcom username: n/a (uses )        Problem: Medications         Goal: Continue Jardiance. Increase Semglee from 18 units to 22 units QHS per PCP today. Increase Novolog to 10 units AC (if pre meal glucose is > 200 mg/dL, increase Novolog to 14 units).    Start Date: 3/12/2025   Expected End Date: 2025   This Visit's Progress: On track   Priority: High   Barriers: No Barriers Identified   Note:    4/3/25:  PCP messaged after Dexcom  downloaded and CGM data reviewed and patient with continued hyperglycemia. The following information on DM medication changes written out and provided to spouse. Will return for Dexcom G7 download in clinic on 4/10/25 (1 week) as patient already has another appointment that day in building. Spouse verbalizes understanding of DM medication changes.     HOLLIFrancesco Coy   1940    Semglee (once daily long acting insulin)  Increase Semglee Lantus from 18 units to 22 units.  Try to find a consistent time to take Semglee--about 9pm nightly.   *If fasting glucose remains greater than 180 mg/dL, increase dose of Semglee by 2 units every 3 days.  Do not exceed 30 units of Semglee.    Thursday 4/3/25: 22 units Semglee  25: 22 units Semglee  25: 22 units Semglee  If fasting glucose on  morning (25) is greater than 180 mg/dL, increase nightly Semglee to 24 units.      Novolog (meal time insulin--taken up to 3 times daily)  Novolog 10 units before meals (5-15 minutes before). If pre meal glucose is greater than 200 mg/dL, increase Novolog to 14 units before eating.         Problem: Healthy Eating         Goal: Decrease portions of carbs at meals--spouse will eliminate orange juice at breakfast, banana at lunch, and decrease portion of carb at dinner.  Practiced meal planning with foods typically eaten.    Start Date: 4/3/2025   Expected End Date: 7/3/2025   Priority: Medium   Barriers: Knowledge  deficit        Task: Reviewed the sources and role of Carbohydrate, Protein, and Fat and how each nutrient impacts blood sugar. Completed 4/3/2025        Task: Provided visual examples using dry measuring cups, food models, and other familiar objects such as computer mouse, deck or cards, tennis ball etc. to help with visualization of portions. Completed 4/3/2025        Task: Discussed strategies for choosing healthier menu options when dining out. Completed 4/3/2025        Task: Recommended replacing beverages containing high sugar content with noncaloric/sugar free options and/or water. Completed 4/3/2025        Task: Provided Sample plate method and reviewed the use of the plate to estimate amounts of carbohydrate per meal. Completed 4/3/2025        Follow Up Plan     Follow up in 1 week (on 4/10/2025) for Dexcom G7  download and review CGM data--will send to PCP if not meeting target ranges. Insulin changes made today per PCP (Dr. West) and new insulin regimen written out and reviewed with patient/spouse to avoid confusion. Most of appointment spent reviewing diet--patient with large portions of carbs at meals--spouse willing to reduce portions at each meal and will work to include lean protein at all meals and snacks.     Today's care plan and follow up schedule was discussed with patient.  Cecilio verbalized understanding of the care plan, goals, and agrees to follow up plan.        The patient was encouraged to communicate with his/her health care provider/physician and care team regarding his/her condition(s) and treatment.  I provided the patient with my contact information today and encouraged to contact me via phone or [facility]'s Patient Portal as needed.     Length of Visit   Total Time: 60 Minutes

## 2025-04-10 ENCOUNTER — NUTRITION (OUTPATIENT)
Dept: DIABETES | Facility: CLINIC | Age: 85
End: 2025-04-10
Payer: MEDICARE

## 2025-04-10 ENCOUNTER — OFFICE VISIT (OUTPATIENT)
Dept: ORTHOPEDICS | Facility: CLINIC | Age: 85
End: 2025-04-10
Payer: MEDICARE

## 2025-04-10 VITALS — BODY MASS INDEX: 22.12 KG/M2 | HEIGHT: 68 IN | WEIGHT: 145.94 LBS

## 2025-04-10 DIAGNOSIS — S72.001A CLOSED DISPLACED FRACTURE OF RIGHT FEMORAL NECK: Primary | ICD-10-CM

## 2025-04-10 DIAGNOSIS — E11.40 TYPE 2 DIABETES MELLITUS WITH DIABETIC NEUROPATHY, WITH LONG-TERM CURRENT USE OF INSULIN: Primary | ICD-10-CM

## 2025-04-10 DIAGNOSIS — Z79.4 TYPE 2 DIABETES MELLITUS WITH DIABETIC NEUROPATHY, WITH LONG-TERM CURRENT USE OF INSULIN: Primary | ICD-10-CM

## 2025-04-10 PROCEDURE — 99999 PR PBB SHADOW E&M-EST. PATIENT-LVL I: CPT | Mod: PBBFAC,,, | Performed by: DIETITIAN, REGISTERED

## 2025-04-10 PROCEDURE — 1160F RVW MEDS BY RX/DR IN RCRD: CPT | Mod: CPTII,S$GLB,, | Performed by: ORTHOPAEDIC SURGERY

## 2025-04-10 PROCEDURE — 99999 PR PBB SHADOW E&M-EST. PATIENT-LVL III: CPT | Mod: PBBFAC,,, | Performed by: ORTHOPAEDIC SURGERY

## 2025-04-10 PROCEDURE — 1126F AMNT PAIN NOTED NONE PRSNT: CPT | Mod: CPTII,S$GLB,, | Performed by: ORTHOPAEDIC SURGERY

## 2025-04-10 PROCEDURE — 3288F FALL RISK ASSESSMENT DOCD: CPT | Mod: CPTII,S$GLB,, | Performed by: ORTHOPAEDIC SURGERY

## 2025-04-10 PROCEDURE — 99213 OFFICE O/P EST LOW 20 MIN: CPT | Mod: S$GLB,,, | Performed by: ORTHOPAEDIC SURGERY

## 2025-04-10 PROCEDURE — G0108 DIAB MANAGE TRN  PER INDIV: HCPCS | Mod: S$GLB,,, | Performed by: DIETITIAN, REGISTERED

## 2025-04-10 PROCEDURE — 1101F PT FALLS ASSESS-DOCD LE1/YR: CPT | Mod: CPTII,S$GLB,, | Performed by: ORTHOPAEDIC SURGERY

## 2025-04-10 PROCEDURE — 1159F MED LIST DOCD IN RCRD: CPT | Mod: CPTII,S$GLB,, | Performed by: ORTHOPAEDIC SURGERY

## 2025-04-10 RX ORDER — METHOCARBAMOL 750 MG/1
750 TABLET, FILM COATED ORAL 4 TIMES DAILY PRN
Qty: 44 TABLET | Refills: 3 | Status: SHIPPED | OUTPATIENT
Start: 2025-04-10

## 2025-04-10 NOTE — PROGRESS NOTES
"Diabetes Care Specialist Follow-up Note  Author: Elzbieta Javed RD, [facility]– [place]  Date: 4/10/2025    Intake    Program Intake  Reason for Diabetes Program Visit:: Intervention  Type of Intervention:: Individual  Education: Pattern Management  Current diabetes risk level:: high  Permission to speak with others about care:: yes (Spouse Karrie is patient's caregiver)    Current Diabetes Treatment: Insulin  Oral Medication Type/Dose: Jardiance 10 mg daily  Method of insulin delivery?: Injections  Injection Type: Pens  Pen Type/Dose: Semglee 24 units QHS, Novolog 10 units AC (if pre meal glucose > 200 mg/dL, increase to 14 units)--patient has been giving 14 units most meals    Continuous Glucose Monitoring  Patient has CGM: Yes  Personal CGM type:: Dexcom G7 ()  GMI Value:  (Unable to calculate--only pulled 1 week CGM data)    Lab Results   Component Value Date    HGBA1C 8.4 (H) 01/13/2025     Weight: 66.2 kg (145 lb 15.1 oz)   Height: 5' 8" (172.7 cm)   Body mass index is 22.19 kg/m².    Glucose Monitoring: Dexcom G7   download (4/4/2025 to 4/10/2025): See media file for full report--CGM data reviewed for 1 week since last changes made to insulin doses per PCP. Only 7% of glucose values are in target range. Glucose levels showing slight improvement but still not consistently less than 200 mg/dL.  Do not need tight glucose control but would aim for average glucose < 180 mg/dL.         Diabetes Self-Management Skills Assessment    Medication Skills Assessment  Patient is able to identify current diabetes medications, dosages, and appropriate timing of medications.: yes  Patient reports problems or concerns with current medication regimen.: yes  Medication regimen problems/concerns:: does not feel like regimen is working  Patient is  aware that some diabetes medications can cause low blood sugar?: Yes  Medication Skills Assessment Completed:: Yes  Assessment indicates:: Instruction Needed  Area of need?: " Yes    Home Blood Glucose Monitoring  Personal CGM type:: Dexcom G7 ()    During today's follow-up visit,  the following areas required further assessment and content was provided/reviewed.    Based on today's diabetes care assessment, the following areas of need were identified:      Identified Areas of Need      Medication/Current Diabetes Treatment: Yes--see care plan, insulin adjustments made today per PCP       Today's interventions were provided through individual discussion, instruction, and written materials were provided.    Patient verbalized understanding of instruction and written materials.  Pt was able to return back demonstration of instructions today. Patient understood key points, needs reinforcement and further instruction.     Diabetes Self-Management Care Plan Review and Evaluation of Progress:    During today's follow-up Cecilio's Diabetes Self-Management Care Plan progress was reviewed and progress was evaluated including his/her input. Cecilio has agreed to continue his/her journey to improve/maintain overall diabetes control by continuing to set health goals. See care plan progress below.      Care Plan: Diabetes Management   Updates made since 4/10/2024 12:00 AM     Problem: Blood Glucose Self-Monitoring         Long-Range Goal: Patient will transition to Dexcom G7 cgm. Completed 4/3/2025   Start Date: 3/6/2025   This Visit's Progress: Met   Recent Progress: On track   Priority: High   Barriers: No Barriers Identified   Note:    4/10/25:  Dexcom G7  downloaded and reviewed and message sent to PCP for continued insulin dose changes as patient not meeting glycemic targets.      4/3/25:  Dexcom  downloaded and reviewed and sent to PCP (Dr West) as patient continues with global hyperglycemia.  Insulin doses adjusted today and new doses written out for spouse to avoid confusion.  No issues with changing sensors per spouse (she does the sensor changes).  Has already  reordered Dexcom G7 sensor refill.      3/12/25: Dexcom G7  downloaded today and CGM data reviewed. Average glucose over past 7 days is 266 mg/dL with glucose time in target range 0%.  Per 2/24/25 PCP note (Dr. West), patient to consider increase in Semglee if fasting glucose levels not < 200 mg/dL.  Instructions provided to patient and spouse and will copy PCP on message as well.      First sensor change scheduled for 3/16/25 and walked spouse through menu on  for sensor change and choosing new site for sensor and entering new sensor pairing code.  She will call with any issues with first sensor change.     High alert set at OFF- will start with it off as patients wife states it can get as high as 300-400 on occasion  Low alert set at 70 mg/dL  High rising alert: OFF  Low dropping alert: OFF  Signal Loss: ON- 20 min  Brief Sensor Issue: ON--turned OFF at 3/12/25 due to frequent brief sensor issue alarms    Dexcom username: n/a (uses )        Problem: Medications         Goal: Continue Jardiance. Increase Semglee to 28 units QHS per PCP today. Continue Novolog to 10 units AC (if pre meal glucose is > 200 mg/dL, increase Novolog to 14 units).    Start Date: 3/12/2025   Expected End Date: 6/12/2025   This Visit's Progress: On track   Recent Progress: On track   Priority: High   Barriers: No Barriers Identified   Note:    4/10/25: PCP (Brett) messaged after Dexcom  downloaded and CGM data reviewed and patient with continued hyperglycemia but do see slight improvement in average glucose over past week since insulin doses last adjusted. Spouse gives patient insulin injections--states has been using upper arm and abdomen for insulin injections. To promote absorption of insulin--will suggest only using abdomen for insulin injections and rotating injection sites. Will return 5/15/25 for Dexcom G7  download in clinic.    Semglee (once daily long acting insulin)  Increase  Semglee to 28 units. Try to find a consistent time to take Semglee--about 9pm nightly.   *If fasting glucose remains greater than 160 mg/dL, increase dose of Semglee by 2 units every 3 days.  Do not exceed 36 units of Semglee.    Thursday 4/17/25: 30 units Semglee  Thursday 4/24/25: 32 units Semglee  Thursday 5/1/25: 34 units Semglee  Thursday 5/8/25: 36 units Semglee      Novolog (meal time insulin--taken up to 3 times daily)  Novolog 10 units before meals (5-15 minutes before). If pre meal glucose is greater than 200 mg/dL, increase Novolog to 14 units before eating.      4/3/25:  PCP messaged after Dexcom  downloaded and CGM data reviewed and patient with continued hyperglycemia. The following information on DM medication changes written out and provided to spouse. Will return for Dexcom G7 download in clinic on 4/10/25 (1 week) as patient already has another appointment that day in building. Spouse verbalizes understanding of DM medication changes.        Problem: Healthy Eating         Goal: Decrease portions of carbs at meals--spouse will eliminate orange juice at breakfast, banana at lunch, and decrease portion of carb at dinner.  Practiced meal planning with foods typically eaten.    Start Date: 4/3/2025   Expected End Date: 7/3/2025   This Visit's Progress: Not met   Priority: Medium   Barriers: Knowledge deficit   Note:    4/10/25:  Spouse said she is forgetting to add protein at some meals especially breakfast--for instance, had 2 waffles with low sugar syrup and also a banana this morning for breakfast (14 units of Novolog given prior to meal) and glucose 2 hours post prandial is > 400 mg/dL.  Re-educated on importance of limiting carbs at meals and including a protein source (peanut butter, egg, cheese).         Follow Up Plan     Follow up in 5 weeks (on 5/15/2025) for Dexcom G7  download and review--will send to PCP if glucose not consistently < 200 mg/dL. Has follow up with PCP  5/26/25. Insulin doses increased again today per PCP (See new doses above in care plan) and new doses written out for patient and spouse to avoid confusion.  Re-educated spouse on importance of including a protein source at all meals (especially breakfast) to prevent prandial spikes in glucose levels.     Today's care plan and follow up schedule was discussed with patient.  Cecilio verbalized understanding of the care plan, goals, and agrees to follow up plan.        The patient was encouraged to communicate with his/her health care provider/physician and care team regarding his/her condition(s) and treatment.  I provided the patient with my contact information today and encouraged to contact me via phone or [facility]'s Patient Portal as needed.     Length of Visit   Total Time: 40 Minutes

## 2025-04-11 RX ORDER — INSULIN GLARGINE-YFGN 100 [IU]/ML
18 INJECTION, SOLUTION SUBCUTANEOUS NIGHTLY
Qty: 18 ML | Refills: 1 | Status: SHIPPED | OUTPATIENT
Start: 2025-04-11

## 2025-04-11 NOTE — TELEPHONE ENCOUNTER
----- Message from Rosa sent at 4/11/2025  9:10 AM CDT -----  Regarding: script needed- due to change instruction on his medication  [phone]  - wife Karrie; there is  a change on his medication ordered by  so his script need to be change insulin glargine-yfgn (SEMGLEE,INSULIN GLARG-YFGN,PEN) 100 unit/mL (3 mL) [id number] [place] [id number] AT [facility]  [id number] [place] [id number]: [phone] Fax: [phone]: Not open 24 hoursAnnabelle

## 2025-04-14 ENCOUNTER — TELEPHONE (OUTPATIENT)
Facility: CLINIC | Age: 85
End: 2025-04-14
Payer: MEDICARE

## 2025-04-14 NOTE — TELEPHONE ENCOUNTER
----- Message from Monty sent at 4/14/2025 10:23 AM CDT -----  Regarding: reschedule  Contact: Wife: Karrie Cespedes  Type:  Patient Returning CallWho Called:Wife: Karrie CespedesWhjose Left Message for Patient:nurseDoes the patient know what this is regarding?:did not know about today's appointment for herself and husbandWould the patient rather a call back or a response via [facility]? Please call to [facility] Call Back Number:[phone] Information:

## 2025-04-17 ENCOUNTER — TELEPHONE (OUTPATIENT)
Dept: PRIMARY CARE CLINIC | Facility: CLINIC | Age: 85
End: 2025-04-17
Payer: MEDICARE

## 2025-04-17 DIAGNOSIS — E11.42 TYPE 2 DIABETES MELLITUS WITH DIABETIC POLYNEUROPATHY, WITH LONG-TERM CURRENT USE OF INSULIN: Primary | ICD-10-CM

## 2025-04-17 DIAGNOSIS — Z79.4 TYPE 2 DIABETES MELLITUS WITH DIABETIC POLYNEUROPATHY, WITH LONG-TERM CURRENT USE OF INSULIN: Primary | ICD-10-CM

## 2025-04-17 RX ORDER — INSULIN GLARGINE-YFGN 100 [IU]/ML
36 INJECTION, SOLUTION SUBCUTANEOUS NIGHTLY
Qty: 12 ML | Refills: 5 | Status: SHIPPED | OUTPATIENT
Start: 2025-04-17

## 2025-04-17 NOTE — TELEPHONE ENCOUNTER
Spoke with pt's wife, reiterated about the scale listed below and she verbalized understanding.    Increase Semglee to 28 units. Try to find a consistent time to take Semglee--about 9pm nightly.              *If fasting glucose remains greater than 160 mg/dL, increase dose of Semglee by 2 units every 3 days.  Do not exceed 36 units of Semglee.    Thursday 4/17/25: 30 units Semglee  Thursday 4/24/25: 32 units Semglee  Thursday 5/1/25: 34 units Semglee  Thursday 5/8/25: 36 units Semglee

## 2025-04-17 NOTE — TELEPHONE ENCOUNTER
Received a call from pt's wife, Karrie, stating that Semglee is no longer being covered by insurance.  Ms. Yoon states that [email or web address] will be contacting me to let me know which insulin they will now cover.    Called [facility] and spoke with the pharmacist.  I was informed that it's not that pt's insurance will not cover Semglee; it is stating that it is too soon to fill based on the quantity of insulin that pt is now getting vs what the original prescription was written for.      Called pt's wife and LM with this information.  Pt's wife is aware that the prescription is being written for 36 units nightly; however, she is to continue using the tapering up scale that was previously discussed with her.      Increase Semglee to 28 units. Try to find a consistent time to take Semglee--about 9pm nightly.              *If fasting glucose remains greater than 160 mg/dL, increase dose of Semglee by 2 units every 3 days.  Do not exceed 36 units of Semglee.    Thursday 4/17/25: 30 units Semglee  Thursday 4/24/25: 32 units Semglee  Thursday 5/1/25: 34 units Semglee  Thursday 5/8/25: 36 units Semglee    New Rx pended.

## 2025-04-17 NOTE — PROGRESS NOTES
Chief Complaint   Patient presents with    Right Hip - Post-op Evaluation       HPI:   This is a 85 y.o. who returns today status post right hip hemiarthroplasty 8 months ago. Patient has been FWB.  Pain is now gone. No numbness or tingling. No associated signs or symptoms.    Past Medical History:   Diagnosis Date    Colon polyp     Diabetes 2001    Diverticulosis     Hyperlipidemia     PONV (postoperative nausea and vomiting)     Stage 3 chronic kidney disease 12/12/2018    Type 2 diabetes mellitus      Past Surgical History:   Procedure Laterality Date    CATARACT EXTRACTION Left     COLONOSCOPY  03/14/2011    Dr. Dejesus: 2 colon polyps removed (one was 10 mm), diverticulosis, hemorrhoids, repeat in 3 years for surveillance; biopsy: FRAGMENTS OF TUBULAR ADENOMA.    COLONOSCOPY N/A 09/01/2020    Procedure: COLONOSCOPY;  Surgeon: Denny Dejesus MD;  Location: [facility] ENDO;  Service: Endoscopy;  Laterality: N/A;    HEMIARTHROPLASTY OF HIP Right 8/8/2024    Procedure: HEMIARTHROPLASTY, HIP;  Surgeon: Narciso Rivera MD;  Location: [facility] OR;  Service: Orthopedics;  Laterality: Right;    HERNIA REPAIR       Medications Ordered Prior to Encounter[1]  Review of patient's allergies indicates:   Allergen Reactions    No known allergies      Family History   Problem Relation Name Age of Onset    Diabetes Father      Diabetes Brother      Cancer Mother          stomach    Celiac disease Neg Hx      Colon cancer Neg Hx      Crohn's disease Neg Hx      Ulcerative colitis Neg Hx      Stomach cancer Neg Hx      Esophageal cancer Neg Hx      Melanoma Neg Hx      Psoriasis Neg Hx      Lupus Neg Hx      Eczema Neg Hx      Amblyopia Neg Hx      Blindness Neg Hx      Glaucoma Neg Hx      Macular degeneration Neg Hx      Retinal detachment Neg Hx      Strabismus Neg Hx       Social History[2]    Review of Systems:  Constitutional:  Denies fever or chills   Eyes:  Denies change in visual acuity   HENT:  Denies nasal congestion or  "sore throat   Respiratory:  Denies cough or shortness of breath   Cardiovascular:  Denies chest pain or edema   GI:  Denies abdominal pain, nausea, vomiting, bloody stools or diarrhea   :  Denies dysuria   Integument:  Denies rash   Neurologic:  Denies headache, focal weakness or sensory changes   Endocrine:  Denies polyuria or polydipsia   Lymphatic:  Denies swollen glands   Psychiatric:  Denies depression or anxiety     Physical Exam:   Constitutional:  Well developed, well nourished, no acute distress, non-toxic appearance   Integument:  Well hydrated, no rash   Lymphatic:  No lymphadenopathy noted   Neurologic:  Alert & oriented x 3, CN 2-12 normal, normal motor function, normal sensory function, no focal deficits noted   Psychiatric:  Speech and behavior appropriate   Gi: abdomen soft  Eyes: EOMI    L hip  Exam performed same as contralateral side and is normal    R hip  FROM. Incision healed. 3/5 quads. Skin intact. Nvi distally.     X-rays were performed today, personally reviewed by me and findings discussed with the patient.  2 views of the right hip show implants intact in good position      Closed displaced fracture of right femoral neck    Other orders  -     methocarbamoL (ROBAXIN) 750 MG Tab; Take 1 tablet (750 mg total) by mouth 4 (four) times daily as needed.  Dispense: 44 tablet; Refill: 3        Continue as tolerated. RTC 6 months           [1]   Current Outpatient Medications on File Prior to Visit   Medication Sig Dispense Refill    alendronate (FOSAMAX) 70 MG tablet Take 1 tablet (70 mg total) by mouth every 7 days. On an empty stomach, 30 minutes before food or laying down 4 tablet 11    aspirin (ECOTRIN) 81 MG EC tablet Take 1 tablet (81 mg total) by mouth once daily.      BD AUTOSHIELD DUO PEN NEEDLE 30 gauge x 3/16" Ndle       blood sugar diagnostic (FREESTYLE LITE STRIPS) Strp Check blood glucose once to twice daily 200 each PRN    blood-glucose meter,continuous (DEXCOM G7 ) Misc " "Check glucose 4 TO 6 times daily 1 each PRN    blood-glucose sensor (DEXCOM G7 SENSOR) Winsome Check glucose 4 to 6 times daily. Change sensor every 10 days 3 each 11    calcium carbonate/vitamin D3 (VITAMIN D-3 ORAL) Take 1 capsule by mouth every evening.      clobetasol 0.05% (TEMOVATE) 0.05 % Oint Apply topically 2 (two) times daily. 60 g 0    empagliflozin (JARDIANCE) 10 mg tablet Take 1 tablet (10 mg total) by mouth once daily. 90 tablet 3    EScitalopram oxalate (LEXAPRO) 10 MG tablet Take 1 tablet (10 mg total) by mouth once daily. 90 tablet 3    insulin aspart U-100 (NOVOLOG) 100 unit/mL (3 mL) InPn pen Take 6 units with each meal 3 times a day.  If glucose is greater than 200 before the meal then take 10 units. 15 mL 11    lancets (FREESTYLE LANCETS) 28 gauge Misc Check blood glucose once to twice daily 200 each PRN    lisinopriL (PRINIVIL,ZESTRIL) 2.5 MG tablet Take 1 tablet (2.5 mg total) by mouth once daily. 30 tablet 1    metoprolol tartrate (LOPRESSOR) 25 MG tablet Take one-half tablet (12.5 mg total) by mouth 2 (two) times daily. 30 tablet 1    pen needle, diabetic (BD KELLEY 2ND GEN PEN NEEDLE) 32 gauge x 5/32" Ndle Inject 1 Needle into the skin 4 (four) times daily. 100 each 3    pravastatin (PRAVACHOL) 20 MG tablet Take 1 tablet (20 mg total) by mouth once daily. 90 tablet 3    sodium phosphates (ENEMA) 19-7 gram/118 mL Enem Place 1 enema rectally once as needed (constipation).      solifenacin (VESICARE) 10 MG tablet Take 1 tablet (10 mg total) by mouth once daily. 30 tablet 11     No current facility-administered medications on file prior to visit.   [2]   Social History  Socioeconomic History    Marital status:    Tobacco Use    Smoking status: Former     Current packs/day: 0.00     Average packs/day: 2.0 packs/day for 20.0 years (40.0 ttl pk-yrs)     Types: Cigars, Cigarettes     Start date: 1996     Quit date: 2016     Years since quittin.7    Smokeless tobacco: Never    Tobacco " comments:     2 cigars per day   Substance and Sexual Activity    Alcohol use: Yes     Comment: very seldom    Drug use: No     Social Drivers of Health     Financial Resource Strain: Low Risk  (4/7/2025)    Overall Financial Resource Strain (CARDIA)     Difficulty of Paying Living Expenses: Not very hard   Food Insecurity: No Food Insecurity (4/7/2025)    Hunger Vital Sign     Worried About Running Out of Food in the Last Year: Never true     Ran Out of Food in the Last Year: Never true   Transportation Needs: No Transportation Needs (4/7/2025)    PRAPARE - Transportation     Lack of Transportation (Medical): No     Lack of Transportation (Non-Medical): No   Physical Activity: Inactive (4/7/2025)    Exercise Vital Sign     Days of Exercise per Week: 0 days     Minutes of Exercise per Session: 0 min   Stress: No Stress Concern Present (4/7/2025)    [employer] [place] [employer] - Occupational Stress Questionnaire     Feeling of Stress : Only a little   Housing Stability: Low Risk  (4/7/2025)    Housing Stability Vital Sign     Unable to Pay for Housing in the Last Year: No     Number of Times Moved in the Last Year: 0     Homeless in the Last Year: No

## 2025-04-21 ENCOUNTER — TELEPHONE (OUTPATIENT)
Dept: PRIMARY CARE CLINIC | Facility: CLINIC | Age: 85
End: 2025-04-21
Payer: MEDICARE

## 2025-04-21 NOTE — TELEPHONE ENCOUNTER
This was explained in detail to his wife last week.  They are currently titrating the dose up to 36 this is why the prescription was written this way.  They will follow the instructions that she was given by the diabetic educator in the meantime until they reached 36 units

## 2025-04-21 NOTE — TELEPHONE ENCOUNTER
Kim from [facility] called for clarification on Semglee insulin. Rx was refilled for 36 units qHS 4/17/25, but wife said she give patient 28 units qHS. Please review and advise.    Kim  #[phone]

## 2025-04-24 NOTE — TELEPHONE ENCOUNTER
Spoke with Rosina at [facility] and advised her that the Rx was written that way because pt is titrating up on the Semglee.  She reports that Rx was picked up yesterday and it reflects the current Rx of 36 units per night.  However, pt's wife is fully aware of how to titrate medication as she is following specific instructions from the diabetic educator.

## 2025-04-26 DIAGNOSIS — E78.5 DYSLIPIDEMIA: ICD-10-CM

## 2025-04-28 RX ORDER — PRAVASTATIN SODIUM 20 MG/1
20 TABLET ORAL DAILY
Qty: 100 TABLET | Refills: 3 | Status: SHIPPED | OUTPATIENT
Start: 2025-04-28

## 2025-05-01 ENCOUNTER — TELEPHONE (OUTPATIENT)
Dept: PHARMACY | Facility: CLINIC | Age: 85
End: 2025-05-01
Payer: MEDICARE

## 2025-05-01 DIAGNOSIS — I10 HYPERTENSION, UNSPECIFIED TYPE: ICD-10-CM

## 2025-05-01 RX ORDER — LISINOPRIL 2.5 MG/1
2.5 TABLET ORAL DAILY
Qty: 100 TABLET | Refills: 1 | Status: SHIPPED | OUTPATIENT
Start: 2025-05-01 | End: 2025-11-17

## 2025-05-01 NOTE — TELEPHONE ENCOUNTER
[facility] Chart Review & Patient Outreach Details For Medication Adherence Project    Reason for Outreach Encounter: 3rd Party payor non-compliance report ([employer], [employer], [employer], etc)  2.  Patient Outreach Method: Reviewed patient chart  and [facility] message  3.   Medication in question:    Hypertension Medications              lisinopriL (PRINIVIL,ZESTRIL) 2.5 MG tablet Take 1 tablet (2.5 mg total) by mouth once daily.    metoprolol tartrate (LOPRESSOR) 25 MG tablet Take one-half tablet (12.5 mg total) by mouth 2 (two) times daily.                 LF 60 ds 3/10/25    4.  Reviewed and or Updates Made To: Patient Chart  5. Outreach Outcomes and/or actions taken: Patient filled medication and is on track to be adherent  Additional Notes:   5/1/25 - sent refill request to PCP

## 2025-05-01 NOTE — TELEPHONE ENCOUNTER
Medication Adherence [facility]     I am reaching out to request a refill authorization for Mr. Cespedes for their prescribed medication listed below.     Hypertension Medications              lisinopriL (PRINIVIL,ZESTRIL) 2.5 MG tablet Take 1 tablet (2.5 mg total) by mouth once daily.                     Please send a refill over to [facility]. Let me know if any additional information is needed.     Cassia Clemons  Clinical Pharmacist, [facility]     Encounter Provider NEELIMA LOWERY is live with [facility]:

## 2025-05-10 DIAGNOSIS — I10 HYPERTENSION, UNSPECIFIED TYPE: ICD-10-CM

## 2025-05-12 RX ORDER — METOPROLOL TARTRATE 25 MG/1
12.5 TABLET, FILM COATED ORAL 2 TIMES DAILY
Qty: 30 TABLET | Refills: 1 | Status: SHIPPED | OUTPATIENT
Start: 2025-05-12 | End: 2025-07-13

## 2025-05-15 ENCOUNTER — CLINICAL SUPPORT (OUTPATIENT)
Dept: DIABETES | Facility: CLINIC | Age: 85
End: 2025-05-15
Payer: MEDICARE

## 2025-05-15 VITALS — BODY MASS INDEX: 22.05 KG/M2 | WEIGHT: 145.5 LBS | HEIGHT: 68 IN

## 2025-05-15 DIAGNOSIS — Z79.4 TYPE 2 DIABETES MELLITUS WITH DIABETIC NEUROPATHY, WITH LONG-TERM CURRENT USE OF INSULIN: Primary | ICD-10-CM

## 2025-05-15 DIAGNOSIS — E11.40 TYPE 2 DIABETES MELLITUS WITH DIABETIC NEUROPATHY, WITH LONG-TERM CURRENT USE OF INSULIN: Primary | ICD-10-CM

## 2025-05-15 PROCEDURE — 99999 PR PBB SHADOW E&M-EST. PATIENT-LVL I: CPT | Mod: PBBFAC,,, | Performed by: DIETITIAN, REGISTERED

## 2025-05-15 PROCEDURE — G0108 DIAB MANAGE TRN  PER INDIV: HCPCS | Mod: S$GLB,,, | Performed by: DIETITIAN, REGISTERED

## 2025-05-15 NOTE — PROGRESS NOTES
"Diabetes Care Specialist Follow-up Note  Author: Elzbieta Javed RD, [facility]  Date: 5/15/2025    Intake    Program Intake  Reason for Diabetes Program Visit:: Intervention  Type of Intervention:: Individual  Individual: Education  Education: Pattern Management  Current diabetes risk level:: high  In the last month, have you used the ER or been admitted to the hospital: No  Permission to speak with others about care:: yes (spouse Karrie is with patient and is caregiver)    Current Diabetes Treatment: Insulin  Oral Medication Type/Dose: Jardiance 10 mg daily  Method of insulin delivery?: Injections  Injection Type: Pens  Pen Type/Dose: Semglee 36 units QHS (has been titrating up slowly), Novolog 10 units before meals (if pre meal glucose greater than 200 mg/dL, will take 14 units)    Continuous Glucose Monitoring  Patient has CGM: Yes  Personal CGM type:: Dexcom G7   GMI Date: 05/15/25  GMI Value: 8.7 %    Lab Results   Component Value Date    HGBA1C 8.4 (H) 01/13/2025     Weight: 66 kg (145 lb 8.1 oz)   Height: 5' 8" (172.7 cm)   Body mass index is 22.12 kg/m².  Weight stable since last visit on 4/10/2025    Glucose Monitoring: Dexcom G7  download (5/2/2025 to 5/15/2025): See media file for full report. 18% of glucose values are in target range--improved from last download 1 month ago but not yet meeting goals so basal insulin will continued to be titrated up. However, larger prandial excursions noted in afternoon and evening--evening dose of Novolog to be increased per PCP.  No episodes of hypoglycemia noted in the download.        Diabetes Self-Management Skills Assessment    Medication Skills Assessment  Patient is able to identify current diabetes medications, dosages, and appropriate timing of medications.: yes  Patient reports problems or concerns with current medication regimen.: no  Patient is  aware that some diabetes medications can cause low blood sugar?: Yes  Medication Skills Assessment " Completed:: Yes  Assessment indicates:: Instruction Needed  Area of need?: Yes    Nutrition/Healthy Eating  Meal Plan 24 Hour Recall - Breakfast: 2 waffles with sugar free syrup, piece of breakfast sausage  Meal Plan 24 Hour Recall - Lunch: crackers & cheese  Meal Plan 24 Hour Recall - Dinner: ham steak, potato salad, baked beans, mini ice cream sandwich  Meal Plan 24 Hour Recall - Snack: grapes, small banana  Meal Plan 24 Hour Recall - Beverage: water  Who shops/cooks?: Spouse shops and prepares meals.  Currently has a good appetite--do not want to limit intake and promote weight loss.  Patient can identify foods that impact blood sugar.: yes  Nutrition/Healthy Eating Skills Assessment Completed:: Yes  Assessment indicates:: Instruction Needed  Area of need?: Yes    Home Blood Glucose Monitoring  Monitoring Method:: personal continuous glucose monitor  Personal CGM type:: Dexcom G7    What is your current Time in Range?: 18% (increased from 7% since last visit with insulin adjustments)  What is your A1c Target?: <8%--avoid hypoglycemia, do not need tight glycemic control  Home Blood Glucose Monitoring Skills Assessment Completed: : Yes  Assessment indicates:: Adequate understanding  Area of need?: No    During today's follow-up visit,  the following areas required further assessment and content was provided/reviewed.    Based on today's diabetes care assessment, the following areas of need were identified:      Identified Areas of Need      Medication/Current Diabetes Treatment: Yes--see care plan, insulin doses adjusted today by PCP for continued improvement in glucose control   Nutrition/Healthy Eating: Yes --discussed skipping meal dose of Novolog if not eating a meal (patient may sleep through lunch) and finding lower carb snack options.   Home Blood Glucose Monitoring: No      Today's interventions were provided through individual discussion, instruction, and written materials were provided.    Patient  verbalized understanding of instruction and written materials.  Pt was able to return back demonstration of instructions today. Patient understood key points, needs reinforcement and further instruction.     Diabetes Self-Management Care Plan Review and Evaluation of Progress:    During today's follow-up Cecilio's Diabetes Self-Management Care Plan progress was reviewed and progress was evaluated including his/her input. Cecilio has agreed to continue his/her journey to improve/maintain overall diabetes control by continuing to set health goals. See care plan progress below.      Care Plan: Diabetes Management   Updates made since 5/15/2024 12:00 AM     Problem: Blood Glucose Self-Monitoring         Long-Range Goal: Patient will transition to Dexcom G7 cgm. Completed 4/3/2025   Start Date: 3/6/2025   This Visit's Progress: Met   Recent Progress: On track   Priority: High   Barriers: No Barriers Identified   Note:    4/3/25:  Dexcom  downloaded and reviewed and sent to PCP (Dr West) as patient continues with global hyperglycemia.  Insulin doses adjusted today and new doses written out for spouse to avoid confusion.  No issues with changing sensors per spouse (she does the sensor changes).  Has already reordered Dexcom G7 sensor refill.      3/12/25: Dexcom G7  downloaded today and CGM data reviewed. Average glucose over past 7 days is 266 mg/dL with glucose time in target range 0%.  Per 2/24/25 PCP note (Dr. West), patient to consider increase in Semglee if fasting glucose levels not < 200 mg/dL.  Instructions provided to patient and spouse and will copy PCP on message as well.      First sensor change scheduled for 3/16/25 and walked spouse through menu on  for sensor change and choosing new site for sensor and entering new sensor pairing code.  She will call with any issues with first sensor change.     High alert set at OFF- will start with it off as patients wife states it can  get as high as 300-400 on occasion  Low alert set at 70 mg/dL  High rising alert: OFF  Low dropping alert: OFF  Signal Loss: ON- 20 min  Brief Sensor Issue: ON--turned OFF at 3/12/25 due to frequent brief sensor issue alarms    Dexcom username: n/a (uses )        Problem: Medications         Goal: Continue Jardiance. Increase Semglee to 41 units QHS per PCP today. Continue Novolog to 10 units before breakfast and lunch and 14 units before dinner (if pre meal glucose is > 200 mg/dL, increase Novolog by 4 units).    Start Date: 3/12/2025   Expected End Date: 7/11/2025   This Visit's Progress: On track   Recent Progress: On track   Priority: High   Barriers: No Barriers Identified   Note:    5/15/25: PCP messaged (Brett) after Dexcom  uploaded and CGM data reviewed. Continued improvement with titrating of Semglee but glucose levels not yet consistently less than 200 mg/dL. Adjustments to insulin recommended to patient and new insulin regimen printed out for patient/spouse to avoid confusion. Has PCP follow up in 1-2 weeks on 5/26/25. Spouse administers insulin injections. Denies missed doses. Avoid hypoglycemia at all costs--do not need tight glycemic control--OK for average glucose to be 150-180 mg/dL.     Semglee (long acting insulin) 41 units each evening (9pm-- try to take within same hour daily)  Insulin aspart/Novolog(rapid acting insulin):  Breakfast: 10 units (14 units if glucose is higher than 200 before eating)  Brunch/Lunch: 10 units (14 units if glucose is higher than 200 before eating)  Supper/Dinner: 14 units (18 units if glucose is higher than 200 before eating)    Please take Novolog 5-15 minutes BEFORE meals that contain carbohydrates.  If skipping a meal or only eating protein (egg, cheese), do not take the Novolog prior to eating.  Do not take Novolog less than 3-4 hours apart--if you are hungry, eat cheese, nuts, deli meat, hard boiled egg.     4/10/25: PCP (Brett)  messaged after Dexcom  downloaded and CGM data reviewed and patient with continued hyperglycemia but do see slight improvement in average glucose over past week since insulin doses last adjusted. Spouse gives patient insulin injections--states has been using upper arm and abdomen for insulin injections. To promote absorption of insulin--will suggest only using abdomen for insulin injections and rotating injection sites. Will return 5/15/25 for Dexcom G7  download in clinic.    4/3/25:  PCP messaged after Dexcom  downloaded and CGM data reviewed and patient with continued hyperglycemia. The following information on DM medication changes written out and provided to spouse. Will return for Dexcom G7 download in clinic on 4/10/25 (1 week) as patient already has another appointment that day in building. Spouse verbalizes understanding of DM medication changes.        Problem: Healthy Eating         Goal: Decrease portions of carbs at meals--spouse will eliminate orange juice at breakfast, banana at lunch, and decrease portion of carb at dinner.  Practiced meal planning with foods typically eaten.    Start Date: 4/3/2025   Expected End Date: 7/3/2025   This Visit's Progress: On track   Recent Progress: Not met   Priority: Medium   Barriers: Knowledge deficit   Note:    5/15/25: Has done much better with balancing meals by including protein source at all meals (especially breakfast). Largest meal in evening. Patient does still have a good appetite and snacking on carbs (usually mid to late afternoon) in between meals--do not want to limit consumption and promote weight loss. Adjustments to meal time insulin made today by PCP (Dr. West).    4/10/25:  Spouse said she is forgetting to add protein at some meals especially breakfast--for instance, had 2 waffles with low sugar syrup and also a banana this morning for breakfast (14 units of Novolog given prior to meal) and glucose 2 hours post  prandial is > 400 mg/dL.  Re-educated on importance of limiting carbs at meals and including a protein source (peanut butter, egg, cheese).         Follow Up Plan     Follow up if symptoms worsen or fail to improve, for continued DSMES if glucose control not meeting target glucose goals. Dexcom G7  downloaded and reviewed today by diabetes care specialist and messaged PCP for continued titration of insulin doses. Glucose control improving but not yet meeting goals. Avoid hypoglycemia, do not need tight glycemic control--recommend target glucose of 150-180 mg/dL. Semglee increase today from 36 to 41 units nightly and Novolog dose increased at supper/dinner (patient's largest meal).  New insulin doses printed out for patient/spouse to avoid confusion in administering. Next Hgb A1c scheduled for 5/22/25 with PCP follow up on 5/26/25.    Today's care plan and follow up schedule was discussed with patient.  Cecilio verbalized understanding of the care plan, goals, and agrees to follow up plan.        The patient was encouraged to communicate with his/her health care provider/physician and care team regarding his/her condition(s) and treatment.  I provided the patient with my contact information today and encouraged to contact me via phone or [facility] as needed.     Length of Visit   Total Time: 40 Minutes

## 2025-05-19 DIAGNOSIS — Z79.4 TYPE 2 DIABETES MELLITUS WITH DIABETIC POLYNEUROPATHY, WITH LONG-TERM CURRENT USE OF INSULIN: ICD-10-CM

## 2025-05-19 DIAGNOSIS — E11.42 TYPE 2 DIABETES MELLITUS WITH DIABETIC POLYNEUROPATHY, WITH LONG-TERM CURRENT USE OF INSULIN: ICD-10-CM

## 2025-05-19 RX ORDER — INSULIN GLARGINE-YFGN 100 [IU]/ML
INJECTION, SOLUTION SUBCUTANEOUS
Qty: 12 ML | Refills: 5 | Status: SHIPPED | OUTPATIENT
Start: 2025-05-19

## 2025-05-19 RX ORDER — INSULIN ASPART 100 [IU]/ML
INJECTION, SOLUTION INTRAVENOUS; SUBCUTANEOUS
Qty: 15 ML | Refills: 11 | Status: SHIPPED | OUTPATIENT
Start: 2025-05-19

## 2025-05-19 NOTE — TELEPHONE ENCOUNTER
Patient's wife called stating that the patient's insulin was increased on 5/15/25. She says the patient will run out of the insulin he has due to the dose needing to be updated on the prescription.

## 2025-05-22 ENCOUNTER — LAB VISIT (OUTPATIENT)
Dept: LAB | Facility: HOSPITAL | Age: 85
End: 2025-05-22
Attending: FAMILY MEDICINE
Payer: MEDICARE

## 2025-05-22 DIAGNOSIS — E11.42 TYPE 2 DIABETES MELLITUS WITH DIABETIC POLYNEUROPATHY, WITH LONG-TERM CURRENT USE OF INSULIN: ICD-10-CM

## 2025-05-22 DIAGNOSIS — Z79.4 TYPE 2 DIABETES MELLITUS WITH DIABETIC POLYNEUROPATHY, WITH LONG-TERM CURRENT USE OF INSULIN: ICD-10-CM

## 2025-05-22 LAB
ALBUMIN SERPL BCP-MCNC: 3.7 G/DL (ref 3.5–5.2)
ALP SERPL-CCNC: 54 UNIT/L (ref 40–150)
ALT SERPL W/O P-5'-P-CCNC: 11 UNIT/L (ref 10–44)
ANION GAP (OHS): 10 MMOL/L (ref 8–16)
AST SERPL-CCNC: 12 UNIT/L (ref 11–45)
BILIRUB SERPL-MCNC: 0.5 MG/DL (ref 0.1–1)
BUN SERPL-MCNC: 27 MG/DL (ref 8–23)
CALCIUM SERPL-MCNC: 9.2 MG/DL (ref 8.7–10.5)
CHLORIDE SERPL-SCNC: 104 MMOL/L (ref 95–110)
CO2 SERPL-SCNC: 22 MMOL/L (ref 23–29)
CREAT SERPL-MCNC: 1.7 MG/DL (ref 0.5–1.4)
EAG (OHS): 189 MG/DL (ref 68–131)
GFR SERPLBLD CREATININE-BSD FMLA CKD-EPI: 39 ML/MIN/1.73/M2
GLUCOSE SERPL-MCNC: 201 MG/DL (ref 70–110)
HBA1C MFR BLD: 8.2 % (ref 4–5.6)
POTASSIUM SERPL-SCNC: 4.3 MMOL/L (ref 3.5–5.1)
PROT SERPL-MCNC: 6.9 GM/DL (ref 6–8.4)
SODIUM SERPL-SCNC: 136 MMOL/L (ref 136–145)

## 2025-05-22 PROCEDURE — 83036 HEMOGLOBIN GLYCOSYLATED A1C: CPT

## 2025-05-22 PROCEDURE — 82040 ASSAY OF SERUM ALBUMIN: CPT

## 2025-05-22 PROCEDURE — 36415 COLL VENOUS BLD VENIPUNCTURE: CPT | Mod: PN

## 2025-05-23 ENCOUNTER — RESULTS FOLLOW-UP (OUTPATIENT)
Dept: PRIMARY CARE CLINIC | Facility: CLINIC | Age: 85
End: 2025-05-23

## 2025-05-26 ENCOUNTER — OFFICE VISIT (OUTPATIENT)
Dept: PRIMARY CARE CLINIC | Facility: CLINIC | Age: 85
End: 2025-05-26
Payer: MEDICARE

## 2025-05-26 VITALS
HEART RATE: 76 BPM | SYSTOLIC BLOOD PRESSURE: 102 MMHG | OXYGEN SATURATION: 99 % | TEMPERATURE: 98 F | RESPIRATION RATE: 17 BRPM | DIASTOLIC BLOOD PRESSURE: 58 MMHG | WEIGHT: 147.5 LBS | BODY MASS INDEX: 22.43 KG/M2

## 2025-05-26 DIAGNOSIS — Z79.4 TYPE 2 DIABETES MELLITUS WITH DIABETIC POLYNEUROPATHY, WITH LONG-TERM CURRENT USE OF INSULIN: Primary | ICD-10-CM

## 2025-05-26 DIAGNOSIS — E11.22 CKD STAGE 3 DUE TO TYPE 2 DIABETES MELLITUS: ICD-10-CM

## 2025-05-26 DIAGNOSIS — N18.30 CKD STAGE 3 DUE TO TYPE 2 DIABETES MELLITUS: ICD-10-CM

## 2025-05-26 DIAGNOSIS — G31.84 MILD COGNITIVE IMPAIRMENT: Chronic | ICD-10-CM

## 2025-05-26 DIAGNOSIS — E11.42 TYPE 2 DIABETES MELLITUS WITH DIABETIC POLYNEUROPATHY, WITH LONG-TERM CURRENT USE OF INSULIN: Primary | ICD-10-CM

## 2025-05-26 PROCEDURE — 99999 PR PBB SHADOW E&M-EST. PATIENT-LVL V: CPT | Mod: PBBFAC,,, | Performed by: FAMILY MEDICINE

## 2025-05-26 NOTE — PROGRESS NOTES
Primary Care Provider Appointment   [facility], [place]       Patient ID: Cecilio Cespedes is a 85 y.o. male.    ASSESSMENT/PLAN by Problem List:    Assessment & Plan    IMPRESSION:  - Diabetes management: A1C improved from 8.4% to 8.2%, average glucose around 190 mg/dL. Conservative titration approach due to age and risk factors.  - Satisfied with current diabetes control; aiming for A1C around 8% while maintaining stable home glucose readings.  - Intentionally slowed insulin dose increases due to age and current insulin requirements.  - Chronic renal disease: Gradual increase in creatinine noted (now 1.7, range 1.3-2.21). Continued to monitor and avoid nephrotoxic drugs.  - Mild cognitive impairment: Gradual progression without significant changes. Primarily presenting as forgetfulness and lack of motivation.  - Considered overall health status and quality of life in diabetes management decisions.  - Continued insulin regimen with conservative titration.    ## TYPE 2 DIABETES MELLITUS WITH HYPERGLYCEMIA:  - Noted improvement in the patient's A1C from 8.4% to 8.2%, which is satisfactory considering the patient's age and risk factors.  - Home glucose readings typically range below 200 in the morning and in the 200s after meals, occasionally reaching the 300s after special occasions.  - Calculated average glucose of approximately 190 aligns with home readings.  - Mr. Cespedes is using a Dexcom for glucose monitoring and consulting with endocrinology.    ## CHRONIC KIDNEY DISEASE:  - Monitored creatinine levels which have been gradually increasing, now at 1.7, with an overall range between 1.3 and 2.21.  - No dramatic changes noted and current levels are acceptable.  - Plan to continue monitoring creatinine levels and avoid nephrotoxic drugs.    ## MILD COGNITIVE IMPAIRMENT:  - Observed gradual progression of cognitive impairment without significant changes.  - Mr. Cespedes exhibits  primarily forgetfulness and lack of motivation, without apparent agitation, anger, or hallucinations.  - Will continue monitoring cognitive status.    ## CIRCADIAN RHYTHM SLEEP DISORDER:  - Noted the patient sleeps until 12 PM and resists getting up, which affects meal schedule.  - Advised maintaining a consistent schedule due to insulin requirements.    ## FOLLOW-UP PLAN:  - Ordered labs and scheduled follow up in 3 months.         CODING, ORDERS, AND ADDITIONAL DOCUMENTATION RELATED TO THIS ENCOUNTER:  (note that the diagnoses and clinical summaries above were generated with the assistance of ambient [employer] software and represents my assessment and plan.  This software does not always generate the precise nor most specific diagnosis codes.  Therefore the specific diagnosis codes and orders for billing purposes are detailed below along with any additional documentation if needed)      1. Type 2 diabetes mellitus with diabetic polyneuropathy, with long-term current use of insulin  -     Comprehensive Metabolic Panel; Future; Expected date: 05/26/2025  -     Hemoglobin A1C; Future; Expected date: 05/26/2025    2. CKD stage 3 due to type 2 diabetes mellitus    3. Mild cognitive impairment           Follow Up:  3 months      Advance Care Planning     Date: 05/26/2025    Power of   I initiated the process of voluntary advance care planning today and explained the importance of this process to the patient.  I introduced the concept of advance directives to the patient, as well. Then the patient received detailed information about the importance of designating a Health Care Power of  (HCPOA). He was also instructed to communicate with this person about their wishes for future healthcare, should he become sick and lose decision-making capacity. The patient has previously appointed a HCPOA. After our discussion, the patient has decided to complete a HCPOA and has appointed his significant other, health  care agent: wife & health care agent number: see chart. I encouraged him to communicate with this person about their wishes for future healthcare, should he become sick and lose decision-making capacity.    Reviewed with is wife again, no changes  A total of 5 min was spent on advance care planning, goals of care discussion, emotional support, formulating and communicating prognosis and exploring burden/benefit of various approaches of treatment. This discussion occurred on a fully voluntary basis with the verbal consent of the patient and/or family.         Subjective:       Diabetes  He has type 2 diabetes mellitus. No MedicAlert identification noted. Hypoglycemia symptoms include dizziness, hunger, mood changes, sleepiness and tremors. Pertinent negatives for hypoglycemia include no confusion, headaches, nervousness/anxiousness, pallor, seizures, speech difficulty or sweats. Associated symptoms include fatigue and polyphagia. Pertinent negatives for diabetes include no blurred vision, no chest pain, no foot paresthesias, no foot ulcerations, no polydipsia, no polyuria, no visual change, no weakness and no weight loss. Pertinent negatives for hypoglycemia complications include no blackouts, no hospitalization, no nocturnal hypoglycemia, no required assistance and no required glucagon injection. Symptoms are stable. Diabetic complications include impotence and PVD. Pertinent negatives for diabetic complications include no autonomic neuropathy, CVA, heart disease, nephropathy, peripheral neuropathy or retinopathy. Risk factors for coronary artery disease include dyslipidemia, hypertension, sedentary lifestyle, diabetes mellitus and male sex. Current diabetic treatment includes insulin injections and oral agent (triple therapy). He is compliant with treatment most of the time. He is currently taking insulin pre-breakfast, pre-lunch, pre-dinner and at bedtime. Insulin injections are given by significant other.  Rotation sites for injection include the abdominal wall. His weight is stable. He is following a generally healthy diet. When asked about meal planning, he reported none. He has had a previous visit with a dietitian. He never participates in exercise. He monitors blood glucose at home 3-4 x per day. Blood glucose monitoring compliance is good. His home blood glucose trend is fluctuating minimally. He sees a podiatrist.Eye exam is current.       Patient is a/an 85 y.o.  male     For complete problem list, past medical history, surgical history, social history, etc., see appropriate section in the electronic medical record    History of Present Illness    CHIEF COMPLAINT:  Mr. Cespedes presents today for follow up of diabetes management.    TYPE 2 DIABETES MANAGEMENT:  He has Type 2 Diabetes with recent elevations requiring insulin titration. Morning blood sugars are usually below 200, while remaining in the 200s throughout the day. He reports occasional transient rises to 300s during special occasions. He uses a Dexcom continuous glucose monitor. A1C has improved from 8.4% to 8.2%. His current insulin regimen includes 10 units in the morning, 14 units during the day when blood sugar is in the 200s, and 41 units of long-acting Semglee in the evening. He notes concerns about leakage after evening insulin injection, requiring alcohol pad wiping.    DIET:  He has breakfast at noon, consisting of either waffles with sausage mala or cereal with fruit. Lunch includes crackers with cheese or peanut butter and a banana. He is limited to one root beer daily.    SLEEP CONCERNS:  He sleeps until noon and resists getting up, often having conflicts with caregiver about waking. He dozes off during the day despite denying sleep, and appears startled when approached during these episodes.    CHRONIC KIDNEY DISEASE:  He has chronic kidney disease with creatinine gradually increasing to 1.7, having ranged between 1.3 and  2.21.    COGNITIVE STATUS:  He reports gradual progression of mild cognitive impairment, experiencing forgetfulness and lack of motivation. He denies agitation, anger, or hallucinations.      ROS:  Neurological: +forgetfulness, +excessive drowsiness  Psychiatric: +lack of interest       Review of Systems   Constitutional:  Positive for fatigue. Negative for weight loss.   Eyes:  Negative for blurred vision.   Cardiovascular:  Negative for chest pain.   Endocrine: Positive for polyphagia. Negative for polydipsia and polyuria.   Genitourinary:  Positive for impotence.   Skin:  Negative for pallor.   Neurological:  Positive for dizziness and tremors. Negative for seizures, speech difficulty, weakness and headaches.   Psychiatric/Behavioral:  Negative for confusion. The patient is not nervous/anxious.        Objective     Physical Exam  Vitals reviewed.   Constitutional:       General: He is not in acute distress.     Appearance: He is well-developed. He is not toxic-appearing.   HENT:      Head: Normocephalic and atraumatic.   Eyes:      General: No scleral icterus.     Conjunctiva/sclera: Conjunctivae normal.   Cardiovascular:      Rate and Rhythm: Normal rate and regular rhythm.      Heart sounds: Murmur heard.   Pulmonary:      Effort: Pulmonary effort is normal. No respiratory distress.      Breath sounds: No wheezing or rales.   Skin:     General: Skin is warm and dry.   Neurological:      Mental Status: He is alert.      Deep Tendon Reflexes: Reflexes are normal and symmetric.      Comments: Patient is ambulating with a walker.     Psychiatric:         Behavior: Behavior normal.       Vitals:    05/26/25 1400   BP: (!) 102/58   BP Location: Right arm   Patient Position: Sitting   Pulse: 76   Resp: 17   Temp: 98.4 °F (36.9 °C)   TempSrc: Oral   SpO2: 99%   Weight: 66.9 kg (147 lb 7.8 oz)     Physical Exam                This note was generated with the assistance of ambient listening technology. Verbal consent was  obtained by the patient and accompanying visitor(s) for the recording of patient appointment to facilitate this note. I attest to having reviewed and edited the generated note for accuracy, though some syntax or spelling errors may persist. Please contact the author of this note for any clarification.  Parts of the note were also generated with voice recognition software.  Occasionally this software will misinterpreted words or phrases

## 2025-05-31 DIAGNOSIS — E11.42 TYPE 2 DIABETES MELLITUS WITH DIABETIC POLYNEUROPATHY, WITHOUT LONG-TERM CURRENT USE OF INSULIN: Chronic | ICD-10-CM

## 2025-06-08 DIAGNOSIS — E11.42 TYPE 2 DIABETES MELLITUS WITH DIABETIC POLYNEUROPATHY, WITH LONG-TERM CURRENT USE OF INSULIN: ICD-10-CM

## 2025-06-08 DIAGNOSIS — Z79.4 TYPE 2 DIABETES MELLITUS WITH DIABETIC POLYNEUROPATHY, WITH LONG-TERM CURRENT USE OF INSULIN: ICD-10-CM

## 2025-06-09 RX ORDER — PEN NEEDLE, DIABETIC 30 GX3/16"
1 NEEDLE, DISPOSABLE MISCELLANEOUS 4 TIMES DAILY
Qty: 100 EACH | Refills: 3 | Status: SHIPPED | OUTPATIENT
Start: 2025-06-09

## 2025-06-28 DIAGNOSIS — I10 HYPERTENSION, UNSPECIFIED TYPE: ICD-10-CM

## 2025-06-28 RX ORDER — METOPROLOL TARTRATE 25 MG/1
12.5 TABLET, FILM COATED ORAL 2 TIMES DAILY
Qty: 30 TABLET | Refills: 1 | Status: CANCELLED | OUTPATIENT
Start: 2025-06-28 | End: 2025-08-27

## 2025-06-30 DIAGNOSIS — I10 HYPERTENSION, UNSPECIFIED TYPE: ICD-10-CM

## 2025-06-30 RX ORDER — METOPROLOL TARTRATE 25 MG/1
12.5 TABLET, FILM COATED ORAL 2 TIMES DAILY
Qty: 30 TABLET | Refills: 1 | Status: SHIPPED | OUTPATIENT
Start: 2025-06-30 | End: 2025-08-29

## 2025-06-30 RX ORDER — INSULIN GLARGINE 100 [IU]/ML
41 INJECTION, SOLUTION SUBCUTANEOUS NIGHTLY
Qty: 30 ML | Refills: 1 | Status: SHIPPED | OUTPATIENT
Start: 2025-06-30 | End: 2025-12-27

## 2025-07-07 RX ORDER — INSULIN GLARGINE-YFGN 100 [IU]/ML
INJECTION, SOLUTION SUBCUTANEOUS DAILY
Status: CANCELLED | OUTPATIENT
Start: 2025-07-07

## 2025-07-30 ENCOUNTER — OFFICE VISIT (OUTPATIENT)
Facility: CLINIC | Age: 85
End: 2025-07-30
Payer: MEDICARE

## 2025-07-30 DIAGNOSIS — R23.8 VENOUS LAKE: ICD-10-CM

## 2025-07-30 DIAGNOSIS — D18.01 CHERRY ANGIOMA: ICD-10-CM

## 2025-07-30 DIAGNOSIS — L82.1 SK (SEBORRHEIC KERATOSIS): Primary | ICD-10-CM

## 2025-07-30 DIAGNOSIS — L57.0 AK (ACTINIC KERATOSIS): ICD-10-CM

## 2025-07-30 PROCEDURE — 1159F MED LIST DOCD IN RCRD: CPT | Mod: CPTII,S$GLB,, | Performed by: DERMATOLOGY

## 2025-07-30 PROCEDURE — 3288F FALL RISK ASSESSMENT DOCD: CPT | Mod: CPTII,S$GLB,, | Performed by: DERMATOLOGY

## 2025-07-30 PROCEDURE — 17000 DESTRUCT PREMALG LESION: CPT | Mod: S$GLB,,, | Performed by: DERMATOLOGY

## 2025-07-30 PROCEDURE — 99213 OFFICE O/P EST LOW 20 MIN: CPT | Mod: 25,S$GLB,, | Performed by: DERMATOLOGY

## 2025-07-30 PROCEDURE — 1101F PT FALLS ASSESS-DOCD LE1/YR: CPT | Mod: CPTII,S$GLB,, | Performed by: DERMATOLOGY

## 2025-07-30 PROCEDURE — 17003 DESTRUCT PREMALG LES 2-14: CPT | Mod: S$GLB,,, | Performed by: DERMATOLOGY

## 2025-07-30 PROCEDURE — 99999 PR PBB SHADOW E&M-EST. PATIENT-LVL III: CPT | Mod: PBBFAC,,, | Performed by: DERMATOLOGY

## 2025-07-30 NOTE — PROGRESS NOTES
Subjective:      Patient ID:  Cecilio Cespedes is a 85 y.o. male who presents for   Chief Complaint   Patient presents with    Spot     Erosive pustular dermatosis of scalp recheck     HPI    Established patient.  Here today for re-evaluation of scalp / face.     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       No personal or known family hx skin cancer.      Review of Systems   Constitutional:  Negative for fever, chills and fatigue.   Respiratory:  Negative for cough and shortness of breath.    Gastrointestinal:  Negative for nausea, vomiting and diarrhea.   Skin:  Positive for wears hat.   Hematologic/Lymphatic: Bruises/bleeds easily (asa 325).       Objective:   Physical Exam   Constitutional: He appears well-developed and well-nourished. He is cooperative.   HENT:   Head: Normocephalic and atraumatic.   Eyes: Lids are normal. Lids are normal.  Right conjunctiva is not injected. Left conjunctiva is not injected. No conjunctival no injection.   Pulmonary/Chest: Effort normal. No respiratory distress.   Neurological: He is alert and oriented to person, place, and time.   Psychiatric: He has a normal mood and affect. His speech is normal and behavior is normal. Mood, memory, affect and thought content normal.   Skin:   Areas Examined (abnormalities noted in diagram):   Scalp / Hair Palpated and Inspected  Head / Face Inspection Performed  Neck Inspection Performed            Diagram Legend     Erythematous scaling macule/papule c/w actinic keratosis       Vascular papule c/w angioma      Pigmented verrucoid papule/plaque c/w seborrheic keratosis      Yellow umbilicated papule c/w sebaceous hyperplasia      Irregularly shaped tan macule c/w lentigo     1-2 mm smooth white papules consistent with Milia      Movable subcutaneous cyst with punctum c/w epidermal inclusion cyst      Subcutaneous movable cyst c/w pilar cyst      Firm pink to brown papule c/w dermatofibroma      Pedunculated fleshy  papule(s) c/w skin tag(s)      Evenly pigmented macule c/w junctional nevus     Mildly variegated pigmented, slightly irregular-bordered macule c/w mildly atypical nevus      Flesh colored to evenly pigmented papule c/w intradermal nevus       Pink pearly papule/plaque c/w basal cell carcinoma      Erythematous hyperkeratotic cursted plaque c/w SCC      Surgical scar with no sign of skin cancer recurrence      Open and closed comedones      Inflammatory papules and pustules      Verrucoid papule consistent consistent with wart     Erythematous eczematous patches and plaques     Dystrophic onycholytic nail with subungual debris c/w onychomycosis     Umbilicated papule    Erythematous-base heme-crusted tan verrucoid plaque consistent with inflamed seborrheic keratosis     Erythematous Silvery Scaling Plaque c/w Psoriasis     See annotation      Assessment / Plan:      AK (actinic keratosis)  - Discussed diagnosis, etiology, and precancerous nature of condition.   - Cryosurgery Procedure Note: Discussed procedure with patient/patient's guardian including risks and benefits as well as treatment alternatives. Risks of procedure include pain, itching, swelling, redness, blistering, crusting, wound formation, post-inflammatory pigmentary alteration, scar, recurrence. Verbal consent obtained. LN2 cryosurgery performed to 3 lesion(s). Patient tolerated procedure well. After-visit wound care instructions reviewed and provided in writing.      SK (seborrheic keratosis)  Cherry angioma  Venous lake  - Benign; reassured treatment not necessary.             Follow up for annual skin checks.

## 2025-07-30 NOTE — PATIENT INSTRUCTIONS
CRYOSURGERY      Your doctor has used a method called cryosurgery to treat your skin condition. Cryosurgery refers to the use of very cold substances to treat a variety of skin conditions such as warts, pre-skin cancers, molluscum contagiosum, sun spots, and several benign growths. The substance we use in cryosurgery is liquid nitrogen and is so cold (-195 degrees Celsius) that is burns when administered.     Following treatment in the office, the skin may immediately burn and become red. You may find the area around the lesion is affected as well. It is sometimes necessary to treat not only the lesion, but a small area of the surrounding normal skin to achieve a good response.     A blister, and even a blood filled blister, may form after treatment.   This is a normal response. If the blister is painful, it is acceptable to sterilize a needle and with rubbing alcohol and gently pop the blister. It is important that you gently wash the area with soap and warm water as the blister fluid may contain wart virus if a wart was treated. Do no remove the roof of the blister.     The area treated can take anywhere from 1-3 weeks to heal. Healing time depends on the kind skin lesion treated, the location, and how aggressively the lesion was treated. It is recommended that the areas treated are covered with Vaseline or bacitracin ointment and a band-aid. If a band-aid is not practical, just ointment applied several times per day will do. Keeping these areas moist will speed the healing time.    Treatment with liquid nitrogen can leave a scar. In dark skin, it may be a light or dark scar, in light skin it may be a white or pink scar. These will generally fade with time, but may never go away completely.     If you have any concerns after your treatment, please feel free to call the office.       [address] [facility] [address]/ [phone] FAX/ [email or web address] What Are the Symptoms of Skin  Cancer?  A change in your skin is the most common sign of skin cancer. This could be a new growth, a sore that doesnt heal, or a change in a mole. Not all skin cancers look the same.    For melanoma specifically, a simple way to remember the warning signs is to remember the A-B-C-D-Es of melanoma--    A stands for asymmetrical. Does the mole or spot have an irregular shape with two parts that look very different?  B stands for border. Is the border irregular or jagged?  C is for color. Is the color uneven?  D is for diameter. Is the mole or spot larger than the size of a pea?  E is for evolving. Has the mole or spot changed during the past few weeks or months?    Talk to your doctor if you notice changes in your skin such as a new growth, a sore that doesnt heal, a change in an old growth, or any of the A-B-C-D-Es of melanoma    What Can I Do to Reduce My Risk of Skin Cancer?  Protection from ultraviolet (UV) radiation is important all year, not just during the summer or at the beach. UV rays from the sun can reach you on cloudy and hazy days, not just on bright and angel days. UV rays also reflect off of surfaces like water, cement, sand, and snow. Indoor tanning (using a tanning bed, estrada, or sunlamp to get tan) exposes users to UV radiation.    The hours between 10 a.m. and 4 p.m. Daylight Saving Time (9 a.m. to 3 p.m. standard time) are the most hazardous for UV exposure outdoors in the continental [place]. UV rays from sunlight are the greatest during the late spring and early summer in [place].    CDC recommends easy options for protection from UV radiation--    Stay in the shade or indoors, especially during midday hours.  Wear clothing that covers your arms and legs.  Wear a hat with a wide brim to shade your face, head, ears, and neck.  Wear sunglasses that wrap around and block both UVA and UVB rays.  Use sunscreen with a sun protection factor (SPF) of 30 or higher, and both UVA  and UVB (broad spectrum) protection.  Avoid indoor tanning.    Adapted from https://www.cdc.gov/cancer/skin/basic_info/

## 2025-08-22 ENCOUNTER — LAB VISIT (OUTPATIENT)
Dept: LAB | Facility: HOSPITAL | Age: 85
End: 2025-08-22
Attending: FAMILY MEDICINE
Payer: MEDICARE

## 2025-08-22 DIAGNOSIS — E11.42 TYPE 2 DIABETES MELLITUS WITH DIABETIC POLYNEUROPATHY, WITH LONG-TERM CURRENT USE OF INSULIN: ICD-10-CM

## 2025-08-22 DIAGNOSIS — Z79.4 TYPE 2 DIABETES MELLITUS WITH DIABETIC POLYNEUROPATHY, WITH LONG-TERM CURRENT USE OF INSULIN: ICD-10-CM

## 2025-08-22 LAB
ALBUMIN SERPL BCP-MCNC: 3.8 G/DL (ref 3.5–5.2)
ALP SERPL-CCNC: 49 UNIT/L (ref 40–150)
ALT SERPL W/O P-5'-P-CCNC: 21 UNIT/L (ref 0–55)
ANION GAP (OHS): 10 MMOL/L (ref 8–16)
AST SERPL-CCNC: 22 UNIT/L (ref 0–50)
BILIRUB SERPL-MCNC: 0.4 MG/DL (ref 0.1–1)
BUN SERPL-MCNC: 27 MG/DL (ref 8–23)
CALCIUM SERPL-MCNC: 9.1 MG/DL (ref 8.7–10.5)
CHLORIDE SERPL-SCNC: 107 MMOL/L (ref 95–110)
CO2 SERPL-SCNC: 22 MMOL/L (ref 23–29)
CREAT SERPL-MCNC: 1.9 MG/DL (ref 0.5–1.4)
EAG (OHS): 174 MG/DL (ref 68–131)
GFR SERPLBLD CREATININE-BSD FMLA CKD-EPI: 34 ML/MIN/1.73/M2
GLUCOSE SERPL-MCNC: 164 MG/DL (ref 70–110)
HBA1C MFR BLD: 7.7 % (ref 4–5.6)
POTASSIUM SERPL-SCNC: 4.4 MMOL/L (ref 3.5–5.1)
PROT SERPL-MCNC: 6.7 GM/DL (ref 6–8.4)
SODIUM SERPL-SCNC: 139 MMOL/L (ref 136–145)

## 2025-08-22 PROCEDURE — 36415 COLL VENOUS BLD VENIPUNCTURE: CPT | Mod: PN

## 2025-08-22 PROCEDURE — 83036 HEMOGLOBIN GLYCOSYLATED A1C: CPT

## 2025-08-22 PROCEDURE — 80053 COMPREHEN METABOLIC PANEL: CPT

## 2025-08-27 ENCOUNTER — OFFICE VISIT (OUTPATIENT)
Dept: PRIMARY CARE CLINIC | Facility: CLINIC | Age: 85
End: 2025-08-27
Payer: MEDICARE

## 2025-08-27 VITALS
OXYGEN SATURATION: 97 % | HEART RATE: 60 BPM | HEIGHT: 68 IN | DIASTOLIC BLOOD PRESSURE: 52 MMHG | WEIGHT: 143.63 LBS | TEMPERATURE: 98 F | SYSTOLIC BLOOD PRESSURE: 110 MMHG | BODY MASS INDEX: 21.77 KG/M2

## 2025-08-27 DIAGNOSIS — E11.22 CKD STAGE 3 DUE TO TYPE 2 DIABETES MELLITUS: ICD-10-CM

## 2025-08-27 DIAGNOSIS — E11.42 TYPE 2 DIABETES MELLITUS WITH DIABETIC POLYNEUROPATHY, WITH LONG-TERM CURRENT USE OF INSULIN: Primary | ICD-10-CM

## 2025-08-27 DIAGNOSIS — Z79.4 TYPE 2 DIABETES MELLITUS WITH DIABETIC POLYNEUROPATHY, WITH LONG-TERM CURRENT USE OF INSULIN: Primary | ICD-10-CM

## 2025-08-27 DIAGNOSIS — F33.1 MAJOR DEPRESSIVE DISORDER, RECURRENT, MODERATE: ICD-10-CM

## 2025-08-27 DIAGNOSIS — E78.2 MIXED HYPERLIPIDEMIA: Chronic | ICD-10-CM

## 2025-08-27 DIAGNOSIS — N18.30 CKD STAGE 3 DUE TO TYPE 2 DIABETES MELLITUS: ICD-10-CM

## 2025-08-27 DIAGNOSIS — I27.20 PULMONARY HYPERTENSION: ICD-10-CM

## 2025-08-27 PROCEDURE — 99999 PR PBB SHADOW E&M-EST. PATIENT-LVL IV: CPT | Mod: PBBFAC,,, | Performed by: FAMILY MEDICINE

## 2025-09-02 ENCOUNTER — LAB VISIT (OUTPATIENT)
Dept: LAB | Facility: HOSPITAL | Age: 85
End: 2025-09-02
Attending: FAMILY MEDICINE
Payer: MEDICARE

## 2025-09-02 DIAGNOSIS — E11.42 TYPE 2 DIABETES MELLITUS WITH DIABETIC POLYNEUROPATHY, WITH LONG-TERM CURRENT USE OF INSULIN: ICD-10-CM

## 2025-09-02 DIAGNOSIS — Z79.4 TYPE 2 DIABETES MELLITUS WITH DIABETIC POLYNEUROPATHY, WITH LONG-TERM CURRENT USE OF INSULIN: ICD-10-CM

## 2025-09-02 LAB
ALBUMIN/CREAT UR: 7 UG/MG
CREAT UR-MCNC: 86 MG/DL (ref 23–375)
MICROALBUMIN UR-MCNC: 6 UG/ML

## 2025-09-02 PROCEDURE — 82570 ASSAY OF URINE CREATININE: CPT
